# Patient Record
Sex: FEMALE | Race: WHITE | NOT HISPANIC OR LATINO | Employment: UNEMPLOYED | URBAN - METROPOLITAN AREA
[De-identification: names, ages, dates, MRNs, and addresses within clinical notes are randomized per-mention and may not be internally consistent; named-entity substitution may affect disease eponyms.]

---

## 2017-08-21 ENCOUNTER — ALLSCRIPTS OFFICE VISIT (OUTPATIENT)
Dept: OTHER | Facility: OTHER | Age: 47
End: 2017-08-21

## 2017-08-21 ENCOUNTER — TRANSCRIBE ORDERS (OUTPATIENT)
Dept: RADIOLOGY | Facility: CLINIC | Age: 47
End: 2017-08-21

## 2017-08-21 ENCOUNTER — APPOINTMENT (OUTPATIENT)
Dept: RADIOLOGY | Facility: CLINIC | Age: 47
End: 2017-08-21
Payer: COMMERCIAL

## 2017-08-21 DIAGNOSIS — M70.62 TROCHANTERIC BURSITIS OF LEFT HIP: ICD-10-CM

## 2017-08-21 DIAGNOSIS — Z12.39 ENCOUNTER FOR OTHER SCREENING FOR MALIGNANT NEOPLASM OF BREAST: ICD-10-CM

## 2017-08-21 DIAGNOSIS — M25.552 PAIN IN LEFT HIP: ICD-10-CM

## 2017-08-21 DIAGNOSIS — N90.7 VULVAR CYST: ICD-10-CM

## 2017-08-21 DIAGNOSIS — M16.12 PRIMARY OSTEOARTHRITIS OF LEFT HIP: ICD-10-CM

## 2017-08-21 PROCEDURE — 72110 X-RAY EXAM L-2 SPINE 4/>VWS: CPT

## 2017-08-21 PROCEDURE — 73502 X-RAY EXAM HIP UNI 2-3 VIEWS: CPT

## 2017-08-22 ENCOUNTER — GENERIC CONVERSION - ENCOUNTER (OUTPATIENT)
Dept: OTHER | Facility: OTHER | Age: 47
End: 2017-08-22

## 2017-08-29 ENCOUNTER — ALLSCRIPTS OFFICE VISIT (OUTPATIENT)
Dept: OTHER | Facility: OTHER | Age: 47
End: 2017-08-29

## 2017-09-05 ENCOUNTER — HOSPITAL ENCOUNTER (OUTPATIENT)
Dept: RADIOLOGY | Facility: HOSPITAL | Age: 47
Discharge: HOME | End: 2017-09-05
Attending: ORTHOPAEDIC SURGERY
Payer: COMMERCIAL

## 2017-09-05 DIAGNOSIS — M70.62: ICD-10-CM

## 2017-09-05 PROCEDURE — 77002 NEEDLE LOCALIZATION BY XRAY: CPT

## 2017-09-05 PROCEDURE — 20610 DRAIN/INJ JOINT/BURSA W/O US: CPT

## 2017-09-05 RX ORDER — LIDOCAINE HYDROCHLORIDE 10 MG/ML
10 INJECTION, SOLUTION INFILTRATION; PERINEURAL
Status: COMPLETED | OUTPATIENT
Start: 2017-09-05 | End: 2017-09-05

## 2017-09-05 RX ORDER — BUPIVACAINE HYDROCHLORIDE 2.5 MG/ML
2 INJECTION, SOLUTION EPIDURAL; INFILTRATION; INTRACAUDAL
Status: COMPLETED | OUTPATIENT
Start: 2017-09-05 | End: 2017-09-05

## 2017-09-05 RX ORDER — METHYLPREDNISOLONE ACETATE 80 MG/ML
80 INJECTION, SUSPENSION INTRA-ARTICULAR; INTRALESIONAL; INTRAMUSCULAR; SOFT TISSUE
Status: COMPLETED | OUTPATIENT
Start: 2017-09-05 | End: 2017-09-05

## 2017-09-05 RX ADMIN — LIDOCAINE HYDROCHLORIDE 10 ML: 10 INJECTION, SOLUTION INFILTRATION; PERINEURAL at 14:01

## 2017-09-05 RX ADMIN — BUPIVACAINE HYDROCHLORIDE 2 ML: 2.5 INJECTION, SOLUTION EPIDURAL; INFILTRATION; INTRACAUDAL; PERINEURAL at 14:02

## 2017-09-05 RX ADMIN — METHYLPREDNISOLONE ACETATE 80 MG: 80 INJECTION, SUSPENSION INTRA-ARTICULAR; INTRALESIONAL; INTRAMUSCULAR; SOFT TISSUE at 14:11

## 2017-09-05 RX ADMIN — IOHEXOL 3 ML: 300 INJECTION, SOLUTION INTRAVENOUS at 14:02

## 2017-09-12 ENCOUNTER — HOSPITAL ENCOUNTER (OUTPATIENT)
Dept: RADIOLOGY | Facility: CLINIC | Age: 47
Discharge: HOME | End: 2017-09-12
Payer: COMMERCIAL

## 2017-09-12 ENCOUNTER — ALLSCRIPTS OFFICE VISIT (OUTPATIENT)
Dept: OTHER | Facility: OTHER | Age: 47
End: 2017-09-12

## 2017-09-12 ENCOUNTER — GENERIC CONVERSION - ENCOUNTER (OUTPATIENT)
Dept: OTHER | Facility: OTHER | Age: 47
End: 2017-09-12

## 2017-09-12 ENCOUNTER — TRANSCRIBE ORDERS (OUTPATIENT)
Dept: RADIOLOGY | Facility: CLINIC | Age: 47
End: 2017-09-12

## 2017-09-12 DIAGNOSIS — Z12.39 ENCOUNTER FOR OTHER SCREENING FOR MALIGNANT NEOPLASM OF BREAST: ICD-10-CM

## 2017-09-12 PROCEDURE — G0202 SCR MAMMO BI INCL CAD: HCPCS

## 2017-09-15 LAB
ADEQUACY: (HISTORICAL): NORMAL
CLINICIAN PROVIDIED ICD 9 OR 10 (HISTORICAL): NORMAL
COMMENT (HISTORICAL): NORMAL
DIAGNOSIS (HISTORICAL): NORMAL
NOTE: (HISTORICAL): NORMAL
PERFORMED BY (HISTORICAL): NORMAL
REFLEX (HISTORICAL): NORMAL

## 2017-09-19 ENCOUNTER — GENERIC CONVERSION - ENCOUNTER (OUTPATIENT)
Dept: OTHER | Facility: OTHER | Age: 47
End: 2017-09-19

## 2017-09-26 DIAGNOSIS — N90.7 VULVAR CYST: ICD-10-CM

## 2017-09-28 ENCOUNTER — GENERIC CONVERSION - ENCOUNTER (OUTPATIENT)
Dept: OTHER | Facility: OTHER | Age: 47
End: 2017-09-28

## 2017-09-28 ENCOUNTER — HOSPITAL ENCOUNTER (OUTPATIENT)
Dept: RADIOLOGY | Facility: HOSPITAL | Age: 47
Discharge: HOME | End: 2017-09-28
Attending: FAMILY MEDICINE
Payer: COMMERCIAL

## 2017-09-28 DIAGNOSIS — N90.7 VULVAR CYST: ICD-10-CM

## 2017-09-28 PROCEDURE — 76705 ECHO EXAM OF ABDOMEN: CPT

## 2017-10-27 ENCOUNTER — ALLSCRIPTS OFFICE VISIT (OUTPATIENT)
Dept: OTHER | Facility: OTHER | Age: 47
End: 2017-10-27

## 2017-10-27 ENCOUNTER — TRANSCRIBE ORDERS (OUTPATIENT)
Dept: ADMINISTRATIVE | Facility: HOSPITAL | Age: 47
End: 2017-10-27

## 2017-10-27 DIAGNOSIS — S61.218A LACERATION WITHOUT FOREIGN BODY OF OTHER FINGER WITHOUT DAMAGE TO NAIL, INITIAL ENCOUNTER: ICD-10-CM

## 2017-10-27 DIAGNOSIS — M70.62 TROCHANTERIC BURSITIS OF LEFT HIP: ICD-10-CM

## 2017-10-27 DIAGNOSIS — M54.16 LUMBAR RADICULOPATHY: Primary | ICD-10-CM

## 2017-10-27 DIAGNOSIS — L03.019 CELLULITIS OF FINGER: ICD-10-CM

## 2017-10-27 DIAGNOSIS — M54.16 RADICULOPATHY OF LUMBAR REGION: ICD-10-CM

## 2017-10-27 DIAGNOSIS — M25.552 PAIN IN LEFT HIP: ICD-10-CM

## 2017-10-28 NOTE — PROGRESS NOTES
Assessment  1  Lumbar radiculopathy (724 4) (M54 16)    Plan  Greater trochanteric bursitis of left hip    · Diclofenac Sodium 1 % Transdermal Gel (Voltaren); APPLY SPARINGLY TO  AFFECTED AREA(S) THREE TO FOUR TIMES A DAY  Greater trochanteric bursitis of left hip, Left hip pain    · *1 - [facility] [facility]  *  Status: Active -  Retrospective Authorization  Requested for: [id number]  Care Summary provided  : Yes  Lumbar radiculopathy    · PredniSONE 20 MG Oral Tablet; TAKE 1 TABLET 3 TIMES DAILY WITH MEALS   · * MRI LUMBAR SPINE WO CONTRAST; Status:Hold For - Scheduling,Retrospective By  Protocol Authorization; Requested PBO:[id number];     Ivonne Steinberg is here for follow-up regarding her left lower extremity pain complaints  On her previous visit with me and felt that most of her pain complaints were due to her greater trochanteric bursitis and possibly a small component from her osteoarthritis  On review of her hip x-rays again her amount of arthritis in her left hip is underwhelming  Her pain today is localized over the lateral hip however her historical pain complaints refer more to of lumbar type of radiculopathy down her left lower extremity and her neuro exam was positive for this today  She does have a history of back problems in the past and her x-rays from August were again reviewed which did not reveal any signs of gross sign of stenosis or advanced degenerative change however I am concerned for stenosis in the foramina based off of her history and her clinical exam today  She does not have any pathologic reflexes or focal changes in her neuro exam  I will get an MRI of her lumbar spine without contrast to evaluate for foraminal or central stenosis  I have also given her a prescription for short course of prednisone to help relieve her radicular type complaints down her left lower extremity   For her left hip I have also given her a topical anti-inflammatories to be applied over the greater trochanteric bursa region  I have discouraged her to return to physical therapy for her hip because after which pending the results of the MRI we can add add more intensive physical therapy for her lumbar spine  I would like to see her back in approximately 2 weeks or after her MRI is completed for three view here with me so that we can further delineate her plan of care  The patient may call the office at anytime if any questions or concerns should arise  Chief Complaint  1  Hip Pain  Left hip pain follow-up      History of Present Illness  HPI: Silvina Hurley is here for continued follow-up regarding her complex left lower extremity pain complaints  The last time I saw her I felt that her symptoms were mostly due to her bursitis in her osteoarthritis  She was given my locking am it ordered undergo a steroid injection of her left hip and start physical therapy  She states the steroid injection only provided a few hours of relief and physical therapy was not sterilely pursued due to a death in the family  Her pain complaints change slightly today in the sense that she reports a left lower extremity diffuse burning type of sharp pain that goes from the back of the hip down to the foot  She states that her toes can even get numb on occasion  She states the pain is a burning type nature  She also states there is pain in the groin lateral hip posterior hip as well  The pain is still related with activity but she states she also has pain down her left lower extremity when she lays down in bed at night trying to sleep  Review of Systems    Constitutional: No fever, no chills, feels well, no tiredness, no recent weight gain or loss  Eyes: No complaints of eyesight problems, no red eyes  ENT: no loss of hearing, no nosebleeds, no sore throat  Cardiovascular: No complaints of chest pain, no palpitations, no leg claudication or lower extremity edema     Respiratory: no compliants of shortness of breath, no wheezing, no cough  Gastrointestinal: no complaints of abdominal pain, no constipation, no nausea or diarrhea, no vomiting, no bloody stools  Genitourinary: no complaints of dysuria, no incontinence  Musculoskeletal: arthralgias,-- limb pain-- and-- joint stiffness, but-- no joint swelling,-- no myalgias-- and-- no limb swelling  Integumentary: no complaints of skin rash or lesion, no itching or dry skin, no skin wounds  Neurological: no complaints of headache, no confusion, no numbness or tingling, no dizziness  Endocrine: excessive thirst, but-- no muscle weakness-- and-- no frequent urination  no feelings of weakness   Psychiatric: No suicidal thoughts, no anxiety, no feelings of depression  ROS reviewed  Active Problems  1  Adult BMI 28 0-28 9 kg/sq m (V85 24) (Z68 28)   2  Encounter for other screening for malignant neoplasm of breast (V76 19) (Z12 39)   3  Encounter for screening examination for impaired glucose regulation and diabetes   mellitus (V77 1) (Z13 1)   4  Greater trochanteric bursitis of left hip (726 5) (M70 62)   5  Left hip pain (719 45) (M25 552)   6  Low back pain (724 2) (M54 5)   7  Need for influenza vaccination (V04 81) (Z23)   8  Primary osteoarthritis of left hip (715 15) (M16 12)   9  Screening cholesterol level (V77 91) (Z13 220)   10  Vulvar cyst (624 8) (N90 7)   11  Well woman exam with routine gynecological exam (V72 31) (Z01 419)    Past Medical History   · History of Hip pain (719 45) (M25 559)    The active problems and past medical history were reviewed and updated today  Surgical History   · History of Ankle Arthroplasty   · History of Breast Surgery Enlargement Procedure Bilateral   · History of Breast Surgery Lumpectomy   · History of Laparoscopic Excision Of Ectopic Pregnancy And Salpingectomy    The surgical history was reviewed and updated today         Family History  Mother    · Family history of arthritis (V17 7) (Z82 61)   · Family history of emphysema (V17 6) (Z82 5)  Maternal Aunt    · Family history of malignant neoplasm of breast (V16 3) (Z80 3)    The family history was reviewed and updated today  Social History   · Daily caffeinated coffee consumption   · Former smoker (Z33 93) (D87 155)   · Social drinker (V49 89) (Z78 9)  The social history was reviewed and updated today  Current Meds   1  No Reported Medications Recorded    The medication list was reviewed and updated today  Allergies  1  Vicodin TABS    Vitals   Recorded: [id number] 08:17AM   Height 5 ft 2 in   Weight 163 lb    BMI Calculated 29 81   BSA Calculated 1 75     Physical Exam      Gen  : Awake alert and oriented Ã3, no acute distress, BMI of 29 81  Left hip: Skin diffusely intact, range of motion is to 90Â° of flexion, 45Â° of abduction, 20Â° of adduction, 40Â° of external rotation, 10Â° of internal rotation, positive Stinchfield, tenderness to palpation greater trochanter, positive neuro straight leg raise left side     Constitutional - General appearance: Normal    Musculoskeletal - Gait and station: Abnormal  Gait evaluation demonstrated antalgia on the left  -- Muscle strength/tone: Normal -- Lower extremity compartments: Normal    Cardiovascular - Pulses: Normal -- Examination of extremities for edema and/or varicosities: Normal    Skin - Skin and subcutaneous tissue: Normal    Neurologic - Sensation: Normal -- Lower extremity peripheral neuro exam: Normal    Psychiatric - Orientation to person, place, and time: Normal -- Mood and affect: Normal    Eyes   Conjunctiva and lids: Normal     Pupils and irises: Normal        Future Appointments    Date/Time Provider Specialty Site   11/07/2017 08:30 AM Lee Downs DO Orthopedic Surgery [facility] ORTHO [address] [facility] [address]   12/29/2017 08:15 AM Lee Downs DO Orthopedic Surgery [facility]     Signatures   Electronically signed by :  Tona Fuller DO; Oct 27 2017  9:16AM EST (Author)

## 2017-11-08 ENCOUNTER — GENERIC CONVERSION - ENCOUNTER (OUTPATIENT)
Dept: OTHER | Facility: OTHER | Age: 47
End: 2017-11-08

## 2017-11-08 ENCOUNTER — TRANSCRIBE ORDERS (OUTPATIENT)
Dept: RADIOLOGY | Facility: CLINIC | Age: 47
End: 2017-11-08

## 2017-11-08 ENCOUNTER — APPOINTMENT (OUTPATIENT)
Dept: RADIOLOGY | Facility: CLINIC | Age: 47
End: 2017-11-08
Payer: COMMERCIAL

## 2017-11-08 DIAGNOSIS — L03.019 CELLULITIS OF FINGER: ICD-10-CM

## 2017-11-08 DIAGNOSIS — S61.218A LACERATION WITHOUT FOREIGN BODY OF OTHER FINGER WITHOUT DAMAGE TO NAIL, INITIAL ENCOUNTER: ICD-10-CM

## 2017-11-08 PROCEDURE — 73130 X-RAY EXAM OF HAND: CPT

## 2017-11-10 ENCOUNTER — GENERIC CONVERSION - ENCOUNTER (OUTPATIENT)
Dept: OTHER | Facility: OTHER | Age: 47
End: 2017-11-10

## 2017-11-14 ENCOUNTER — APPOINTMENT (OUTPATIENT)
Dept: PHYSICAL THERAPY | Facility: CLINIC | Age: 47
End: 2017-11-14
Payer: COMMERCIAL

## 2017-11-14 DIAGNOSIS — M54.16 RADICULOPATHY OF LUMBAR REGION: ICD-10-CM

## 2017-11-14 PROCEDURE — G8979 MOBILITY GOAL STATUS: HCPCS

## 2017-11-14 PROCEDURE — G8978 MOBILITY CURRENT STATUS: HCPCS

## 2017-11-14 PROCEDURE — 97161 PT EVAL LOW COMPLEX 20 MIN: CPT

## 2017-11-16 ENCOUNTER — GENERIC CONVERSION - ENCOUNTER (OUTPATIENT)
Dept: OTHER | Facility: OTHER | Age: 47
End: 2017-11-16

## 2017-11-16 ENCOUNTER — APPOINTMENT (OUTPATIENT)
Dept: PHYSICAL THERAPY | Facility: CLINIC | Age: 47
End: 2017-11-16
Payer: COMMERCIAL

## 2017-11-16 PROCEDURE — 97112 NEUROMUSCULAR REEDUCATION: CPT

## 2017-11-16 PROCEDURE — 97110 THERAPEUTIC EXERCISES: CPT

## 2017-11-21 ENCOUNTER — APPOINTMENT (OUTPATIENT)
Dept: PHYSICAL THERAPY | Facility: CLINIC | Age: 47
End: 2017-11-21
Payer: COMMERCIAL

## 2017-11-24 ENCOUNTER — APPOINTMENT (OUTPATIENT)
Dept: PHYSICAL THERAPY | Facility: CLINIC | Age: 47
End: 2017-11-24
Payer: COMMERCIAL

## 2017-11-28 ENCOUNTER — APPOINTMENT (OUTPATIENT)
Dept: PHYSICAL THERAPY | Facility: CLINIC | Age: 47
End: 2017-11-28
Payer: COMMERCIAL

## 2017-12-29 ENCOUNTER — TRANSCRIBE ORDERS (OUTPATIENT)
Dept: ADMINISTRATIVE | Facility: HOSPITAL | Age: 47
End: 2017-12-29

## 2017-12-29 ENCOUNTER — ALLSCRIPTS OFFICE VISIT (OUTPATIENT)
Dept: OTHER | Facility: OTHER | Age: 47
End: 2017-12-29

## 2017-12-29 DIAGNOSIS — M25.552 PAIN IN LEFT HIP: ICD-10-CM

## 2017-12-29 DIAGNOSIS — M25.552 LEFT HIP PAIN: Primary | ICD-10-CM

## 2017-12-29 DIAGNOSIS — M54.16 RADICULOPATHY OF LUMBAR REGION: ICD-10-CM

## 2017-12-30 NOTE — PROGRESS NOTES
Assessment   1  Left hip pain (429 45) (M25 552)   2  Lumbar radiculopathy (724 4) (M54 16)    Plan   Left hip pain    · * MRI HIP LEFT WO CONTRAST; Status:Active - Retrospective By Protocol Authorization; Requested for:[id number];     Leif Mccracken is here for continued follow-up regarding her left lower extremity issues  She is here quite frustrated uncomfortable regarding her left hip complaints  She has attempted anti-inflammatories, Medrol Dosepak in steroid treatment, physical therapy, all of which have failed  She has attempted multiple sessions of physical therapy and this actually made her left hip and leg complaints much worse and she was unable to attend the remaining sessions  She is here today frustrated that her pain is unrelenting and in etiology for her pain is not established yet  Her examination today localizes her discomfort to the hip as hip examination is provocative and causes her pain however she does have confounding findings of radicular type symptoms as she does have a history of lumbar spine issues  At this point the 2 possible etiologies for her discomfort meaning the left hip and lumbar spine need further advanced imaging to evaluate for underlying pathology that may be causing her pain  Her x-rays of the left hip are underwhelming and joint spaces are well maintained therefore I do not feel that this is an arthritic process and an MRI of the left hip without contrast is indicated  She has activity-related pain localized around the hip, failed conservative measures of treatment, attempted physical therapy and has failed secondary to pain and further advanced imaging is warranted  She does have underlying lumbar radicular complaints however they do not seem to be the more painful of the 2 presently  We will get the MRI of her left hip without contrast and I will see her back in the office for its review here with me to further delineate her plan of care   The patient may call the office at anytime if any questions or concerns should arise  The patient has the current Goals: Decreased left hip and leg pain  Patient is able to Self-Care  The treatment plan was reviewed with the patient/guardian  The patient/guardian understands and agrees with the treatment plan      Chief Complaint   1  Hip Pain  Left hip follow-up      History of Present Illness   HPI: Renetta Couch is here for continued follow-up regarding her left hip complaints  She is here visibly upset and very uncomfortable regarding her left hip  She states that since her last visit with me she has gone to 2 sessions of physical therapy and after which have significantly increased her left hip pain  She has also tried anti-inflammatories as well as a steroid prescription that I have given her both of which failed to give her long-lasting relief of her pain  Today she complains that her left hip has a band like, vice  type of pain that goes from the front to the lateral to the posterior portion of her hip  She states there is pain that does radiate down the lateral and anterior portions of her thigh  It starts in the groin, the pain radiates down the anterior thigh and lateral thigh and is made worse with movement, walking, activity, and even attempting to lay down and get sleep at night  She is extremely uncomfortable and very frustrated that the source of her pain has not been discovered  Review of Systems        Constitutional: feeling poorly,-- recent weight gain-- and-- feeling tired, but-- no fever,-- no chills-- and-- no recent weight loss  Eyes: No complaints of eyesight problems, no red eyes  ENT: no loss of hearing, no nosebleeds, no sore throat  Cardiovascular: No complaints of chest pain, no palpitations, no leg claudication or lower extremity edema  Respiratory: no compliants of shortness of breath, no wheezing, no cough        Gastrointestinal: no complaints of abdominal pain, no constipation, no nausea or diarrhea, no vomiting, no bloody stools  Genitourinary: no complaints of dysuria, no incontinence  Musculoskeletal: arthralgias,-- joint swelling,-- limb pain,-- myalgias,-- joint stiffness-- and-- limb swelling  Integumentary: no complaints of skin rash or lesion, no itching or dry skin, no skin wounds  Neurological: numbness-- and-- tingling, but-- no headache,-- no confusion-- and-- no dizziness  Endocrine: muscle weakness, but-- no frequent urination-- and-- no excessive thirst  no feelings of weakness      Psychiatric: No suicidal thoughts, no anxiety, no feelings of depression  ROS reviewed  Active Problems   1  Adult BMI 28 0-28 9 kg/sq m (V85 24) (Z68 28)   2  Cellulitis of little finger (681 00) (L03 019)   3  Encounter for other screening for malignant neoplasm of breast (V76 19) (Z12 39)   4  Encounter for screening examination for impaired glucose regulation and diabetes     mellitus (V77 1) (Z13 1)   5  Greater trochanteric bursitis of left hip (726 5) (M70 62)   6  Laceration of finger, little (883 0) (S61 218A)   7  Left hip pain (719 45) (M25 552)   8  Low back pain (724 2) (M54 5)   9  Lumbar radiculopathy (724 4) (M54 16)   10  Need for influenza vaccination (V04 81) (Z23)   11  Primary osteoarthritis of left hip (715 15) (M16 12)   12  Screening cholesterol level (V77 91) (Z13 220)   13  Vulvar cyst (624 8) (N90 7)   14  Well woman exam with routine gynecological exam (V72 31) (Z01 419)    Past Medical History    · History of Hip pain (719 45) (M25 559)     The active problems and past medical history were reviewed and updated today  Surgical History    · History of Ankle Arthroplasty   · History of Breast Surgery Enlargement Procedure Bilateral   · History of Breast Surgery Lumpectomy   · History of Laparoscopic Excision Of Ectopic Pregnancy And Salpingectomy     The surgical history was reviewed and updated today         Family History   Mother    · Family history of arthritis (V17 7) (Z82 61)   · Family history of emphysema (V17 6) (Z82 5)  Maternal Aunt    · Family history of malignant neoplasm of breast (V16 3) (Z80 3)     The family history was reviewed and updated today  Social History    · Daily caffeinated coffee consumption   · Former smoker (Q93 89) (A57 433)   · Social drinker (V49 89) (Z78 9)  The social history was reviewed and updated today  Current Meds    1  Tylenol 325 MG Oral Tablet Recorded     The medication list was reviewed and updated today  Allergies   1  Vicodin TABS    Vitals    Recorded: [id number] 08:33AM   Heart Rate 738   Systolic 640   Diastolic 88   Height 5 ft 2 in   Weight 164 lb 4 00 oz   BMI Calculated 30 04   BSA Calculated 1 76     Physical Exam     Gen  : Awake alert and oriented Ã3, no acute distress, BMI of 30 04  Left hip: Skin diffusely intact, no dysesthesia with light touch, no skin mottling or change in color, no rash, range of motion is to 90Â° of flexion, 45Â° of abduction, 20Â° of adduction, 40Â° of external rotation, 10Â° of internal rotation, positive Stinchfield, minimal tenderness to palpation greater trochanter, positive neuro straight leg raise left side, all motion of the left hip demonstrates discomfort circumferentially around the hip without radiating pain down past the knee  Positive SI joint pain with compression on the left side, + STEVEN/FADIR         Constitutional - General appearance: Normal       Musculoskeletal - Gait and station: Abnormal  Gait evaluation demonstrated antalgia on the left-- and-- Slow antalgic gait  -- Muscle strength/tone: Normal -- Lower extremity compartments: Normal       Cardiovascular - Pulses: Normal -- Examination of extremities for edema and/or varicosities: Normal       Skin - Skin and subcutaneous tissue: Normal       Neurologic - Sensation: Normal       Psychiatric - Orientation to person, place, and time: Normal -- Mood and affect: Normal       Eyes      Conjunctiva and lids: Normal        Pupils and irises: Normal        Signatures    Electronically signed by :  Yvette Levin DO; Dec 29 2017  9:27AM EST                       (Author)

## 2018-01-02 ENCOUNTER — GENERIC CONVERSION - ENCOUNTER (OUTPATIENT)
Dept: OTHER | Facility: OTHER | Age: 48
End: 2018-01-02

## 2018-01-02 ENCOUNTER — HOSPITAL ENCOUNTER (OUTPATIENT)
Dept: RADIOLOGY | Facility: HOSPITAL | Age: 48
Discharge: HOME | End: 2018-01-02
Attending: ORTHOPAEDIC SURGERY
Payer: COMMERCIAL

## 2018-01-02 DIAGNOSIS — M25.552 LEFT HIP PAIN: ICD-10-CM

## 2018-01-02 PROCEDURE — 73721 MRI JNT OF LWR EXTRE W/O DYE: CPT

## 2018-01-03 ENCOUNTER — TRANSCRIBE ORDERS (OUTPATIENT)
Dept: ADMINISTRATIVE | Facility: HOSPITAL | Age: 48
End: 2018-01-03

## 2018-01-03 ENCOUNTER — ALLSCRIPTS OFFICE VISIT (OUTPATIENT)
Dept: OTHER | Facility: OTHER | Age: 48
End: 2018-01-03

## 2018-01-03 DIAGNOSIS — M16.12 PRIMARY OSTEOARTHRITIS OF LEFT HIP: ICD-10-CM

## 2018-01-03 DIAGNOSIS — M54.16 LUMBAR RADICULOPATHY: Primary | ICD-10-CM

## 2018-01-08 ENCOUNTER — HOSPITAL ENCOUNTER (OUTPATIENT)
Dept: RADIOLOGY | Facility: HOSPITAL | Age: 48
Discharge: HOME | End: 2018-01-08
Attending: ORTHOPAEDIC SURGERY
Payer: COMMERCIAL

## 2018-01-08 DIAGNOSIS — M54.16 RADICULOPATHY OF LUMBAR REGION: ICD-10-CM

## 2018-01-08 PROCEDURE — 72148 MRI LUMBAR SPINE W/O DYE: CPT

## 2018-01-09 ENCOUNTER — GENERIC CONVERSION - ENCOUNTER (OUTPATIENT)
Dept: OTHER | Facility: OTHER | Age: 48
End: 2018-01-09

## 2018-01-10 NOTE — RESULT NOTES
Verified Results  * XR HAND 3+ VIEW LEFT [id number] 09:29AM Enrigue Tracy Order Number: [id number]     Test Name Result Flag Reference   XR HAND 3+ VW LEFT (Report)     LEFT HAND     INDICATION: Cellulitis of the 5th digit  COMPARISON: None     VIEWS: 3     IMAGES: 3     For the purposes of institution wide universal language the following terms will apply: (thumb=1st digit/finger, index finger=2nd digit/finger, long finger=3rd digit/finger, ring=4th digit/finger and small finger=5th digit/finger)     FINDINGS:     There is no acute fracture or dislocation  No degenerative changes  No lytic or blastic lesions are seen  Soft tissue swelling of the proximal 5th digit noted with no retained radiopaque foreign body or subcutaneous emphysema  IMPRESSION:     No acute osseous abnormality  Soft tissue swelling proximal 5th digit without evidence for osteomyelitis or retained radiopaque foreign body         Workstation performed: [id number]     Signed by:   Yessica Quintero MD   11/9/17

## 2018-01-12 VITALS
RESPIRATION RATE: 16 BRPM | SYSTOLIC BLOOD PRESSURE: 120 MMHG | DIASTOLIC BLOOD PRESSURE: 80 MMHG | HEART RATE: 72 BPM | BODY MASS INDEX: 29.37 KG/M2 | HEIGHT: 62 IN | WEIGHT: 159.6 LBS | TEMPERATURE: 97.2 F

## 2018-01-13 VITALS — HEIGHT: 62 IN | WEIGHT: 163 LBS | BODY MASS INDEX: 30 KG/M2

## 2018-01-14 VITALS
HEIGHT: 62 IN | SYSTOLIC BLOOD PRESSURE: 124 MMHG | HEART RATE: 70 BPM | BODY MASS INDEX: 29.81 KG/M2 | DIASTOLIC BLOOD PRESSURE: 82 MMHG | WEIGHT: 162 LBS

## 2018-01-14 NOTE — PROGRESS NOTES
Assessment    1  Well woman exam with routine gynecological exam (V72 31) (Z01 419)   2  Adult BMI 28 0-28 9 kg/sq m (V85 24) (Z68 28)    Plan  Encounter for screening examination for impaired glucose regulation and diabetes  mellitus, Screening cholesterol level    · (1) COMPREHENSIVE METABOLIC PANEL; Status:Active; Requested for:12Sep2017;    · (1) LIPID PANEL FASTING W DIRECT LDL REFLEX; Status:Active; Requested  for:12Sep2017;   Vulvar cyst    · US SUPERFICIAL LUMP (NON EXTREMITY); Status:Hold For - Scheduling; Requested  for:12Sep2017; Well woman exam with routine gynecological exam    · (1) THIN PREP PAP WITH IMAGING; Status:Active; Requested for:12Sep2017; Maturation index required? : No  : 15 years ago  HPV? : if ASCUS   · Follow-up visit in 1 year Evaluation and Treatment  Follow-up  Status: Hold For -  Scheduling  Requested for: 12Sep2017   · Begin a limited exercise program ; Status:Complete;   Done: 25FLP5790   · Decreasing the stress in your life may help your condition improve ; Status:Complete;    Done: 90OQF1960   · Regular aerobic exercise can help reduce stress ; Status:Complete;   Done: 84RPB8252   · Use a sun block product with an SPF of 15 or more ; Status:Complete;   Done:  65PJQ7699   · Vitamins can help you get daily requirements that your diet may not be giving you ;  Status:Complete;   Done: 47LXO9926   · Call [phone] if: You find a new or different kind of lump in your breast ;  Status:Complete;   Done: 38ASD6020   · Call [phone] if: You have any warning signs of skin cancer ; Status:Complete;    Done: 57OQD9583    Discussion/Summary  health maintenance visit Currently, she eats an adequate diet and has an inadequate exercise regimen  Pap test with reflex HPV testing was done today Breast cancer screening: mammogram has been ordered  Colorectal cancer screening: colorectal cancer screening is not indicated  Screening lab work includes glucose and lipid profile   The risks and benefits of immunizations were discussed and immunizations will be given as outlined in the orders  Advice and education were given regarding nutrition and aerobic exercise  Doing well  pap completed  mammogram is scheduled  will give u/s to eval sq mass on pubic symphysis area    bw script given  flu shot given  The patient was counseled regarding instructions for management, risk factor reductions, impressions, importance of compliance with treatment  Possible side effects of new medications were reviewed with the patient/guardian today  The treatment plan was reviewed with the patient/guardian  The patient/guardian understands and agrees with the treatment plan      Chief Complaint  patient presenting for her annual physical and PAP   Patient has order for mammo and believes she has an laurie today sl/cma      History of Present Illness  HM, Adult Female: The patient is being seen for a health maintenance and gynecology evaluation  Social History: Household members include boyfriend  General Health: The patient's health since the last visit is described as good  Immunizations status:  tdap utd -- ok for flu  Lifestyle:  She consumes a diverse and healthy diet  She has weight concerns  She does not use tobacco  She consumes alcohol  She reports occasional alcohol use  She denies drug use  hasn't been exercising recently due to hip but going to PT  Reproductive health:  pregnancy history: G 2P 1(ectopic pregnancies: 1 )   lmp= 39-41 years old-- postmenopausal    Screening: Additional History:  had steroid injection in hip    has lump on vagina she wants looked at   no pain  no discharge  pt has had it for " while"    has mammogram scheduled  Review of Systems    Constitutional: not feeling poorly and not feeling tired  Eyes: no eyesight problems  ENT: no hearing loss     Cardiovascular: No complaints of slow heart rate, no fast heart rate, no chest pain, no palpitations, no leg claudication, no lower extremity edema  Respiratory: No complaints of shortness of breath, no wheezing, no cough, no SOB on exertion, no orthopnea, no PND  Gastrointestinal: no change in bowel or bladder habits  Genitourinary: as noted in HPI  Musculoskeletal: arthralgias  Integumentary: as noted in HPI  Hematologic/Lymphatic: no swollen glands  Active Problems    1  Encounter for other screening for malignant neoplasm of breast (V76 19) (Z12 39)   2  Greater trochanteric bursitis of left hip (726 5) (M70 62)   3  Left hip pain (719 45) (M25 552)   4  Low back pain (724 2) (M54 5)   5  Primary osteoarthritis of left hip (715 15) (M16 12)    Past Medical History    · History of Hip pain (719 45) (M25 559)    Surgical History    · History of Ankle Arthroplasty   · History of Breast Surgery Enlargement Procedure Bilateral   · History of Breast Surgery Lumpectomy   · History of Laparoscopic Excision Of Ectopic Pregnancy And Salpingectomy    Family History  Mother    · Family history of arthritis (V17 7) (Z82 61)   · Family history of emphysema (V17 6) (Z82 5)  Maternal Aunt    · Family history of malignant neoplasm of breast (V16 3) (Z80 3)    Social History    · Daily caffeinated coffee consumption   · Former smoker (D50 76) (H24 442)   · Social drinker (V49 89) (Z78 9)    Current Meds   1  Meloxicam 15 MG Oral Tablet; take 1 tablet daily prn; Therapy: 33Scr7576 to (Evaluate:20Oct2017)  Requested for: 84Qhz3373; Last   Rx:75Iws6066 Ordered    Allergies    1  Vicodin TABS    Vitals   Recorded: 12Sep2017 08:55AM   Temperature 97 4 F   Heart Rate 72   Respiration 16   Systolic 536   Diastolic 80   Height 5 ft 2 in   Weight 158 lb 6 4 oz   BMI Calculated 28 97   BSA Calculated 1 73     Physical Exam    Constitutional   General appearance: No acute distress, well appearing and well nourished  Head and Face   Head and face: Normal     Eyes   Conjunctiva and lids: No swelling, erythema or discharge  Ears, Nose, Mouth, and Throat   External inspection of ears and nose: Normal     Neck   Neck: Supple, symmetric, trachea midline, no masses  Thyroid: Normal, no thyromegaly  Pulmonary   Respiratory effort: No increased work of breathing or signs of respiratory distress  Auscultation of lungs: Clear to auscultation  Cardiovascular   Auscultation of heart: Normal rate and rhythm, normal S1 and S2, no murmurs  Examination of extremities for edema and/or varicosities: Normal     Chest   Breasts: Normal, no dimpling or skin changes appreciated  + implants  Chest: Normal     Abdomen   Abdomen: Non-tender, no masses  Genitourinary + mobile sq mass on pubic symphysis area  Urethra: Normal, no discharge  Cervix: Normal, no lesions, Pap obtained  Uterus: Normal size, no tenderness, no masses  Adnexa/Parametria: Normal, no masses or tenderness  Lymphatic   Palpation of lymph nodes in neck: No lymphadenopathy  Palpation of lymph nodes in groin: No lymphadenopathy  Musculoskeletal   Gait and station: Normal     Neurologic   Cranial nerves: Cranial nerves II-XII intact      Psychiatric   Judgment and insight: Normal     Orientation to person, place, and time: Normal     Mood and affect: Normal        Future Appointments    Date/Time Provider Specialty Site   12/29/2017 08:15 AM Heather Mancera DO Orthopedic Surgery [facility]     Signatures   Electronically signed by : Ciera Serrano DO; Sep 12 2017  9:29AM EST                       (Author)

## 2018-01-17 NOTE — RESULT NOTES
Verified Results  * XR HIP/PELV 2-3 VWS LEFT W PELVIS IF PERFORMED 21Aug2017 02:44PM Morna Pucker Order Number: [id number]     Test Name Result Flag Reference   * XR HIP/PELV 2-3 VWS LEFT (Report)     LEFT HIP     INDICATION: Low back pain into left hip  COMPARISON: None     VIEWS: AP pelvis and 2 coned down views     IMAGES: 3     FINDINGS:     There is no fracture or dislocation  Moderate narrowing of the left hip joint  There is a scoliosis of the lumbar spine convexity to the left  No lytic or blastic lesions are seen  Soft tissues are unremarkable  IMPRESSION:     Moderate narrowing of the left hip joint  Scoliosis  Workstation performed: [id number]     Signed by:   Nazia Samuel MD   8/22/17     * XR SPINE LUMBAR MINIMUM 4 VIEWS NON INJURY 21Aug2017 02:44PM Morna Pucker Order Number: [id number]     Test Name Result Flag Reference   XR SPINE LUMBAR MINIMUM 4 VIEWS (Report)     LUMBAR SPINE     INDICATION: Low back pain radiates to left hip  COMPARISON: None     VIEWS: AP, lateral, bilateral oblique and coned down projections     IMAGES: 5     FINDINGS:     A mild scoliosis is present convexity to the left  There is no radiographic evidence of acute fracture or destructive osseous lesion  Minimal spurring at several levels  Visualized soft tissues appear unremarkable  IMPRESSION:     Mild scoliosis         Workstation performed: [id number]     Signed by:   Nazia Samuel MD   8/22/17

## 2018-01-17 NOTE — RESULT NOTES
Discussion/Summary   Your mammogram is normal   Please repeat mammogram in 1 year  Dr Marna Meckel CAD [id number] 09:43AM Eric Sadler Order Number: [id number]    - Patient Instructions: To schedule this appointment, please contact Central Scheduling at [phone]  Do not wear any perfume, powder, lotion or deodorant on breast or underarm area  Please bring your doctors order, referral (if needed) and insurance information with you on the day of the test  Failure to bring this information may result in this test being rescheduled  Arrive 15 minutes prior to your appointment time to register  On the day of your test, please bring any prior mammogram or breast studies with you that were not performed at a [facility]  Failure to bring prior exams may result in your test needing to be rescheduled  Test Name Result Flag Reference   MAMMO SCREENING BILATERAL W CAD (Report)     Patient History:   Patient is postmenopausal    Family history of breast cancer in mother  Retro-pectoral saline implants in both breasts, April 17, 2009  No Hormone Replacement Therapy   Patient is a former smoker, and smoked for 8 years  Patient's    BMI is 28 9  Reason for exam: screening, asymptomatic  Mammo Screening Bilateral W CAD: September 12, 2017 - Check In #:   [de-identified]   Bilateral MLO, CC, XCCL, CCID, and MLOID view(s) were taken  Technologist: CHUCK Dickerson   No prior studies available for comparison  There are scattered fibroglandular densities  No new dominant    soft tissue mass, architectural distortion or suspicious    calcifications are noted  The skin and nipple structures are    within normal limits  Benign appearing calcifications are noted  Bilateral retropectoral implants  No mammographic evidence of malignancy  No    significant changes when compared with prior studies       ACR BI-RADSï¾® Assessments: BiRad:2 - Benign     Recommendation:   Routine screening mammogram of both breasts in 1 year  Analyzed by CAD     The patient is scheduled in a reminder system for screening    mammography  8-10% of cancers will be missed on mammography  Management of a    palpable abnormality must be based on clinical grounds  Patients   will be notified of their results via letter from our facility  Accredited by [employer] of [employer] and FDA       Transcription Location: [place] [address]: [id number]     Risk Value(s):   Tyrer-Cuzick 10 Year: 3 400%, Tyrer-Cuzick Lifetime: 16 900%,    Myriad Table: 1 5%, MARK 5 Year: 1 4%, NCI Lifetime: 15 7%   Signed by:   Haleigh Rogel MD   9/12/17

## 2018-01-18 NOTE — RESULT NOTES
Discussion/Summary   please call patient  mass consistent with lipoma--- benign  rl      Verified Results  US SUPERFICIAL LUMP (NON EXTREMITY) [id number] 09:36AM Rita Arnold Order Number: [id number]    - Patient Instructions: To schedule this appointment, please contact Central Scheduling at [phone]  Test Name Result Flag Reference   US TRUNK SOFT TISSUE (Report)     ULTRASOUND left labial mass  TECHNIQUE:  Using a high frequency transducer, the labial region was scanned   INDICATION: Mass in the left labial region  COMPARISON: None     FINDINGS:     Soft tissue mass is seen in the left labial region measuring 3 1 x 1 2 x 2 0 cm  This is slightly heterogeneous  The sonographer reports that this is compressible and mobile  This likely represents a lipoma  This is asymmetric compared to the right    side  IMPRESSION:     Soft tissue mass in the left labial region  This likely represents a lipoma         Workstation performed: [id number]     Signed by:   Tawny Hancock MD   9/28/17

## 2018-01-22 VITALS
HEIGHT: 62 IN | SYSTOLIC BLOOD PRESSURE: 130 MMHG | BODY MASS INDEX: 30.18 KG/M2 | DIASTOLIC BLOOD PRESSURE: 78 MMHG | RESPIRATION RATE: 16 BRPM | TEMPERATURE: 97.2 F | HEART RATE: 72 BPM | WEIGHT: 164 LBS

## 2018-01-22 VITALS
DIASTOLIC BLOOD PRESSURE: 80 MMHG | TEMPERATURE: 97.4 F | HEART RATE: 72 BPM | HEIGHT: 62 IN | SYSTOLIC BLOOD PRESSURE: 130 MMHG | RESPIRATION RATE: 16 BRPM | WEIGHT: 158.4 LBS | BODY MASS INDEX: 29.15 KG/M2

## 2018-01-23 VITALS
WEIGHT: 164.25 LBS | HEIGHT: 62 IN | HEART RATE: 89 BPM | BODY MASS INDEX: 30.22 KG/M2 | DIASTOLIC BLOOD PRESSURE: 80 MMHG | SYSTOLIC BLOOD PRESSURE: 133 MMHG

## 2018-01-23 VITALS
WEIGHT: 164.25 LBS | BODY MASS INDEX: 30.22 KG/M2 | HEIGHT: 62 IN | SYSTOLIC BLOOD PRESSURE: 137 MMHG | DIASTOLIC BLOOD PRESSURE: 88 MMHG | HEART RATE: 101 BPM

## 2018-01-24 VITALS
WEIGHT: 164.25 LBS | SYSTOLIC BLOOD PRESSURE: 119 MMHG | DIASTOLIC BLOOD PRESSURE: 86 MMHG | HEIGHT: 62 IN | HEART RATE: 97 BPM | BODY MASS INDEX: 30.22 KG/M2

## 2018-02-02 ENCOUNTER — TELEPHONE (OUTPATIENT)
Dept: OBGYN CLINIC | Facility: HOSPITAL | Age: 48
End: 2018-02-02

## 2018-02-02 ENCOUNTER — TELEPHONE (OUTPATIENT)
Dept: PAIN MEDICINE | Facility: CLINIC | Age: 48
End: 2018-02-02

## 2018-02-02 ENCOUNTER — TELEPHONE (OUTPATIENT)
Dept: OBGYN CLINIC | Facility: CLINIC | Age: 48
End: 2018-02-02

## 2018-02-02 DIAGNOSIS — M25.559 ARTHRALGIA OF HIP, UNSPECIFIED LATERALITY: Primary | ICD-10-CM

## 2018-02-02 NOTE — TELEPHONE ENCOUNTER
Patient is calling  Patient sees Dr Maryjane Carter    Patient is calling asking to speak directly with Dr Maryjane Carter  She is stating it's about the same problem but getting worse  Patient is stating that she has done some research & also spoke to her brother who is a doctor & they thing that it is "trochanteric bursitis"  Patient is stating that she is absolutely miserable & pain is horrific

## 2018-02-02 NOTE — TELEPHONE ENCOUNTER
Patient would like to see pain management I will put order in and send this off the Bre[facility] to schedule her an appointment

## 2018-02-02 NOTE — TELEPHONE ENCOUNTER
Spoke with patient let her know that her option at this point are a bursa injection or pain management she wasn't too happy with either of those choices as she is scared of "her skin cells dying from the injection" She is asking if an anti inflammatory medication can be sent over to her pharmacy  She sated she was on these before and they upset her stomach and I told her all of these medications do he same thing  The  got on the phone and was questioning why we would not give her an anti inflammatory and I stated because it upset her stomach the first time and he stated well she has no other options, she doesn't want the injection so she will deal with the upset stomach and if the medication doesn't work then she will have to go with the bursa injection

## 2018-02-02 NOTE — TELEPHONE ENCOUNTER
It is evident she cannot tolerate NSAID based off of history and I will not prescribe her other anti-inflammatories due to the risk of gastric ulceration and bleeding  I do not have any other options for treatment at this point other than a hip bursa injection  If she does not want to pursue this course of treatment, then there is no need for an appointment with me    She should see pain management in that case

## 2018-02-02 NOTE — TELEPHONE ENCOUNTER
Patient was referred by Dr Jonas Welch, I called patient to start Intake  I spoke with  at first very rude and used inappropriate language  I ask to speak to wife he gave her the phone and was able to complete intake at [facility] office but once I gave Trey Dominguez her appointment and time we had available patient got very upset and stated she did not want to schedule a Consult because what she needs is medication  Patient started using inappropriate language and stated we were wasting her time and hanged up

## 2018-02-28 ENCOUNTER — OFFICE VISIT (OUTPATIENT)
Dept: FAMILY MEDICINE CLINIC | Facility: CLINIC | Age: 48
End: 2018-02-28
Payer: COMMERCIAL

## 2018-02-28 VITALS
SYSTOLIC BLOOD PRESSURE: 130 MMHG | HEIGHT: 63 IN | HEART RATE: 88 BPM | TEMPERATURE: 99.3 F | BODY MASS INDEX: 29.77 KG/M2 | WEIGHT: 168 LBS | DIASTOLIC BLOOD PRESSURE: 80 MMHG | RESPIRATION RATE: 16 BRPM

## 2018-02-28 DIAGNOSIS — S05.02XA CORNEA ABRASION, LEFT, INITIAL ENCOUNTER: Primary | ICD-10-CM

## 2018-02-28 PROCEDURE — 99214 OFFICE O/P EST MOD 30 MIN: CPT | Performed by: FAMILY MEDICINE

## 2018-02-28 RX ORDER — CIPROFLOXACIN HYDROCHLORIDE 3.5 MG/ML
2 SOLUTION/ DROPS TOPICAL CONTINUOUS
Qty: 20 ML | Refills: 0 | Status: SHIPPED | OUTPATIENT
Start: 2018-02-28 | End: 2018-08-19

## 2018-02-28 NOTE — PROGRESS NOTES
Chief Complaint   Patient presents with    Eye Problem        Patient ID: Siri Delgado is a 56 y o  female  HPI    Pt is seeing for L eye FB sensation and redness x 1 day -  Was cleaning attic and felt "something went to L eye " -  Has been rubbing L eye since this am  - no vision changes     The following portions of the patient's history were reviewed and updated as appropriate: allergies, current medications, past family history, past medical history, past social history, past surgical history and problem list     Review of Systems   HENT: Negative  Eyes: Positive for redness  Negative for pain, discharge and visual disturbance  Respiratory: Negative  Skin: Negative  Allergic/Immunologic: Negative  Current Outpatient Prescriptions   Medication Sig Dispense Refill    ciprofloxacin (CILOXAN) 0 3 % ophthalmic solution Administer 2 drops into the left eye continuous Every 30 min for 6 h, then every 1 h for 1 day then every 4 y for 7 days 20 mL 0     No current facility-administered medications for this visit  Objective:    /80 (BP Location: Left arm, Patient Position: Sitting, Cuff Size: Standard)   Pulse 88   Temp 99 3 °F (37 4 °C) (Tympanic)   Resp 16   Ht 5' 3" (1 6 m)   Wt 76 2 kg (168 lb)   BMI 29 76 kg/m²        Physical Exam   Constitutional: She appears well-developed  No distress  HENT:   Head: Normocephalic  Eyes: Pupils are equal, round, and reactive to light  Lids are everted and swept, no foreign bodies found  Left eye exhibits no discharge and no exudate  No foreign body present in the left eye  Left conjunctiva is injected  Left conjunctiva has no hemorrhage  Left eye exhibits normal extraocular motion      possible cornea abrasion       Assessment/Plan:       Diagnoses and all orders for this visit:    Cornea abrasion, left, initial encounter  -     ciprofloxacin (CILOXAN) 0 3 % ophthalmic solution; Administer 2 drops into the left eye continuous Every 30 min for 6 h, then every 1 h for 1 day then every 4 y for 7 days          rto prn                   Katie Bonilla MD

## 2018-03-03 ENCOUNTER — HOSPITAL ENCOUNTER (EMERGENCY)
Facility: HOSPITAL | Age: 48
Discharge: HOME | End: 2018-03-03
Attending: EMERGENCY MEDICINE | Admitting: EMERGENCY MEDICINE
Payer: COMMERCIAL

## 2018-03-03 VITALS
DIASTOLIC BLOOD PRESSURE: 86 MMHG | RESPIRATION RATE: 18 BRPM | HEIGHT: 63 IN | TEMPERATURE: 99 F | SYSTOLIC BLOOD PRESSURE: 124 MMHG | OXYGEN SATURATION: 96 % | BODY MASS INDEX: 28.7 KG/M2 | WEIGHT: 162 LBS | HEART RATE: 88 BPM

## 2018-03-03 DIAGNOSIS — S05.00XA CORNEAL ABRASION: Primary | ICD-10-CM

## 2018-03-03 PROCEDURE — 99283 EMERGENCY DEPT VISIT LOW MDM: CPT

## 2018-03-03 PROCEDURE — 96372 THER/PROPH/DIAG INJ SC/IM: CPT

## 2018-03-03 RX ORDER — TETRACAINE HYDROCHLORIDE 5 MG/ML
2 SOLUTION OPHTHALMIC
COMMUNITY
End: 2018-08-19

## 2018-03-03 RX ORDER — TETRACAINE HYDROCHLORIDE 5 MG/ML
1 SOLUTION OPHTHALMIC ONCE
Status: COMPLETED | OUTPATIENT
Start: 2018-03-03 | End: 2018-03-03

## 2018-03-03 RX ORDER — KETOROLAC TROMETHAMINE 5 MG/ML
1 SOLUTION OPHTHALMIC 4 TIMES DAILY PRN
Qty: 5 ML | Refills: 0 | Status: SHIPPED | OUTPATIENT
Start: 2018-03-03 | End: 2018-08-19

## 2018-03-03 RX ORDER — KETOROLAC TROMETHAMINE 30 MG/ML
60 INJECTION, SOLUTION INTRAMUSCULAR; INTRAVENOUS ONCE
Status: COMPLETED | OUTPATIENT
Start: 2018-03-03 | End: 2018-03-03

## 2018-03-03 RX ADMIN — KETOROLAC TROMETHAMINE 60 MG: 30 INJECTION, SOLUTION INTRAMUSCULAR at 10:59

## 2018-03-03 RX ADMIN — TETRACAINE HYDROCHLORIDE 1 DROP: 5 SOLUTION OPHTHALMIC at 10:59

## 2018-03-03 RX ADMIN — FLUORESCEIN SODIUM 1 STRIP: 0.6 STRIP OPHTHALMIC at 10:59

## 2018-03-03 NOTE — DISCHARGE INSTRUCTIONS
Corneal Abrasion   WHAT YOU NEED TO KNOW:   A corneal abrasion is a scratch on the cornea of your eye  The cornea is the clear layer that covers the front of your eye  A small scratch may heal in 1 to 2 days  Deeper or larger scratches may take longer to heal         DISCHARGE INSTRUCTIONS:   Contact your healthcare provider if:   · Your eye pain or vision gets worse  · You have yellow or green drainage from your eye  · You have questions or concerns about your condition or care  Medicines:   · Medicines  may be given in the form of eyedrops or ointment to help prevent an eye infection  You may also be given eye drops to decrease pain  Ask how to take this medicine safely  · Take your medicine as directed  Contact your healthcare provider if you think your medicine is not helping or if you have side effects  Tell him or her if you are allergic to any medicine  Keep a list of the medicines, vitamins, and herbs you take  Include the amounts, and when and why you take them  Bring the list or the pill bottles to follow-up visits  Carry your medicine list with you in case of an emergency  Follow up with your healthcare provider as directed:  Write down your questions so you remember to ask them during your visits  Self-care:   · Do not touch or rub your eye  · Ask your healthcare provider when you can start your normal activities  · Ask your healthcare provider when you can wear your contact lenses  · Wear sunglasses in bright light until your eyes feel better  Help prevent corneal abrasions:   · Remove your contact lenses if your eyes feel dry or irritated  · Wash your hands if you need to touch your eyes or your face  · Trim your child's fingernails so he cannot scratch his eye  · Wear protective eyewear when you work with chemicals, wood, dust, or metal      · Wear protective eyewear when you play sports  · Do not wear your contacts for longer than you should       · Do not wear colored lenses or lenses with shapes on them  These lenses may cause eye damage and vision loss  · Do not wear glitter makeup  Glitter can easily get into your eyes and under contact lenses  · Do not sleep with your contacts  © 2017 [employer] Information is for End User's use only and may not be sold, redistributed or otherwise used for commercial purposes  All illustrations and images included in CareNotes® are the copyrighted property of [employer] , [employer]  or [employer]  The above information is an  only  It is not intended as medical advice for individual conditions or treatments  Talk to your doctor, nurse or pharmacist before following any medical regimen to see if it is safe and effective for you

## 2018-03-03 NOTE — ED PROVIDER NOTES
History  Chief Complaint   Patient presents with    Eye Injury     patient states she feels like she has something in her left eye, since Thursday  Saw PMD and started on Cipro and Tetracaine and states it is not improving  also c/o chronic left hip pain  Patient presents for evaluation of left eye pain  States she was getting something from storage on Thursday and something fell in her eye  Saw PMD and given Cipro drops and tetracaine drops, not improving  History provided by:  Patient   used: No        Prior to Admission Medications   Prescriptions Last Dose Informant Patient Reported? Taking?   ciprofloxacin (CILOXAN) 0 3 % ophthalmic solution 3/3/2018 at Unknown time  No Yes   Sig: Administer 2 drops into the left eye continuous Every 30 min for 6 h, then every 1 h for 1 day then every 4 y for 7 days   tetracaine 0 5 % ophthalmic solution 3/3/2018 at Unknown time  Yes Yes   Sig: Administer 2 drops into the left eye      Facility-Administered Medications: None       Past Medical History:   Diagnosis Date    Chronic hip pain, left        History reviewed  No pertinent surgical history  History reviewed  No pertinent family history  I have reviewed and agree with the history as documented  Social History   Substance Use Topics    Smoking status: Never Smoker    Smokeless tobacco: Never Used    Alcohol use No        Review of Systems   Eyes: Positive for pain and redness  All other systems reviewed and are negative        Physical Exam  ED Triage Vitals [03/03/18 0947]   Temperature Pulse Respirations Blood Pressure SpO2   99 °F (37 2 °C) 88 18 124/86 96 %      Temp Source Heart Rate Source Patient Position - Orthostatic VS BP Location FiO2 (%)   Tympanic Monitor Sitting -- --      Pain Score       Worst Possible Pain           Orthostatic Vital Signs  Vitals:    03/03/18 0947   BP: 124/86   Pulse: 88   Patient Position - Orthostatic VS: Sitting       Physical Exam Constitutional: She is oriented to person, place, and time  No distress  Eyes: EOM and lids are normal  Pupils are equal, round, and reactive to light  Lids are everted and swept, no foreign bodies found  Right conjunctiva is not injected  Left conjunctiva is injected  Slit lamp exam:       The left eye shows corneal abrasion and fluorescein uptake  The left eye shows no foreign body  Cardiovascular: Normal rate, regular rhythm and intact distal pulses  Pulmonary/Chest: Effort normal and breath sounds normal  No respiratory distress  Neurological: She is alert and oriented to person, place, and time  Skin: Capillary refill takes less than 2 seconds  She is not diaphoretic  Nursing note and vitals reviewed  ED Medications  Medications   tetracaine 0 5 % ophthalmic solution 1 drop (1 drop Left Eye Given 3/3/18 1059)   fluorescein sodium sterile ophthalmic strip 1 strip (1 strip Left Eye Given 3/3/18 1059)   ketorolac (TORADOL) injection 60 mg (60 mg Intramuscular Given 3/3/18 1059)       Diagnostic Studies  Results Reviewed     None                 No orders to display              Procedures  Procedures       Phone Contacts  ED Phone Contact    ED Course  ED Course                                MDM  Number of Diagnoses or Management Options  Corneal abrasion:   Diagnosis management comments: Pulse ox 96% on RA indicating adequate oxygenation    Patient instructed to stop using the tetracaine  Continue with abx drops and will at [facility] for pain  Needs to follow up with Ophthalmology          Amount and/or Complexity of Data Reviewed  Decide to obtain previous medical records or to obtain history from someone other than the patient: yes  Review and summarize past medical records: yes    Patient Progress  Patient progress: stable    CritCare Time    Disposition  Final diagnoses:   Corneal abrasion     Time reflects when diagnosis was documented in both MDM as applicable and the Disposition within this note     Time User Action Codes Description Comment    3/3/2018 11:35 AM Shorty Iraheta Denging Add [S05 00XA] Corneal abrasion       ED Disposition     ED Disposition Condition Comment    Discharge  Desiree Rothman discharge to home/self care  Condition at discharge: stable        Follow-up Information     Follow up With Specialties Details Why Azael Carvajal MD Ophthalmology In 1 day  [address]  [phone]          Discharge Medication List as of 3/3/2018 11:37 AM      CONTINUE these medications which have NOT CHANGED    Details   ciprofloxacin (CILOXAN) 0 3 % ophthalmic solution Administer 2 drops into the left eye continuous Every 30 min for 6 h, then every 1 h for 1 day then every 4 y for 7 days, Starting Wed 2/28/2018, Normal      tetracaine 0 5 % ophthalmic solution Administer 2 drops into the left eye, Historical Med           No discharge procedures on file      ED Provider  Electronically Signed by           Sandra Haile DO  03/03/18 8328

## 2018-03-06 ENCOUNTER — VBI (OUTPATIENT)
Dept: ADMINISTRATIVE | Facility: OTHER | Age: 48
End: 2018-03-06

## 2018-08-17 LAB — HBA1C MFR BLD HPLC: 5.4 %

## 2018-08-19 ENCOUNTER — HOSPITAL ENCOUNTER (EMERGENCY)
Facility: HOSPITAL | Age: 48
Discharge: HOME | End: 2018-08-19
Attending: EMERGENCY MEDICINE | Admitting: EMERGENCY MEDICINE
Payer: COMMERCIAL

## 2018-08-19 VITALS
WEIGHT: 171 LBS | SYSTOLIC BLOOD PRESSURE: 150 MMHG | BODY MASS INDEX: 30.29 KG/M2 | DIASTOLIC BLOOD PRESSURE: 82 MMHG | RESPIRATION RATE: 18 BRPM | HEART RATE: 89 BPM | TEMPERATURE: 99 F | OXYGEN SATURATION: 100 %

## 2018-08-19 DIAGNOSIS — S05.02XA CORNEAL ABRASION, LEFT: Primary | ICD-10-CM

## 2018-08-19 PROCEDURE — 99283 EMERGENCY DEPT VISIT LOW MDM: CPT

## 2018-08-19 RX ORDER — KETOROLAC TROMETHAMINE 5 MG/ML
1 SOLUTION OPHTHALMIC 4 TIMES DAILY
Status: DISCONTINUED | OUTPATIENT
Start: 2018-08-19 | End: 2018-08-19 | Stop reason: HOSPADM

## 2018-08-19 RX ORDER — KETOROLAC TROMETHAMINE 5 MG/ML
1 SOLUTION OPHTHALMIC EVERY 6 HOURS
Qty: 5 ML | Refills: 0 | Status: SHIPPED | OUTPATIENT
Start: 2018-08-19 | End: 2019-11-08

## 2018-08-19 RX ORDER — CIPROFLOXACIN HYDROCHLORIDE 3.5 MG/ML
1 SOLUTION/ DROPS TOPICAL
Qty: 5 ML | Refills: 0 | Status: SHIPPED | OUTPATIENT
Start: 2018-08-19 | End: 2019-11-08

## 2018-08-19 RX ORDER — CIPROFLOXACIN HYDROCHLORIDE 3.5 MG/ML
1 SOLUTION/ DROPS TOPICAL ONCE
Status: COMPLETED | OUTPATIENT
Start: 2018-08-19 | End: 2018-08-19

## 2018-08-19 RX ADMIN — CIPROFLOXACIN HYDROCHLORIDE 1 DROP: 3 SOLUTION/ DROPS OPHTHALMIC at 16:58

## 2018-08-19 RX ADMIN — KETOROLAC TROMETHAMINE 1 DROP: 5 SOLUTION OPHTHALMIC at 17:00

## 2018-08-19 NOTE — ED NOTES
Discharge instructions reviewed with patient  Patient states understanding  Patient discharged to home       Jose Carlos Bass RN  08/19/18 4582

## 2018-08-19 NOTE — ED PROVIDER NOTES
History  Chief Complaint   Patient presents with    Eye Pain     was in a garage yesterday and left eye has been swollen, painful and red since yesterday     Patient states she was cleaning the garage yesterday and was sweeping as well as blowing dirt off the floor  Patient felt a foreign body sensation in the left eye, associated with redness and tearing of the eye  Patient tried flushing the eye out several times at home and rubbed until she felt improved for awhile  Patient developed continued pain and tearing later associated with photophobia  She presents today for evaluation  Patient has had similar corneal abrasions in the past x2  None       Past Medical History:   Diagnosis Date    Chronic hip pain, left        Past Surgical History:   Procedure Laterality Date    ANKLE ARTHROPLASTY      BREAST LUMPECTOMY      BREAST SURGERY Bilateral     Enlargement    LAPAROSCOPY FOR ECTOPIC PREGNANCY      And Salpingectomy       Family History   Problem Relation Age of Onset    Arthritis Mother     Emphysema Mother     Breast cancer Maternal Aunt      I have reviewed and agree with the history as documented  Social History   Substance Use Topics    Smoking status: Never Smoker    Smokeless tobacco: Never Used      Comment: Former smoker, per allscripts    Alcohol use No      Comment: social drinker, per allscripts        Review of Systems   Constitutional: Negative for fever  Eyes: Positive for photophobia, pain, redness and visual disturbance  Negative for itching  Respiratory: Negative for cough  Cardiovascular: Negative for chest pain  Gastrointestinal: Negative for abdominal pain  All other systems reviewed and are negative  Physical Exam  Physical Exam   Constitutional: She appears well-developed and well-nourished  HENT:   Head: Normocephalic  Mouth/Throat: Oropharynx is clear and moist    Eyes:   The left eye is red injected    Even without floor seen corneal abrasion is evident with the naked eye  No foreign bodies present   Neck: Normal range of motion  Neck supple  Cardiovascular: Normal rate, regular rhythm and normal heart sounds  Pulmonary/Chest: Effort normal    Abdominal: Soft  There is no tenderness  Musculoskeletal: Normal range of motion  Skin: Skin is warm and dry  Psychiatric: She has a normal mood and affect  Her behavior is normal    Nursing note and vitals reviewed  Vital Signs  ED Triage Vitals [08/19/18 1623]   Temperature Pulse Respirations Blood Pressure SpO2   99 °F (37 2 °C) 89 18 150/82 100 %      Temp src Heart Rate Source Patient Position - Orthostatic VS BP Location FiO2 (%)   -- -- -- -- --      Pain Score       Worst Possible Pain           Vitals:    08/19/18 1623   BP: 150/82   Pulse: 89       Visual Acuity      ED Medications  Medications   ketorolac (ACULAR) 0 5 % ophthalmic solution 1 drop (1 drop Left Eye Given 8/19/18 1700)   ciprofloxacin (CILOXAN) 0 3 % ophthalmic solution 1 drop (1 drop Left Eye Given 8/19/18 1658)       Diagnostic Studies  Results Reviewed     None                 No orders to display              Procedures  Procedures       Phone Contacts  ED Phone Contact    ED Course                               MDM  Number of Diagnoses or Management Options  Corneal abrasion, left:   Diagnosis management comments: The eye was examined under fluorescein staining which is remarkable for a corneal abrasion directly in the central part of cornea overlying the pupil  The eye was flushed out with normal saline solution under pressure  The lid was everted and irrigated  Ciloxan and Acular were placed in the eye    CritCare Time    Disposition  Final diagnoses:   Corneal abrasion, left     Time reflects when diagnosis was documented in both MDM as applicable and the Disposition within this note     Time User Action Codes Description Comment    8/19/2018  4:51 PM Maxine Escamilla Add [S05  02XA] Corneal abrasion, left       ED Disposition     ED Disposition Condition Comment    Discharge  Darcella Matters discharge to home/self care  Condition at discharge: Stable        Follow-up Information     Follow up With Specialties Details Why [address], [address] [address]  [phone]      opthomologist  Schedule an appointment as soon as possible for a visit in 1 day  in your plan          Patient's Medications   Discharge Prescriptions    CIPROFLOXACIN (CILOXAN) 0 3 % OPHTHALMIC SOLUTION    Administer 1 drop into the left eye every 2 (two) hours       Start Date: 8/19/2018 End Date: --       Order Dose: 1 drop       Quantity: 5 mL    Refills: 0    KETOROLAC (ACULAR) 0 5 % OPHTHALMIC SOLUTION    Administer 1 drop into the left eye every 6 (six) hours       Start Date: 8/19/2018 End Date: --       Order Dose: 1 drop       Quantity: 5 mL    Refills: 0     No discharge procedures on file      ED Provider  Electronically Signed by           Merna Saeed MD  08/19/18 3130

## 2018-08-19 NOTE — DISCHARGE INSTRUCTIONS
Corneal Abrasion   WHAT YOU NEED TO KNOW:   A corneal abrasion is a scratch on the cornea of your eye  The cornea is the clear layer that covers the front of your eye  A small scratch may heal in 1 to 2 days  Deeper or larger scratches may take longer to heal         DISCHARGE INSTRUCTIONS:   Contact your healthcare provider if:   · Your eye pain or vision gets worse  · You have yellow or green drainage from your eye  · You have questions or concerns about your condition or care  Medicines:   · Medicines  may be given in the form of eyedrops or ointment to help prevent an eye infection  You may also be given eye drops to decrease pain  Ask how to take this medicine safely  · Take your medicine as directed  Contact your healthcare provider if you think your medicine is not helping or if you have side effects  Tell him or her if you are allergic to any medicine  Keep a list of the medicines, vitamins, and herbs you take  Include the amounts, and when and why you take them  Bring the list or the pill bottles to follow-up visits  Carry your medicine list with you in case of an emergency  Follow up with your healthcare provider as directed:  Write down your questions so you remember to ask them during your visits  Self-care:   · Do not touch or rub your eye  · Ask your healthcare provider when you can start your normal activities  · Ask your healthcare provider when you can wear your contact lenses  · Wear sunglasses in bright light until your eyes feel better  Help prevent corneal abrasions:   · Remove your contact lenses if your eyes feel dry or irritated  · Wash your hands if you need to touch your eyes or your face  · Trim your child's fingernails so he cannot scratch his eye  · Wear protective eyewear when you work with chemicals, wood, dust, or metal      · Wear protective eyewear when you play sports  · Do not wear your contacts for longer than you should       · Do not wear colored lenses or lenses with shapes on them  These lenses may cause eye damage and vision loss  · Do not wear glitter makeup  Glitter can easily get into your eyes and under contact lenses  · Do not sleep with your contacts in your eyes  © 2017 [address] [employer] Information is for End User's use only and may not be sold, redistributed or otherwise used for commercial purposes  All illustrations and images included in CareNotes® are the copyrighted property of A D A "[employer] , [employer]  or [employer] Estevez  The above information is an  only  It is not intended as medical advice for individual conditions or treatments  Talk to your doctor, nurse or pharmacist before following any medical regimen to see if it is safe and effective for you

## 2018-08-20 ENCOUNTER — TELEPHONE (OUTPATIENT)
Dept: ADMINISTRATIVE | Facility: OTHER | Age: 48
End: 2018-08-20

## 2018-08-23 ENCOUNTER — OFFICE VISIT (OUTPATIENT)
Dept: FAMILY MEDICINE CLINIC | Facility: CLINIC | Age: 48
End: 2018-08-23
Payer: COMMERCIAL

## 2018-08-23 VITALS
HEIGHT: 63 IN | BODY MASS INDEX: 30.3 KG/M2 | DIASTOLIC BLOOD PRESSURE: 80 MMHG | WEIGHT: 171 LBS | SYSTOLIC BLOOD PRESSURE: 122 MMHG | RESPIRATION RATE: 12 BRPM | TEMPERATURE: 97.8 F | HEART RATE: 72 BPM

## 2018-08-23 DIAGNOSIS — M25.552 PAIN OF LEFT HIP JOINT: ICD-10-CM

## 2018-08-23 DIAGNOSIS — Z01.818 PRE-OP EXAM: Primary | ICD-10-CM

## 2018-08-23 PROCEDURE — 99243 OFF/OP CNSLTJ NEW/EST LOW 30: CPT | Performed by: FAMILY MEDICINE

## 2018-08-23 RX ORDER — DULOXETIN HYDROCHLORIDE 30 MG/1
30 CAPSULE, DELAYED RELEASE ORAL DAILY
COMMUNITY
End: 2019-12-16 | Stop reason: SDUPTHER

## 2018-08-23 NOTE — PROGRESS NOTES
Chief Complaint   Patient presents with    Pre-op Exam     Total left hip per Dr Yara Rodriguez 9-5-18 fax [phone]        Patient ID: Deerk Michele is a 56 y o  female  48yo pt in for pre-op exam for left total hip replacement, Date of surgery 09/05/2018  Surgeon:Dr Osman Nguyen  Indication: OA/DJD  Steve Glass No acute c/o at time of visit and no known recent illness exposures  Pt reports no adverse reaction to any prior anesthesia received including one or more of the following-general, epidural and spinal     Pertinent medical and surgical history reviewed in problem lists  Pt able to walk 4 blocks or 2 flights of stairs without any concerning cardiovascular symptoms  Pt denies symptoms of easy bruising/bleeding/chest pain/palitations/new or changing edema/cough/wheezing/dyspnea  Pt denies excessive alcohol intake, tobacco or illicit drug use  Family hx is negative for adverse rxn to anesthesia, clotting or bleeding disorder  Ischemic heart disease, stroke, aneurysm or early sudden death  The patient's home  Living situation is secure and supportive and there are no post-op concerns in this regard  Dr Nidia Goltz      Past Medical History:   Diagnosis Date    Chronic hip pain, left        Past Surgical History:   Procedure Laterality Date    ANKLE ARTHROPLASTY      BREAST LUMPECTOMY      BREAST SURGERY Bilateral     Enlargement    LAPAROSCOPY FOR ECTOPIC PREGNANCY      And Salpingectomy       There is no problem list on file for this patient        Family History   Problem Relation Age of Onset    Arthritis Mother     Emphysema Mother     Breast cancer Maternal Aunt        Immunization History   Administered Date(s) Administered    Influenza Quadrivalent Preservative Free 3 years and older IM 09/12/2017    Tdap 05/18/2017       Allergies   Allergen Reactions    Peach [Prunus Persica] Other (See Comments)     Closure of throat with peaches, apricots,plums , nectarine ,and almonds  Vicodin [Hydrocodone-Acetaminophen] Itching       Current Outpatient Prescriptions   Medication Sig Dispense Refill    diclofenac sodium (VOLTAREN) 50 mg EC tablet Take 50 mg by mouth 2 (two) times a day  0    DULoxetine (CYMBALTA) 30 mg delayed release capsule Take 30 mg by mouth daily      ketorolac (ACULAR) 0 5 % ophthalmic solution Administer 1 drop into the left eye every 6 (six) hours 5 mL 0    ciprofloxacin (CILOXAN) 0 3 % ophthalmic solution Administer 1 drop into the left eye every 2 (two) hours (Patient not taking: Reported on 8/23/2018 ) 5 mL 0    diclofenac sodium (VOLTAREN) 50 mg EC tablet Take 1 tablet (50 mg total) by mouth 2 (two) times a day 10 tablet 0     No current facility-administered medications for this visit  Social History     Social History    Marital status: Single     Spouse name: N/A    Number of children: N/A    Years of education: N/A     Social History Main Topics    Smoking status: Never Smoker    Smokeless tobacco: Never Used      Comment: Former smoker, per allscripts    Alcohol use No      Comment: social drinker, per allscripts    Drug use: No    Sexual activity: Not Asked     Other Topics Concern    None     Social History Narrative    Daily caffeinated coffee consumption       Review of Systems      Objective:    /80 (BP Location: Left arm, Patient Position: Sitting, Cuff Size: Standard)   Pulse 72   Temp 97 8 °F (36 6 °C) (Tympanic)   Resp 12   Ht 5' 3" (1 6 m)   Wt 77 6 kg (171 lb)   BMI 30 29 kg/m²        Physical Exam   Constitutional: She is oriented to person, place, and time  She appears well-developed and well-nourished  overweight   HENT:   Head: Normocephalic and atraumatic  Right Ear: External ear normal    Left Ear: External ear normal    Nose: Nose normal    Mouth/Throat: Oropharynx is clear and moist    Eyes: Conjunctivae and EOM are normal  Pupils are equal, round, and reactive to light  Neck: Normal range of motion  Neck supple  No thyromegaly present  Cardiovascular: Normal rate, regular rhythm, normal heart sounds and intact distal pulses  Exam reveals no gallop and no friction rub  No murmur heard  Pulmonary/Chest: Effort normal and breath sounds normal    Abdominal: Soft  Bowel sounds are normal  There is no tenderness  Musculoskeletal: Normal range of motion  She exhibits no edema, tenderness or deformity  Neurological: She is alert and oriented to person, place, and time  She displays normal reflexes  No cranial nerve deficit or sensory deficit  She exhibits normal muscle tone  Coordination normal    Skin: Skin is warm and dry  Capillary refill takes less than 2 seconds  No rash noted  Psychiatric: She has a normal mood and affect  Nursing note and vitals reviewed  PATS reviewed inc labs/cxr/EKG-all wnl  Assessment/Plan:   Diagnoses and all orders for this visit:    Pre-op exam  Comments:  pt medically cleared for left THR-Dr Diamante Blanco  [facility]--[phone]; [phone] and [phone]  Pain of left hip joint  -     diclofenac sodium (VOLTAREN) 50 mg EC tablet; Take 1 tablet (50 mg total) by mouth 2 (two) times a day  -     Ambulatory referral to Orthopedic Surgery; Future    Other orders  -     diclofenac sodium (VOLTAREN) 50 mg EC tablet; Take 50 mg by mouth 2 (two) times a day  -     DULoxetine (CYMBALTA) 30 mg delayed release capsule;  Take 30 mg by mouth daily       Megan Lemus MD

## 2018-09-19 ENCOUNTER — OFFICE VISIT (OUTPATIENT)
Dept: FAMILY MEDICINE CLINIC | Facility: CLINIC | Age: 48
End: 2018-09-19
Payer: COMMERCIAL

## 2018-09-19 VITALS
OXYGEN SATURATION: 98 % | RESPIRATION RATE: 14 BRPM | HEIGHT: 63 IN | HEART RATE: 78 BPM | DIASTOLIC BLOOD PRESSURE: 68 MMHG | SYSTOLIC BLOOD PRESSURE: 98 MMHG | WEIGHT: 169 LBS | TEMPERATURE: 98 F | BODY MASS INDEX: 29.95 KG/M2

## 2018-09-19 DIAGNOSIS — L50.9 URTICARIA: Primary | ICD-10-CM

## 2018-09-19 PROCEDURE — 3008F BODY MASS INDEX DOCD: CPT | Performed by: FAMILY MEDICINE

## 2018-09-19 PROCEDURE — 99213 OFFICE O/P EST LOW 20 MIN: CPT | Performed by: FAMILY MEDICINE

## 2018-09-19 RX ORDER — METHYLPREDNISOLONE 4 MG/1
TABLET ORAL
Qty: 21 TABLET | Refills: 0 | Status: SHIPPED | OUTPATIENT
Start: 2018-09-19 | End: 2019-11-08

## 2018-09-19 RX ORDER — IBUPROFEN 200 MG
325 TABLET ORAL DAILY
Refills: 0 | COMMUNITY
Start: 2018-09-05 | End: 2020-05-15 | Stop reason: ALTCHOICE

## 2018-09-19 RX ORDER — RANITIDINE 150 MG/1
150 CAPSULE ORAL 2 TIMES DAILY
Qty: 30 CAPSULE | Refills: 0 | Status: SHIPPED | OUTPATIENT
Start: 2018-09-19 | End: 2019-11-08

## 2018-09-19 NOTE — PATIENT INSTRUCTIONS
Urticaria   AMBULATORY CARE:   Urticaria  is also called hives  Hives can change size and shape, and appear anywhere on your skin  They can be mild or severe and last from a few minutes to a few days  Hives may be a sign of a severe allergic reaction called anaphylaxis that needs immediate treatment  Urticaria that lasts longer than 6 weeks may be a chronic condition that needs long-term treatment  Call 911 for signs or symptoms of anaphylaxis,  such as trouble breathing, swelling in your mouth or throat, or wheezing  You may also have itching, a rash, or feel like you are going to faint  Seek care immediately if:   · Your heart is beating faster than it normally does  · You have cramping or severe pain in your abdomen  Contact your healthcare provider if:   · You have a fever  · Your skin still itches 24 hours after you take your medicine  · You still have hives after 7 days  · Your joints are painful and swollen  · You have questions or concerns about your condition or care  Steps to take for signs or symptoms of anaphylaxis:   · Immediately  give 1 shot of epinephrine only into the outer thigh muscle  · Leave the shot in place  as directed  Your healthcare provider may recommend you leave it in place for up to 10 seconds before you remove it  This helps make sure all of the epinephrine is delivered  · Call 911 and go to the emergency department,  even if the shot improved symptoms  Do not drive yourself  Bring the used epinephrine shot with you  Treatment for mild urticaria  may not be needed  Chronic urticaria may need to be treated with more than one medicine, or other medicines than listed below  The following are common medicines used to treat urticaria:  · Antihistamines  decrease mild symptoms such as itching or a rash  · Steroids  decrease redness, pain, and swelling  · Epinephrine  is used to treat severe allergic reactions such as anaphylaxis    Safety precautions to take if you are at risk for anaphylaxis:   · Keep 2 shots of epinephrine with you at all times  You may need a second shot, because epinephrine only works for about 20 minutes and symptoms may return  Your healthcare provider can show you and family members how to give the shot  Check the expiration date every month and replace it before it expires  · Create an action plan  Your healthcare provider can help you create a written plan that explains the allergy and an emergency plan to treat a reaction  The plan explains when to give a second epinephrine shot if symptoms return or do not improve after the first  Give copies of the action plan and emergency instructions to family members, work and school staff, and  providers  Show them how to give a shot of epinephrine  · Be careful when you exercise  If you have had exercise-induced anaphylaxis, do not exercise right after you eat  Stop exercising right away if you start to develop any signs or symptoms of anaphylaxis  You may first feel tired, warm, or have itchy skin  Hives, swelling, and severe breathing problems may develop if you continue to exercise  · Carry medical alert identification  Wear medical alert jewelry or carry a card that explains the allergy  Ask your healthcare provider where to get these items  · Keep a record of triggers and symptoms  Record everything you eat, drink, or apply to your skin for 3 weeks  Include stressful events and what you were doing right before your hives started  Bring the record with you to follow-up visits with your healthcare provider  Manage urticaria:   · Cool your skin  This may help decrease itching  Apply a cool pack to your hives  Dip a hand towel in cool water, wring it out, and place it on your hives  You may also soak your skin in a cool oatmeal bath  · Do not rub your hives  This can irritate your skin and cause more hives  · Wear loose clothing    Tight clothes may irritate your skin and cause more hives  · Manage stress  Stress may trigger hives, or make them worse  Learn new ways to relax, such as deep breathing  Follow up with your healthcare provider as directed:  Write down your questions so you remember to ask them during your visits  © 2017 [employer] [employer] Information is for End User's use only and may not be sold, redistributed or otherwise used for commercial purposes  All illustrations and images included in CareNotes® are the copyrighted property of A D A [employer]  or [employer]  The above information is an  only  It is not intended as medical advice for individual conditions or treatments  Talk to your doctor, nurse or pharmacist before following any medical regimen to see if it is safe and effective for you

## 2018-09-19 NOTE — PROGRESS NOTES
Assessment/Plan:                  Subjective:      Patient ID: Sarah Beth Ohara is a 56 y o  female  Chief Complaint   Patient presents with    Urticaria     all over body allergic reaction to a sauce she ate yesterday     55 yo pt -recent left hip surgery-Dr Angela charles from ortho stand[facility]  C/o today-+allergic rxn to ?peach/dylan salsa as outlined  S/p left hip 9/5      Urticaria   This is a new problem  The current episode started yesterday  The problem has been gradually worsening since onset  The affected locations include the abdomen, chest, torso, back, left arm and right arm  The rash is characterized by redness, swelling and itchiness  Associated with: ?peach Seferino Lauth salsa  Associated symptoms include fatigue  Pertinent negatives include no cough, facial edema, fever, shortness of breath or vomiting  Past treatments include antihistamine and anti-itch cream  The treatment provided no relief  Her past medical history is significant for allergies and eczema  The following portions of the patient's history were reviewed and updated as appropriate: allergies, current medications, past family history, past medical history, past social history, past surgical history and problem list      Review of Systems   Constitutional: Positive for fatigue  Negative for fever  HENT: Positive for postnasal drip  Negative for drooling and trouble swallowing  Eyes: Negative for visual disturbance  Respiratory: Negative for cough, chest tightness, shortness of breath and wheezing  Cardiovascular: Negative for chest pain and leg swelling  Gastrointestinal: Negative for vomiting  Skin: Positive for rash  Neurological: Negative            Objective:    BP 98/68 (BP Location: Left arm, Patient Position: Sitting, Cuff Size: Standard)   Pulse 78   Temp 98 °F (36 7 °C)   Resp 14   Ht 5' 3" (1 6 m)   Wt 76 7 kg (169 lb)   BMI 29 94 kg/m²        Physical Exam   Constitutional: She is oriented to person, place, and time  She appears well-developed and well-nourished  No distress  HENT:   Mouth/Throat: Oropharynx is clear and moist  No oropharyngeal exudate  No tongue/lip/oral swelling   Eyes: Conjunctivae and EOM are normal  Pupils are equal, round, and reactive to light  Neck: Neck supple  Cardiovascular: Normal rate, regular rhythm, normal heart sounds and intact distal pulses  Pulmonary/Chest: Effort normal and breath sounds normal  No respiratory distress  She has no wheezes  Abdominal: Soft  Bowel sounds are normal  There is no tenderness  Neurological: She is alert and oriented to person, place, and time  No cranial nerve deficit  Skin: Capillary refill takes less than 2 seconds  Rash noted  +urticarial rash b/l arms and truncal area  No facial involvement   Psychiatric: She has a normal mood and affect  Nursing note and vitals reviewed          Arely Bowling MD

## 2018-12-10 ENCOUNTER — OFFICE VISIT (OUTPATIENT)
Dept: FAMILY MEDICINE CLINIC | Facility: CLINIC | Age: 48
End: 2018-12-10
Payer: COMMERCIAL

## 2018-12-10 VITALS
RESPIRATION RATE: 12 BRPM | BODY MASS INDEX: 28.34 KG/M2 | DIASTOLIC BLOOD PRESSURE: 80 MMHG | WEIGHT: 160 LBS | TEMPERATURE: 97.9 F | HEART RATE: 72 BPM | SYSTOLIC BLOOD PRESSURE: 122 MMHG

## 2018-12-10 DIAGNOSIS — R68.89 FLU-LIKE SYMPTOMS: Primary | ICD-10-CM

## 2018-12-10 PROCEDURE — 99213 OFFICE O/P EST LOW 20 MIN: CPT | Performed by: NURSE PRACTITIONER

## 2018-12-10 RX ORDER — DEXTROMETHORPHAN HYDROBROMIDE AND PROMETHAZINE HYDROCHLORIDE 15; 6.25 MG/5ML; MG/5ML
5 SYRUP ORAL
Qty: 180 ML | Refills: 0 | Status: SHIPPED | OUTPATIENT
Start: 2018-12-10 | End: 2019-11-08

## 2018-12-10 RX ORDER — BENZONATATE 200 MG/1
200 CAPSULE ORAL 3 TIMES DAILY PRN
Qty: 20 CAPSULE | Refills: 0 | Status: SHIPPED | OUTPATIENT
Start: 2018-12-10 | End: 2020-05-15 | Stop reason: ALTCHOICE

## 2018-12-10 NOTE — PATIENT INSTRUCTIONS
Influenza   AMBULATORY CARE:   Influenza  (the flu) is an infection caused by the influenza virus  The flu is easily spread when an infected person coughs, sneezes, or has close contact with others  You may be able to spread the flu to others for 1 week or longer after signs or symptoms appear  Common signs and symptoms include the following:   · Fever and chills    · Headaches, body aches, and muscle or joint pain    · Cough, runny nose, and sore throat    · Loss of appetite, nausea, vomiting, or diarrhea    · Tiredness    · Trouble breathing  Call 911 for any of the following:   · You have trouble breathing, and your lips look purple or blue  · You have a seizure  Seek care immediately if:   · You are dizzy, or you are urinating less or not at all  · You have a headache with a stiff neck, and you feel tired or confused  · You have new pain or pressure in your chest     · Your symptoms, such as shortness of breath, vomiting, or diarrhea, get worse  · Your symptoms, such as fever and coughing, seem to get better, but then get worse  Contact your healthcare provider if:   · You have new muscle pain or weakness  · You have questions or concerns about your condition or care  Treatment for influenza  may include any of the following:  · Acetaminophen  decreases pain and fever  It is available without a doctor's order  Ask how much to take and how often to take it  Follow directions  Acetaminophen can cause liver damage if not taken correctly  · NSAIDs , such as ibuprofen, help decrease swelling, pain, and fever  This medicine is available with or without a doctor's order  NSAIDs can cause stomach bleeding or kidney problems in certain people  If you take blood thinner medicine, always ask your healthcare provider if NSAIDs are safe for you  Always read the medicine label and follow directions  · Antivirals  help fight a viral infection    Manage your symptoms:   · Rest  as much as you can to help you recover  · Drink liquids as directed  to help prevent dehydration  Ask how much liquid to drink each day and which liquids are best for you  Prevent the spread of the flu:   · Wash your hands often  Use soap and water  Wash your hands after you use the bathroom, change a child's diapers, or sneeze  Wash your hands before you prepare or eat food  Use gel hand cleanser when soap and water are not available  Do not touch your eyes, nose, or mouth unless you have washed your hands first            · Cover your mouth when you sneeze or cough  Cough into a tissue or the bend of your arm  · Clean shared items with a germ-killing   Clean table surfaces, doorknobs, and light switches  Do not share towels, silverware, and dishes with people who are sick  Wash bed sheets, towels, silverware, and dishes with soap and water  · Wear a mask  over your mouth and nose if you are sick or are near anyone who is sick  · Stay away from others  if you are sick  · Influenza vaccine  helps prevent influenza (flu)  Everyone older than 6 months should get a yearly influenza vaccine  Get the vaccine as soon as it is available, usually in September or October each year  Follow up with your healthcare provider as directed:  Write down your questions so you remember to ask them during your visits  © 2017 [employer] Information is for End User's use only and may not be sold, redistributed or otherwise used for commercial purposes  All illustrations and images included in CareNotes® are the copyrighted property of [employer]  or [employer]  The above information is an  only  It is not intended as medical advice for individual conditions or treatments  Talk to your doctor, nurse or pharmacist before following any medical regimen to see if it is safe and effective for you

## 2018-12-10 NOTE — PROGRESS NOTES
Yvonne Godoy is a 53 y o  female who presents for evaluation of sudden onset of influenza like symptoms  Symptoms include chills, dry cough, headache, myalgias, sinus and nasal congestion, sore throat and fever and have been present for 3 days  Denies shortness of breath, chest pain, dizziness, gi upset  Slightly better since onset  She has tried to alleviate the symptoms with acetaminophen and lemon water with minimal relief  High risk factors for influenza complications: none  Significant other has similar viral syndrome at present  Did not have flu vaccine this season    The following portions of the patient's history were reviewed and updated as appropriate: allergies, current medications, past family history, past medical history, past social history, past surgical history and problem list     Review of Systems  Pertinent items are noted in HPI       Objective     /80 (BP Location: Left arm, Patient Position: Sitting, Cuff Size: Standard)   Pulse 72   Temp 97 9 °F (36 6 °C) (Temporal)   Resp 12   Wt 72 6 kg (160 lb)   BMI 28 34 kg/m²   General appearance: alert and oriented, in no acute distress  Head: Normocephalic, without obvious abnormality, sinuses nontender to percussion  Ears: normal TM's and external ear canals both ears  Nose: clear discharge, turbinates red, swollen  Throat: abnormal findings: mild oropharyngeal erythema and no exudates  Lungs: clear to auscultation bilaterally  Heart: regular rate and rhythm, S1, S2 normal, no murmur, click, rub or gallop  Pulses: 2+ and symmetric  Lymph nodes: Cervical adenopathy: none  Neurologic: Grossly normal     Assessment/Plan     Influenza like syndrome  Supportive care with appropriate antipyretics and fluids  Educational material distributed and questions answered  Follow up in 1 week or as needed  Flu swab collected>>send out

## 2018-12-18 ENCOUNTER — TELEPHONE (OUTPATIENT)
Dept: FAMILY MEDICINE CLINIC | Facility: CLINIC | Age: 48
End: 2018-12-18

## 2018-12-18 DIAGNOSIS — J06.9 ACUTE URI: Primary | ICD-10-CM

## 2018-12-18 LAB
FLUAV RNA SPEC QL NAA+PROBE: POSITIVE
FLUBV RNA SPEC QL NAA+PROBE: NEGATIVE
FLUV SPEC CULT: ABNORMAL

## 2018-12-18 RX ORDER — AZITHROMYCIN 250 MG/1
TABLET, FILM COATED ORAL
Qty: 6 TABLET | Refills: 0 | Status: SHIPPED | OUTPATIENT
Start: 2018-12-18 | End: 2018-12-22

## 2018-12-18 NOTE — TELEPHONE ENCOUNTER
Jason Kam,     Patient was just at the pharmacy and she said that the cough syrup was not there and was wondering if you can recall it in for her?  Please call patient to advise when completed, TY

## 2018-12-18 NOTE — TELEPHONE ENCOUNTER
Everyone recovers differently from flu  She should avoid close contact for 7 days from time she became sick  I sent rx for promethazine cough syrup for bedtime  That is the most I can prescribe

## 2018-12-18 NOTE — TELEPHONE ENCOUNTER
I ordered 180ml of prometh at bedtime  Amt is 5 ml per dose  She should still have plenty left   I will send a zpack as well

## 2018-12-18 NOTE — TELEPHONE ENCOUNTER
Patient wants to know how long it will be until she starts feeling better  Patient still has a cough that is keeping her up all night  She wanted to know if there is anything you could send into pharmacy for her, since the holidays are coming and she doesn't want to infect other family members

## 2019-04-01 ENCOUNTER — TELEPHONE (OUTPATIENT)
Dept: FAMILY MEDICINE CLINIC | Facility: CLINIC | Age: 49
End: 2019-04-01

## 2019-04-02 DIAGNOSIS — F41.9 ANXIETY: Primary | ICD-10-CM

## 2019-04-02 RX ORDER — ALPRAZOLAM 0.25 MG/1
0.25 TABLET ORAL 3 TIMES DAILY PRN
Qty: 15 TABLET | Refills: 0 | Status: SHIPPED | OUTPATIENT
Start: 2019-04-02 | End: 2019-11-18 | Stop reason: SDUPTHER

## 2019-10-28 ENCOUNTER — APPOINTMENT (OUTPATIENT)
Dept: RADIOLOGY | Facility: CLINIC | Age: 49
End: 2019-10-28
Payer: COMMERCIAL

## 2019-10-28 ENCOUNTER — OFFICE VISIT (OUTPATIENT)
Dept: FAMILY MEDICINE CLINIC | Facility: CLINIC | Age: 49
End: 2019-10-28
Payer: COMMERCIAL

## 2019-10-28 VITALS
HEART RATE: 84 BPM | RESPIRATION RATE: 14 BRPM | TEMPERATURE: 97.7 F | DIASTOLIC BLOOD PRESSURE: 80 MMHG | SYSTOLIC BLOOD PRESSURE: 130 MMHG

## 2019-10-28 DIAGNOSIS — Z23 NEED FOR VACCINATION: ICD-10-CM

## 2019-10-28 DIAGNOSIS — Z12.31 ENCOUNTER FOR SCREENING MAMMOGRAM FOR BREAST CANCER: ICD-10-CM

## 2019-10-28 DIAGNOSIS — M25.562 ACUTE PAIN OF LEFT KNEE: Primary | ICD-10-CM

## 2019-10-28 DIAGNOSIS — M25.562 ACUTE PAIN OF LEFT KNEE: ICD-10-CM

## 2019-10-28 PROCEDURE — 90471 IMMUNIZATION ADMIN: CPT | Performed by: FAMILY MEDICINE

## 2019-10-28 PROCEDURE — 90686 IIV4 VACC NO PRSV 0.5 ML IM: CPT | Performed by: FAMILY MEDICINE

## 2019-10-28 PROCEDURE — 73562 X-RAY EXAM OF KNEE 3: CPT

## 2019-10-28 PROCEDURE — 99213 OFFICE O/P EST LOW 20 MIN: CPT | Performed by: FAMILY MEDICINE

## 2019-10-28 RX ORDER — ALPRAZOLAM 0.25 MG/1
0.25 TABLET ORAL 3 TIMES DAILY PRN
Qty: 15 TABLET | Refills: 0 | Status: CANCELLED | OUTPATIENT
Start: 2019-10-28

## 2019-10-28 RX ORDER — NAPROXEN 500 MG/1
500 TABLET ORAL 2 TIMES DAILY WITH MEALS
Qty: 30 TABLET | Refills: 0 | Status: SHIPPED | OUTPATIENT
Start: 2019-10-28 | End: 2019-11-16 | Stop reason: SDUPTHER

## 2019-10-28 NOTE — PROGRESS NOTES
Assessment/Plan:     Diagnoses and all orders for this visit:    Acute pain of left knee  -     Ambulatory referral to Orthopedic Surgery; Future  -     XR knee 3 vw left non injury; Future  -     naproxen (NAPROSYN) 500 mg tablet; Take 1 tablet (500 mg total) by mouth 2 (two) times a day with meals  Will give naproxen for pain as patient states 8/10  Advised precautions and to take with food  Patient to get xray of knee  Referral given to orthopedic  Need for vaccination  -     FLUZONE: influenza vaccine, quadrivalent, 0 5 mL    Encounter for screening mammogram for breast cancer  -     Mammo screening bilateral w 3d & cad; Future        Subjective:      Patient ID: Heike Carroll is a 53 y o  female  HPI  Knee Pain  Patient complains of left knee pain  This is evaluated as a personal injury  The pain began 10 days ago  The pain is located lateral   She describes the symptoms as sharp, shooting and lightening  Symptoms improve with nothing  The symptoms are worse with stair climbing, getting up from a chair, and sitting to long causes swelling  The knee has given out or felt unstable  The patient cannot bend and straighten the knee fully  The patient is active in none  Patient thinks it started 10 days ago where she went to get up and her leg went one way and her knee went another way and heard a pop  She is using a brace at this time where she is able to walk but is still having pain  Treatment to date has been ice, heat, Tylenol, NSAID's, without significant relief  The following portions of the patient's history were reviewed and updated as appropriate: allergies, current medications, past family history, past medical history, past social history, past surgical history and problem list     Review of Systems   Constitutional: Negative for appetite change  HENT: Negative for sore throat and voice change  Respiratory: Negative for shortness of breath      Cardiovascular: Negative for chest pain and leg swelling  Gastrointestinal: Negative for abdominal pain, diarrhea, nausea and vomiting  Musculoskeletal: Positive for joint swelling  Negative for arthralgias  Hip pain      Skin: Negative for rash  Neurological: Positive for weakness  Psychiatric/Behavioral: Negative for agitation and behavioral problems  Objective:      /80 (BP Location: Left arm, Patient Position: Sitting, Cuff Size: Standard)   Pulse 84   Temp 97 7 °F (36 5 °C) (Tympanic)   Resp 14          Physical Exam   Constitutional: She is oriented to person, place, and time  She appears well-developed and well-nourished  No distress  HENT:   Head: Normocephalic and atraumatic  Nose: Nose normal    Eyes: EOM are normal  Right eye exhibits no discharge  Left eye exhibits no discharge  Cardiovascular: Normal rate, regular rhythm, normal heart sounds and intact distal pulses  No murmur heard  Pulmonary/Chest: Effort normal and breath sounds normal  No respiratory distress  Neurological: She is alert and oriented to person, place, and time  Skin: Skin is warm  No rash noted  Psychiatric: She has a normal mood and affect  Her behavior is normal      Right Knee Exam   Right knee exam is normal       Left Knee Exam     Tenderness   The patient is experiencing tenderness in the lateral joint line, lateral retinaculum and LCL  Tests   Thor:  Left knee medial Thor test: slight     Varus: positive (slight)   Lachman:  Anterior - negative    Posterior - negative  Patellar apprehension: negative    Other   Erythema: absent  Scars: absent  Sensation: normal  Pulse: present  Swelling: mild

## 2019-10-29 ENCOUNTER — OFFICE VISIT (OUTPATIENT)
Dept: OBGYN CLINIC | Facility: CLINIC | Age: 49
End: 2019-10-29
Payer: COMMERCIAL

## 2019-10-29 VITALS
SYSTOLIC BLOOD PRESSURE: 125 MMHG | HEIGHT: 63 IN | DIASTOLIC BLOOD PRESSURE: 83 MMHG | HEART RATE: 85 BPM | WEIGHT: 168.4 LBS | BODY MASS INDEX: 29.84 KG/M2

## 2019-10-29 DIAGNOSIS — M25.562 ACUTE PAIN OF LEFT KNEE: ICD-10-CM

## 2019-10-29 DIAGNOSIS — M25.462 EFFUSION OF LEFT KNEE: Primary | ICD-10-CM

## 2019-10-29 PROCEDURE — 99213 OFFICE O/P EST LOW 20 MIN: CPT | Performed by: ORTHOPAEDIC SURGERY

## 2019-10-29 NOTE — PROGRESS NOTES
Assessment/Plan:  1  Effusion of left knee     2  Acute pain of left knee  Ambulatory referral to Orthopedic Surgery    Lyme Antibody Profile with reflex to WB    Lyme disease, PCR    MRI knee left  wo contrast    Lyme Antibody Profile with reflex to WB    Lyme disease, PCR     As for the left knee, the patient does have a significant effusion on examination today  She has failed conservative treatment thus far with physical therapy  I am ordering a Lyme test since the patient has had multiple exposures to tick bites  I am also ordering an MRI of the knee to rule out meniscal tear  As for the left hip, I am referring her to our joint replacement specialist Dr Vazquez Montalvo for a 2nd opinion  She had initially seen him for her left hip prior to her total hip replacement, and he felt a total hip replacement was not warranted at that time  She is now over a year out from left hip replacement and has significant pain and disability with the hip  She likely will require more advanced imaging to rule out loosening of the prosthesis or infection  I did advise her to obtain her operative report prior to seeing Dr Vazquez Montalvo  We will call the patient with her line results when these are available and will see her back after her MRI to discuss the results and how to proceed  In the meantime, she may ice the knee take over-the-counter medications as needed for discomfort  Subjective:   Pilo Copeland is a 53 y o  female who presents for evaluation of her left knee  She states that several months ago she started with swelling and tightness about the left knee  She has been doing physical therapy for the left hip as she did have hip replacement back in September of last year and still having pain in the hip  They did incorporate some knee exercises swell  She states that this did not significantly help the knee    About a week and half ago she then tweaked her knee and felt a pop, and since that time has had more pain about the knee  Prior to this was more tightness  She notes limitations in flexion of the knee due to swelling  Her pain seems to be worse with activity and better with wearing her knee brace  She does note several recent tick bites as her significant other is a taxidermist   Her most recent event was 2 weeks ago, though she notes getting bit very frequently  She has not been tested for Lyme recently  She did have x-rays of the knee prior to seeing us which showed some mild patellofemoral arthritis, but were otherwise negative  We are able to view these images today  Review of Systems   Constitutional: Negative  Negative for chills and fever  HENT: Negative  Negative for ear pain and sore throat  Eyes: Negative  Negative for pain and redness  Respiratory: Negative  Negative for shortness of breath and wheezing  Cardiovascular: Negative for chest pain and palpitations  Gastrointestinal: Negative  Negative for abdominal pain and blood in stool  Endocrine: Negative  Negative for polydipsia and polyuria  Genitourinary: Negative  Negative for difficulty urinating and dysuria  Musculoskeletal:        As noted in HPI   Skin: Negative  Negative for pallor and rash  Neurological: Negative  Negative for dizziness and numbness  Hematological: Negative  Negative for adenopathy  Does not bruise/bleed easily  Psychiatric/Behavioral: Negative  Negative for confusion and suicidal ideas           Past Medical History:   Diagnosis Date    Chronic hip pain, left        Past Surgical History:   Procedure Laterality Date    ANKLE ARTHROPLASTY      BREAST LUMPECTOMY      BREAST SURGERY Bilateral     Enlargement    LAPAROSCOPY FOR ECTOPIC PREGNANCY      And Salpingectomy       Family History   Problem Relation Age of Onset    Arthritis Mother     Emphysema Mother     Breast cancer Maternal Aunt     No Known Problems Father     No Known Problems Sister     No Known Problems Brother  No Known Problems Maternal Uncle     No Known Problems Paternal Aunt     No Known Problems Paternal Uncle     No Known Problems Maternal Grandmother     No Known Problems Maternal Grandfather     No Known Problems Paternal Grandmother     No Known Problems Paternal Grandfather        Social History     Occupational History    Not on file   Tobacco Use    Smoking status: Never Smoker    Smokeless tobacco: Never Used    Tobacco comment: Former smoker, per allscripts   Substance and Sexual Activity    Alcohol use: No     Comment: social drinker, per allscripts    Drug use: No    Sexual activity: Not on file         Current Outpatient Medications:     ALPRAZolam (XANAX) 0 25 mg tablet, Take 1 tablet (0 25 mg total) by mouth 3 (three) times a day as needed for anxiety or sleep, Disp: 15 tablet, Rfl: 0    naproxen (NAPROSYN) 500 mg tablet, Take 1 tablet (500 mg total) by mouth 2 (two) times a day with meals, Disp: 30 tablet, Rfl: 0    benzonatate (TESSALON) 200 MG capsule, Take 1 capsule (200 mg total) by mouth 3 (three) times a day as needed for cough (Patient not taking: Reported on 10/28/2019), Disp: 20 capsule, Rfl: 0    ciprofloxacin (CILOXAN) 0 3 % ophthalmic solution, Administer 1 drop into the left eye every 2 (two) hours (Patient not taking: Reported on 8/23/2018 ), Disp: 5 mL, Rfl: 0    diclofenac sodium (VOLTAREN) 50 mg EC tablet, Take 50 mg by mouth 2 (two) times a day, Disp: , Rfl: 0    diclofenac sodium (VOLTAREN) 50 mg EC tablet, Take 1 tablet (50 mg total) by mouth 2 (two) times a day (Patient not taking: Reported on 9/19/2018 ), Disp: 10 tablet, Rfl: 0    DULoxetine (CYMBALTA) 30 mg delayed release capsule, Take 30 mg by mouth daily, Disp: , Rfl:     ketorolac (ACULAR) 0 5 % ophthalmic solution, Administer 1 drop into the left eye every 6 (six) hours (Patient not taking: Reported on 9/19/2018 ), Disp: 5 mL, Rfl: 0    Methylprednisolone 4 MG TBPK, Use as directed on package (Patient not taking: Reported on 12/10/2018 ), Disp: 21 tablet, Rfl: 0    promethazine-dextromethorphan (PHENERGAN-DM) 6 25-15 mg/5 mL oral syrup, Take 5 mL by mouth daily at bedtime as needed for cough (Patient not taking: Reported on 10/28/2019), Disp: 180 mL, Rfl: 0    RA ASPIRIN  MG EC tablet, Take 325 mg by mouth daily, Disp: , Rfl: 0    ranitidine (ZANTAC) 150 MG capsule, Take 1 capsule (150 mg total) by mouth 2 (two) times a day (Patient not taking: Reported on 12/10/2018 ), Disp: 30 capsule, Rfl: 0    Allergies   Allergen Reactions    Peach [Prunus Persica] Other (See Comments)     Closure of throat with peaches, apricots,plums , nectarine ,and almonds    Vicodin [Hydrocodone-Acetaminophen] Itching       Objective:  Vitals:    10/29/19 0934   BP: 125/83   Pulse: 85       Left Knee Exam     Tenderness   The patient is experiencing tenderness in the medial joint line and lateral retinaculum (tenderness superolateral knee)  Range of Motion   Extension: 0   Flexion: 90     Tests   Varus: negative Valgus: negative  Drawer:  Anterior - negative     Posterior - negative  Patellar apprehension: negative    Other   Erythema: absent  Sensation: normal  Pulse: present  Effusion: 2+    Comments:  Patient can only actively flexion her left hip to 50 degrees with severe pain in the groin  Observations   Left Knee   Effusion: 2+       Physical Exam   Constitutional: She is oriented to person, place, and time  She appears well-developed and well-nourished  No distress  HENT:   Head: Normocephalic and atraumatic  Eyes: Conjunctivae and EOM are normal  No scleral icterus  Neck: No JVD present  Cardiovascular: Normal rate and intact distal pulses  Pulmonary/Chest: Effort normal  No respiratory distress  Musculoskeletal:        Left knee: Effusion: 2+   Neurological: She is alert and oriented to person, place, and time  Coordination normal    Skin: Skin is warm     Psychiatric: She has a normal mood and affect  I have personally reviewed pertinent films in PACS and my interpretation is as follows:  X-rays left knee:  Mild patellofemoral joint arthritis, otherwise negative

## 2019-11-06 ENCOUNTER — TELEPHONE (OUTPATIENT)
Dept: OTHER | Facility: OTHER | Age: 49
End: 2019-11-06

## 2019-11-06 NOTE — TELEPHONE ENCOUNTER
0902: Voice Mail Message Retrieved: Pt was called and advised she needs a referral for her appointment on November 8th and is requesting a referral be sent via fax to Dr Harris Betancourt @ [phone]

## 2019-11-08 ENCOUNTER — OFFICE VISIT (OUTPATIENT)
Dept: OBGYN CLINIC | Facility: CLINIC | Age: 49
End: 2019-11-08
Payer: COMMERCIAL

## 2019-11-08 ENCOUNTER — TELEPHONE (OUTPATIENT)
Dept: OBGYN CLINIC | Facility: CLINIC | Age: 49
End: 2019-11-08

## 2019-11-08 ENCOUNTER — APPOINTMENT (OUTPATIENT)
Dept: RADIOLOGY | Facility: CLINIC | Age: 49
End: 2019-11-08
Payer: COMMERCIAL

## 2019-11-08 VITALS
BODY MASS INDEX: 30.41 KG/M2 | WEIGHT: 171.6 LBS | SYSTOLIC BLOOD PRESSURE: 129 MMHG | HEIGHT: 63 IN | DIASTOLIC BLOOD PRESSURE: 85 MMHG | HEART RATE: 73 BPM

## 2019-11-08 DIAGNOSIS — M70.72 ILIOPSOAS BURSITIS OF LEFT HIP: ICD-10-CM

## 2019-11-08 DIAGNOSIS — Z96.642 STATUS POST LEFT HIP REPLACEMENT: ICD-10-CM

## 2019-11-08 DIAGNOSIS — M25.552 PAIN IN LEFT HIP: Primary | ICD-10-CM

## 2019-11-08 DIAGNOSIS — M25.552 PAIN IN LEFT HIP: ICD-10-CM

## 2019-11-08 DIAGNOSIS — M70.62 GREATER TROCHANTERIC BURSITIS OF LEFT HIP: ICD-10-CM

## 2019-11-08 PROCEDURE — 99214 OFFICE O/P EST MOD 30 MIN: CPT | Performed by: ORTHOPAEDIC SURGERY

## 2019-11-08 PROCEDURE — 73502 X-RAY EXAM HIP UNI 2-3 VIEWS: CPT

## 2019-11-08 NOTE — PROGRESS NOTES
Assessment/Plan:  1  Pain in left hip  XR hip/pelv 2-3 vws left if performed    Sedimentation rate, automated    C-reactive protein    MRI hip left wo contrast    Sedimentation rate, automated    C-reactive protein   2  Status post left hip replacement  Sedimentation rate, automated    C-reactive protein    MRI hip left wo contrast    Sedimentation rate, automated    C-reactive protein   3  Iliopsoas bursitis of left hip  MRI hip left wo contrast   4  Greater trochanteric bursitis of left hip       Patient presents today as a 2nd opinion for her painful left total hip arthroplasty  She has last known to me for treatment of her left hip greater troch bursitis in 2017  Since that time she received a total hip replacement at [facility] in September of 2018 and has had pain in her hip since December of 2018  On examination today I do feel that she does present with a clinical picture consistent with iliopsoas tendinitis/pathology likely due to her slightly retroverted cup  We discussed this here in the office today  She is aware that for correction of this she would need revision surgery  I have ordered an MRI with metal suppression to determine the integrity of the iliopsoas tendon to further delineate this issue  She does have significantly painful range of motion here on exam   I did order an ESR and CRP to initiate a workup for possible underlying periprosthetic joint infection however this is less likely seeing that her pain has been present since December of 2018 and her symptoms have remained constant rather than progressive  I will see her back in the office to discuss the results of her MRI and serology testing and to further delineate my recommendations as her 2nd opinion  Subjective:  Painful left total hip arthroplasty    Patient ID: Brea Quiñones is a 51 y o  female      HPI  Patient is a 81-year-old female here with complaints of persistent pain since December of 2018 after having undergone left total hip arthroplasty in September of 2018 by Dr Bandar Pinto of [facility]  Patient is last known to me for treatment of her left hip greater troch bursitis in 2017  She has not followed up with me since 2017  At some point in 2018 she was told that she had bone-on-bone arthritis in needed a hip replacement  After a 3 day stay in the hospital she denies a complicated postoperative course  She states that from September 2018 to December of 2018 shows without issue and doing well  She reports a fall in December of 2018 and this is what she credits the onset of her pain to  She was evaluated by her primary surgeon after that fall and was reassured that all is okay and that there were no issues with her hip replacement  Since December of 2018 she has had significant pain in the groin as well as the same pain in the lateral aspect of her hip that I had treated her for in 2017  She now reports a constant sharp pain that is localized over the anterior groin in addition to her lateral hip pain  The pain is present all day  She denies any paresthesias down the leg or thigh  She denies any sensation of instability  She is at the point now where lifting her leg to get into a chair or into bed is ex significantly painful for her  She has difficulty flexing her hip  She has decreased mobility and difficulty getting her left groin pain  She is here for 2nd opinion today on her painful prosthesis      Review of Systems   Constitutional: Positive for activity change  HENT: Negative  Eyes: Negative  Respiratory: Negative  Cardiovascular: Negative  Gastrointestinal: Negative  Endocrine: Negative  Musculoskeletal: Positive for arthralgias  Skin: Negative  Neurological: Negative  Psychiatric/Behavioral: Negative            Past Medical History:   Diagnosis Date    Chronic hip pain, left        Past Surgical History:   Procedure Laterality Date    ANKLE ARTHROPLASTY  BREAST LUMPECTOMY      BREAST SURGERY Bilateral     Enlargement    LAPAROSCOPY FOR ECTOPIC PREGNANCY      And Salpingectomy       Family History   Problem Relation Age of Onset    Arthritis Mother     Emphysema Mother     Breast cancer Maternal Aunt     No Known Problems Father     No Known Problems Sister     No Known Problems Brother     No Known Problems Maternal Uncle     No Known Problems Paternal Aunt     No Known Problems Paternal Uncle     No Known Problems Maternal Grandmother     No Known Problems Maternal Grandfather     No Known Problems Paternal Grandmother     No Known Problems Paternal Grandfather        Social History     Occupational History    Not on file   Tobacco Use    Smoking status: Never Smoker    Smokeless tobacco: Never Used    Tobacco comment: Former smoker, per allscripts   Substance and Sexual Activity    Alcohol use: No     Comment: social drinker, per allscripts    Drug use: No    Sexual activity: Not on file         Current Outpatient Medications:     ALPRAZolam (XANAX) 0 25 mg tablet, Take 1 tablet (0 25 mg total) by mouth 3 (three) times a day as needed for anxiety or sleep, Disp: 15 tablet, Rfl: 0    diclofenac sodium (VOLTAREN) 50 mg EC tablet, Take 50 mg by mouth 2 (two) times a day, Disp: , Rfl: 0    DULoxetine (CYMBALTA) 30 mg delayed release capsule, Take 30 mg by mouth daily, Disp: , Rfl:     naproxen (NAPROSYN) 500 mg tablet, Take 1 tablet (500 mg total) by mouth 2 (two) times a day with meals, Disp: 30 tablet, Rfl: 0    RA ASPIRIN  MG EC tablet, Take 325 mg by mouth daily, Disp: , Rfl: 0    benzonatate (TESSALON) 200 MG capsule, Take 1 capsule (200 mg total) by mouth 3 (three) times a day as needed for cough (Patient not taking: Reported on 11/8/2019), Disp: 20 capsule, Rfl: 0    Allergies   Allergen Reactions    Peach [Prunus Persica] Other (See Comments)     Closure of throat with peaches, apricots,plums , nectarine ,and almonds    Vicodin [Hydrocodone-Acetaminophen] Itching       Objective:  Vitals:    11/08/19 0940   BP: 129/85   Pulse: 73       Body mass index is 30 89 kg/m²  Left Hip Exam     Tenderness   The patient is experiencing tenderness in the lateral and anterior  Range of Motion   Abduction: 30   Adduction: 20   Extension: 0   Flexion: 100   External rotation: 30   Internal rotation: 10     Muscle Strength   Abduction: 4/5   Adduction: 4/5   Flexion: 3/5     Other   Erythema: absent  Scars: present  Sensation: normal  Pulse: present    Comments: On gross inspection there is a large longitudinal lateral hip scar from her primary hip replacement procedure  She does have significant tenderness over the greater trochanter  There is no sign of infection or delayed wound healing at her surgical site  The skin is not red  There is no sign of trauma  There is no swelling around the thigh  She has tenderness palpation along the anterior medial thigh down to the level of the lesser troch  She has decreased active range of motion in hip flexion  She has pain with passive range of motion in all planes especially external rotation and internal rotation localized to the groin  No leg length discrepancy  Neurovascular intact  She has significant difficulty getting up on to the exam table with decreased hip flexion capabilities  Physical Exam   Constitutional: She is oriented to person, place, and time  She appears well-developed  HENT:   Head: Atraumatic  Eyes: EOM are normal    Neck: Neck supple  Cardiovascular: Normal rate  Pulmonary/Chest: Effort normal    Musculoskeletal:   See orthopedic exam   Neurological: She is alert and oriented to person, place, and time  Skin: Skin is warm and dry  Psychiatric: She has a normal mood and affect  Nursing note and vitals reviewed  I have personally reviewed pertinent films in PACS    X-rays of the left hip and pelvis obtained here in the office today demonstrate an uncemented left total hip arthroplasty  There is no overt signs of loosening  There is no sign of failure  On the cross-table lateral the acetabular component is slightly retroverted and is prominent over the anterior aspect of the acetabulum  Alexandra RICHARDS    Division of Adult Reconstruction  Department of Orthopaedic Surgery  Former[facility]

## 2019-11-08 NOTE — TELEPHONE ENCOUNTER
Patient had joint replacement surgery on Left Hip 09/2018 by Dr Lb Zamarripa [place] at [address]  I did speak with someone from his office who took down my direct ext and our direct office number

## 2019-11-13 ENCOUNTER — HOSPITAL ENCOUNTER (OUTPATIENT)
Dept: RADIOLOGY | Facility: HOSPITAL | Age: 49
Discharge: HOME | End: 2019-11-13
Payer: COMMERCIAL

## 2019-11-13 DIAGNOSIS — M25.562 ACUTE PAIN OF LEFT KNEE: ICD-10-CM

## 2019-11-13 PROCEDURE — 73721 MRI JNT OF LWR EXTRE W/O DYE: CPT

## 2019-11-16 DIAGNOSIS — F41.9 ANXIETY: ICD-10-CM

## 2019-11-16 DIAGNOSIS — M25.562 ACUTE PAIN OF LEFT KNEE: ICD-10-CM

## 2019-11-16 RX ORDER — ALPRAZOLAM 0.25 MG/1
0.25 TABLET ORAL 3 TIMES DAILY PRN
Qty: 15 TABLET | Refills: 0 | Status: CANCELLED | OUTPATIENT
Start: 2019-11-16

## 2019-11-18 DIAGNOSIS — F41.9 ANXIETY: ICD-10-CM

## 2019-11-18 DIAGNOSIS — M25.562 ACUTE PAIN OF LEFT KNEE: ICD-10-CM

## 2019-11-18 RX ORDER — ALPRAZOLAM 0.25 MG/1
0.25 TABLET ORAL 3 TIMES DAILY PRN
Qty: 15 TABLET | Refills: 0 | Status: CANCELLED | OUTPATIENT
Start: 2019-11-18

## 2019-11-18 RX ORDER — NAPROXEN 500 MG/1
500 TABLET ORAL 2 TIMES DAILY WITH MEALS
Qty: 30 TABLET | Refills: 0 | Status: SHIPPED | OUTPATIENT
Start: 2019-11-18 | End: 2019-12-16 | Stop reason: SDUPTHER

## 2019-11-18 RX ORDER — NAPROXEN 500 MG/1
500 TABLET ORAL 2 TIMES DAILY WITH MEALS
Qty: 30 TABLET | Refills: 0 | Status: SHIPPED | OUTPATIENT
Start: 2019-11-18 | End: 2020-05-15 | Stop reason: ALTCHOICE

## 2019-11-18 RX ORDER — ALPRAZOLAM 0.25 MG/1
0.25 TABLET ORAL 3 TIMES DAILY PRN
Qty: 15 TABLET | Refills: 0 | Status: SHIPPED | OUTPATIENT
Start: 2019-11-18 | End: 2019-12-16 | Stop reason: SDUPTHER

## 2019-11-22 ENCOUNTER — TELEPHONE (OUTPATIENT)
Dept: OBGYN CLINIC | Facility: CLINIC | Age: 49
End: 2019-11-22

## 2019-11-26 NOTE — TELEPHONE ENCOUNTER
P2P done;  Authorization # [id number] valid through 2/1/20 for left hip MRI with MAVERICK suppression

## 2019-12-02 ENCOUNTER — TELEPHONE (OUTPATIENT)
Dept: OBGYN CLINIC | Facility: CLINIC | Age: 49
End: 2019-12-02

## 2019-12-02 NOTE — TELEPHONE ENCOUNTER
Spoke w patient who requested fu apt with Dr Cassandra Gibbs to go over MRI of L knee  Patient will call back to schedule

## 2019-12-04 DIAGNOSIS — F41.9 ANXIETY: ICD-10-CM

## 2019-12-04 RX ORDER — ALPRAZOLAM 0.25 MG/1
0.25 TABLET ORAL 3 TIMES DAILY PRN
Qty: 15 TABLET | Refills: 0 | Status: CANCELLED | OUTPATIENT
Start: 2019-12-04

## 2019-12-05 DIAGNOSIS — F41.9 ANXIETY: ICD-10-CM

## 2019-12-05 RX ORDER — ALPRAZOLAM 0.25 MG/1
0.25 TABLET ORAL 3 TIMES DAILY PRN
Qty: 15 TABLET | Refills: 0 | OUTPATIENT
Start: 2019-12-05

## 2019-12-06 DIAGNOSIS — F41.9 ANXIETY: ICD-10-CM

## 2019-12-06 RX ORDER — ALPRAZOLAM 0.25 MG/1
0.25 TABLET ORAL 3 TIMES DAILY PRN
Qty: 15 TABLET | Refills: 0 | OUTPATIENT
Start: 2019-12-06

## 2019-12-11 ENCOUNTER — TELEPHONE (OUTPATIENT)
Dept: FAMILY MEDICINE CLINIC | Facility: CLINIC | Age: 49
End: 2019-12-11

## 2019-12-11 ENCOUNTER — OFFICE VISIT (OUTPATIENT)
Dept: OBGYN CLINIC | Facility: CLINIC | Age: 49
End: 2019-12-11
Payer: COMMERCIAL

## 2019-12-11 VITALS
WEIGHT: 171.2 LBS | SYSTOLIC BLOOD PRESSURE: 121 MMHG | BODY MASS INDEX: 30.33 KG/M2 | HEART RATE: 76 BPM | HEIGHT: 63 IN | DIASTOLIC BLOOD PRESSURE: 78 MMHG

## 2019-12-11 DIAGNOSIS — M25.462 EFFUSION OF LEFT KNEE: ICD-10-CM

## 2019-12-11 DIAGNOSIS — M17.12 PRIMARY OSTEOARTHRITIS OF LEFT KNEE: Primary | ICD-10-CM

## 2019-12-11 DIAGNOSIS — F41.9 ANXIETY: ICD-10-CM

## 2019-12-11 PROCEDURE — 99214 OFFICE O/P EST MOD 30 MIN: CPT | Performed by: ORTHOPAEDIC SURGERY

## 2019-12-11 PROCEDURE — 20610 DRAIN/INJ JOINT/BURSA W/O US: CPT | Performed by: ORTHOPAEDIC SURGERY

## 2019-12-11 RX ORDER — DEXAMETHASONE SODIUM PHOSPHATE 100 MG/10ML
40 INJECTION INTRAMUSCULAR; INTRAVENOUS
Status: COMPLETED | OUTPATIENT
Start: 2019-12-11 | End: 2019-12-11

## 2019-12-11 RX ORDER — LIDOCAINE HYDROCHLORIDE 10 MG/ML
4 INJECTION, SOLUTION EPIDURAL; INFILTRATION; INTRACAUDAL; PERINEURAL
Status: COMPLETED | OUTPATIENT
Start: 2019-12-11 | End: 2019-12-11

## 2019-12-11 RX ORDER — ALPRAZOLAM 0.25 MG/1
0.25 TABLET ORAL 3 TIMES DAILY PRN
Qty: 15 TABLET | Refills: 0 | OUTPATIENT
Start: 2019-12-11

## 2019-12-11 RX ADMIN — DEXAMETHASONE SODIUM PHOSPHATE 40 MG: 100 INJECTION INTRAMUSCULAR; INTRAVENOUS at 12:02

## 2019-12-11 RX ADMIN — LIDOCAINE HYDROCHLORIDE 4 ML: 10 INJECTION, SOLUTION EPIDURAL; INFILTRATION; INTRACAUDAL; PERINEURAL at 12:02

## 2019-12-11 NOTE — TELEPHONE ENCOUNTER
----- Message from Elsie Arriola MD sent at 12/11/2019  1:12 AM EST -----  Please ask pt to have orthopedist fax consultt note to us when she goes in to follow-up with ihm to review abnormalities on MRI

## 2019-12-11 NOTE — PROGRESS NOTES
Assessment/Plan:  1  Primary osteoarthritis of left knee     2  Effusion of left knee         Scribe Attestation    I,:   James Zamarripa Call am acting as a scribe while in the presence of the attending physician :        I,:   Grace Crowley MD personally performed the services described in this documentation    as scribed in my presence :          Large joint arthrocentesis: L knee  Date/Time: 12/11/2019 12:02 PM  Consent given by: patient  Site marked: site marked  Timeout: Immediately prior to procedure a time out was called to verify the correct patient, procedure, equipment, support staff and site/side marked as required   Supporting Documentation  Indications: pain   Procedure Details  Location: knee - L knee  Needle size: 22 G  Ultrasound guidance: no  Approach: anterolateral  Medications administered: 4 mL lidocaine (PF) 1 %; 40 mg dexamethasone 100 mg/10 mL    Patient tolerance: patient tolerated the procedure well with no immediate complications  Dressing:  Sterile dressing applied          Tanvi Parker has severe patellofemoral as well as lateral compartment osteoarthritis which is evident on MRI  We discussed treatment options moving forward  She will likely require a knee replacement in the future  For now she was provided a cortisone steroid injection  She deferred aspiration  She tolerated the procedure well  I asked that she give the medication 2 weeks to take effect  If she does not find relief she will contact our office and we will order a series a Euflexxa injections for her  I do recommend that she takes it easy today  She may consider but applying ice as her knee may be sore this evening  I do recommend ice for pain control in regards to the arthritis as well  Subjective:   Temitope Moise is a 51 y o  female who presents to the office today for re-evaluation of her left knee  On last visit an MRI was ordered due to significant amount of pain and swelling that she was experiencing    Tanvi Parker states her knee pain has not improved whatsoever  Her chief complaint is of the persistent moderate ache located in the anterior aspect of the knee that will radiate laterally  Her pain can become sharp and severe with prolonged walking or standing  She notes the persistent clicking sensation in the front of her knee I can be painful  Her swelling has persisted  She states her knee was so swollen at 1 point she could wear georgina jeans  She has attempted to take ibuprofen for pain relief but unfortunately this bothers her stomach  She is only somewhat better with rest   Ariana's occupation requires her to stand for long periods  This will exacerbate her symptoms  Review of Systems   Constitutional: Positive for activity change  Negative for chills, fever and unexpected weight change  HENT: Negative for hearing loss, nosebleeds and sore throat  Eyes: Negative for pain, redness and visual disturbance  Respiratory: Negative for cough, shortness of breath and wheezing  Cardiovascular: Negative for chest pain, palpitations and leg swelling  Gastrointestinal: Negative for abdominal pain, nausea and vomiting  Endocrine: Negative for polydipsia and polyuria  Genitourinary: Negative for dysuria and hematuria  Musculoskeletal:        See HPI   Skin: Negative for rash and wound  Neurological: Negative for dizziness, numbness and headaches  Psychiatric/Behavioral: Negative for decreased concentration and suicidal ideas  The patient is not nervous/anxious            Past Medical History:   Diagnosis Date    Chronic hip pain, left        Past Surgical History:   Procedure Laterality Date    ANKLE ARTHROPLASTY      BREAST LUMPECTOMY      BREAST SURGERY Bilateral     Enlargement    LAPAROSCOPY FOR ECTOPIC PREGNANCY      And Salpingectomy       Family History   Problem Relation Age of Onset    Arthritis Mother     Emphysema Mother     Breast cancer Maternal Aunt     No Known Problems Father  No Known Problems Sister     No Known Problems Brother     No Known Problems Maternal Uncle     No Known Problems Paternal Aunt     No Known Problems Paternal Uncle     No Known Problems Maternal Grandmother     No Known Problems Maternal Grandfather     No Known Problems Paternal Grandmother     No Known Problems Paternal Grandfather        Social History     Occupational History    Not on file   Tobacco Use    Smoking status: Never Smoker    Smokeless tobacco: Never Used    Tobacco comment: Former smoker, per allscripts   Substance and Sexual Activity    Alcohol use: No     Comment: social drinker, per allscripts    Drug use: No    Sexual activity: Not on file         Current Outpatient Medications:     ALPRAZolam (XANAX) 0 25 mg tablet, Take 1 tablet (0 25 mg total) by mouth 3 (three) times a day as needed for anxiety or sleep, Disp: 15 tablet, Rfl: 0    diclofenac sodium (VOLTAREN) 50 mg EC tablet, Take 50 mg by mouth 2 (two) times a day, Disp: , Rfl: 0    DULoxetine (CYMBALTA) 30 mg delayed release capsule, Take 30 mg by mouth daily, Disp: , Rfl:     naproxen (NAPROSYN) 500 mg tablet, Take 1 tablet (500 mg total) by mouth 2 (two) times a day with meals, Disp: 30 tablet, Rfl: 0    naproxen (NAPROSYN) 500 mg tablet, Take 1 tablet (500 mg total) by mouth 2 (two) times a day with meals, Disp: 30 tablet, Rfl: 0    RA ASPIRIN  MG EC tablet, Take 325 mg by mouth daily, Disp: , Rfl: 0    benzonatate (TESSALON) 200 MG capsule, Take 1 capsule (200 mg total) by mouth 3 (three) times a day as needed for cough (Patient not taking: Reported on 11/8/2019), Disp: 20 capsule, Rfl: 0    Allergies   Allergen Reactions    Peach [Prunus Persica] Other (See Comments)     Closure of throat with peaches, apricots,plums , nectarine ,and almonds    Vicodin [Hydrocodone-Acetaminophen] Itching       Objective:  Vitals:    12/11/19 1110   BP: 121/78   Pulse: 76       Left Knee Exam     Tenderness   The patient is experiencing tenderness in the medial joint line, patellar tendon, lateral joint line, medial retinaculum and lateral retinaculum  Range of Motion   Extension: -5 (Painful crepitus)   Flexion: 90 (painful)     Tests   Varus: negative Valgus: negative  Drawer:  Anterior - negative     Posterior - negative    Other   Erythema: absent  Scars: absent  Sensation: normal  Swelling: moderate  Effusion: effusion present          Observations   Left Knee   Positive for effusion  Physical Exam   Constitutional: She is oriented to person, place, and time  She appears well-developed and well-nourished  HENT:   Head: Normocephalic and atraumatic  Eyes: Conjunctivae are normal  Right eye exhibits no discharge  Left eye exhibits no discharge  Neck: Normal range of motion  Neck supple  Cardiovascular: Regular rhythm and intact distal pulses  Pulmonary/Chest: Effort normal  No stridor  No respiratory distress  Musculoskeletal:        Left knee: She exhibits effusion  As noted in HPI   Neurological: She is alert and oriented to person, place, and time  Skin: Skin is warm and dry  Psychiatric: She has a normal mood and affect  Her behavior is normal    Vitals reviewed  I have personally reviewed pertinent films in PACS and my interpretation is as follows:  MRI of the left knee demonstrates full-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

## 2019-12-16 ENCOUNTER — OFFICE VISIT (OUTPATIENT)
Dept: FAMILY MEDICINE CLINIC | Facility: CLINIC | Age: 49
End: 2019-12-16
Payer: COMMERCIAL

## 2019-12-16 VITALS
WEIGHT: 171 LBS | TEMPERATURE: 97.8 F | BODY MASS INDEX: 30.3 KG/M2 | DIASTOLIC BLOOD PRESSURE: 78 MMHG | HEART RATE: 80 BPM | HEIGHT: 63 IN | SYSTOLIC BLOOD PRESSURE: 122 MMHG | RESPIRATION RATE: 14 BRPM

## 2019-12-16 DIAGNOSIS — F32.A ANXIETY AND DEPRESSION: Primary | ICD-10-CM

## 2019-12-16 DIAGNOSIS — F41.9 ANXIETY AND DEPRESSION: Primary | ICD-10-CM

## 2019-12-16 DIAGNOSIS — M54.42 LEFT-SIDED LOW BACK PAIN WITH LEFT-SIDED SCIATICA, UNSPECIFIED CHRONICITY: ICD-10-CM

## 2019-12-16 PROBLEM — R45.89 ANXIETY ABOUT HEALTH: Status: ACTIVE | Noted: 2018-09-01

## 2019-12-16 PROBLEM — F41.8 ANXIETY ABOUT HEALTH: Status: ACTIVE | Noted: 2018-09-01

## 2019-12-16 PROCEDURE — 99213 OFFICE O/P EST LOW 20 MIN: CPT | Performed by: FAMILY MEDICINE

## 2019-12-16 RX ORDER — ALPRAZOLAM 0.25 MG/1
0.25 TABLET ORAL
Qty: 30 TABLET | Refills: 0 | Status: SHIPPED | OUTPATIENT
Start: 2019-12-16 | End: 2020-03-04 | Stop reason: SDUPTHER

## 2019-12-16 RX ORDER — DULOXETIN HYDROCHLORIDE 30 MG/1
30 CAPSULE, DELAYED RELEASE ORAL DAILY
Qty: 30 CAPSULE | Refills: 1 | Status: SHIPPED | OUTPATIENT
Start: 2019-12-16 | End: 2020-04-23 | Stop reason: SDUPTHER

## 2020-01-03 DIAGNOSIS — M17.12 PRIMARY OSTEOARTHRITIS OF LEFT KNEE: Primary | ICD-10-CM

## 2020-01-14 NOTE — PROGRESS NOTES
Chief Complaint   Patient presents with    Medication Refill     aprazolam        Patient ID: Dorita Mrcae is a 51 y o  female  Anxiety   Presents for follow-up visit  Symptoms include decreased concentration, excessive worry, insomnia, irritability, malaise, muscle tension, nervous/anxious behavior and restlessness  Patient reports no suicidal ideas  The severity of symptoms is moderate  The quality of sleep is fair  Compliance with medications is %       Also in to req ref for ortho for follow-up on back issues--is clint w Dr Merlin Bernheim    Past Medical History:   Diagnosis Date    Chronic hip pain, left        Past Surgical History:   Procedure Laterality Date    ANKLE ARTHROPLASTY      BREAST LUMPECTOMY      BREAST SURGERY Bilateral     Enlargement    LAPAROSCOPY FOR ECTOPIC PREGNANCY      And Salpingectomy    TOTAL HIP ARTHROPLASTY Left 09/2018       Patient Active Problem List   Diagnosis    Anxiety about health       Family History   Problem Relation Age of Onset    Arthritis Mother     Emphysema Mother     Breast cancer Maternal Aunt     No Known Problems Father     No Known Problems Sister     No Known Problems Brother     No Known Problems Maternal Uncle     No Known Problems Paternal Aunt     No Known Problems Paternal Uncle     No Known Problems Maternal Grandmother     No Known Problems Maternal Grandfather     No Known Problems Paternal Grandmother     No Known Problems Paternal Grandfather        Immunization History   Administered Date(s) Administered    Influenza Quadrivalent Preservative Free 3 years and older IM 09/12/2017    Influenza, injectable, quadrivalent, preservative free 0 5 mL 10/28/2019    Tdap 05/18/2017       Allergies   Allergen Reactions    Peach [Prunus Persica] Other (See Comments)     Closure of throat with peaches, apricots,plums , nectarine ,and almonds    Vicodin [Hydrocodone-Acetaminophen] Itching       Current Outpatient Medications Medication Sig Dispense Refill    ALPRAZolam (XANAX) 0 25 mg tablet Take 1 tablet (0 25 mg total) by mouth daily at bedtime as needed for anxiety or sleep 30 tablet 0    naproxen (NAPROSYN) 500 mg tablet Take 1 tablet (500 mg total) by mouth 2 (two) times a day with meals 30 tablet 0    benzonatate (TESSALON) 200 MG capsule Take 1 capsule (200 mg total) by mouth 3 (three) times a day as needed for cough (Patient not taking: Reported on 11/8/2019) 20 capsule 0    diclofenac sodium (VOLTAREN) 50 mg EC tablet Take 50 mg by mouth 2 (two) times a day  0    DULoxetine (CYMBALTA) 30 mg delayed release capsule Take 1 capsule (30 mg total) by mouth daily 30 capsule 1    RA ASPIRIN  MG EC tablet Take 325 mg by mouth daily  0     No current facility-administered medications for this visit          Social History     Socioeconomic History    Marital status: Single     Spouse name: None    Number of children: None    Years of education: None    Highest education level: None   Occupational History    None   Social Needs    Financial resource strain: None    Food insecurity:     Worry: None     Inability: None    Transportation needs:     Medical: None     Non-medical: None   Tobacco Use    Smoking status: Never Smoker    Smokeless tobacco: Never Used    Tobacco comment: Former smoker, per allscripts   Substance and Sexual Activity    Alcohol use: No     Comment: social drinker, per allscripts    Drug use: No    Sexual activity: None   Lifestyle    Physical activity:     Days per week: None     Minutes per session: None    Stress: None   Relationships    Social connections:     Talks on phone: None     Gets together: None     Attends Religion service: None     Active member of club or organization: None     Attends meetings of clubs or organizations: None     Relationship status: None    Intimate partner violence:     Fear of current or ex partner: None     Emotionally abused: None     Physically abused: None     Forced sexual activity: None   Other Topics Concern    None   Social History Narrative    Daily caffeinated coffee consumption       Review of Systems   Constitutional: Positive for irritability  Psychiatric/Behavioral: Positive for decreased concentration  Negative for suicidal ideas  The patient is nervous/anxious and has insomnia  Objective:    /78   Pulse 80   Temp 97 8 °F (36 6 °C)   Resp 14   Ht 5' 2 5" (1 588 m)   Wt 77 6 kg (171 lb)   BMI 30 78 kg/m²        Physical Exam   Constitutional: She is oriented to person, place, and time  She appears well-developed and well-nourished  Neck: Neck supple  Cardiovascular: Normal rate and regular rhythm  Pulmonary/Chest: Effort normal and breath sounds normal    Musculoskeletal: She exhibits tenderness (b/l LS paravertebral mm w restricted ROM sec discomfort)  Neurological: She is alert and oriented to person, place, and time  No cranial nerve deficit  Skin: Skin is warm and dry  No rash noted  Psychiatric: She has a normal mood and affect  Nursing note and vitals reviewed  Assessment/Plan:     Diagnoses and all orders for this visit:    Anxiety and depression  Comments:  trial daily Duloxetine w cont prn Alprazolam use-refill given  Orders:  -     DULoxetine (CYMBALTA) 30 mg delayed release capsule; Take 1 capsule (30 mg total) by mouth daily  -     ALPRAZolam (XANAX) 0 25 mg tablet; Take 1 tablet (0 25 mg total) by mouth daily at bedtime as needed for anxiety or sleep    Left-sided low back pain with left-sided sciatica, unspecified chronicity  Comments:  est w ortho-Dr  [facility]--ref given for follow-up  Rx Duloxetine for above plus chronic pain  Orders:  -     Cancel: Ambulatory referral to Orthopedic Surgery; Future  -     DULoxetine (CYMBALTA) 30 mg delayed release capsule; Take 1 capsule (30 mg total) by mouth daily  -     Ambulatory referral to Orthopedic Surgery;  Future    Other orders  - Cancel: Ambulatory referral to Orthopedic Surgery;  Future        Elsie Arriola MD

## 2020-03-04 DIAGNOSIS — F41.9 ANXIETY AND DEPRESSION: ICD-10-CM

## 2020-03-04 DIAGNOSIS — F32.A ANXIETY AND DEPRESSION: ICD-10-CM

## 2020-03-04 RX ORDER — ALPRAZOLAM 0.25 MG/1
0.25 TABLET ORAL
Qty: 30 TABLET | Refills: 0 | Status: SHIPPED | OUTPATIENT
Start: 2020-03-04 | End: 2020-04-23 | Stop reason: SDUPTHER

## 2020-03-04 NOTE — TELEPHONE ENCOUNTER
----- Message from Gena Tejeda sent at 3/4/2020 11:31 AM EST -----  Regarding: RE: Non-Urgent Medical Question  Contact: [phone]  i was can you check my records  ----- Message -----  From: Demarcus Garcia  Sent: 3/4/2020 11:05 AM EST  To: Gena Tejeda  Subject: RE: Non-Urgent Medical Question  YOU HAVE TO BE TYPED & SCREENED FOR THAT, ITS UNKNOWN    ----- Message -----     From: Gena Tejeda     Sent: 3/4/2020 10:38 AM EST       To: Vincent Negron MD  Subject: Non-Urgent Medical Question    WHAT IS MY BLOOD TYPE??

## 2020-03-05 ENCOUNTER — TELEPHONE (OUTPATIENT)
Dept: OBGYN CLINIC | Facility: CLINIC | Age: 50
End: 2020-03-05

## 2020-03-05 ENCOUNTER — TELEPHONE (OUTPATIENT)
Dept: OBGYN CLINIC | Facility: HOSPITAL | Age: 50
End: 2020-03-05

## 2020-03-05 DIAGNOSIS — Z00.00 HEALTHCARE MAINTENANCE: Primary | ICD-10-CM

## 2020-03-05 NOTE — TELEPHONE ENCOUNTER
Needs a verbal order for the Euflexxa L knee, but has conflicting info from patient stating it's the Gel 1 injection      Callback #[phone]  Request to speak to a pharmacist

## 2020-03-05 NOTE — TELEPHONE ENCOUNTER
Can you please call this patient and asked her to specify if it is truly the gel 1 injection or the Euflexxa  Some of these patients have insurance that requires them to receive 1 joint lubricant injection over the other  I am happy to provide whichever 1 the insurance company is requiring  We typically would use Euflexxa as this is better tolerated but will certainly administer the gel 1 injection if this is what the insurance company requires or if the patient prefers that

## 2020-03-05 NOTE — TELEPHONE ENCOUNTER
Spoke to pt  She is going to [employer] company to find out which injection they will cover and call us back to put in the script

## 2020-03-05 NOTE — TELEPHONE ENCOUNTER
Tiki Waldrop To  [facility] Sent  3/5/2020 10:30 AM   I called this morning and they told me they need a new prescription faxed over or called in   fax# is [phone]   or call [phone]         Please see referral notes for documentation of my chart messages concerning  Per referral notes Sydnie Dom from insurance was received for [id number]

## 2020-03-05 NOTE — TELEPHONE ENCOUNTER
Patient reached out via [facility] in regards top injections  Advised that Insurance [facility] was obtained and provided number to [facility]  Please advise  Documentation of Taulia message in referral notes

## 2020-03-06 NOTE — TELEPHONE ENCOUNTER
Thank you I did call [facility] today as well to check status and was informed that it was still being verified through [employer] - I did inform the representative that we have an approval on file until 01/2021

## 2020-03-10 NOTE — TELEPHONE ENCOUNTER
Patient stated that she has spoken with her insurance company and the specialty pharmacy and they are waiting on a script from our office  I advised that it looks like [facility] pharmacy is trying to verify her insurance benefits  Patient stated that her insurance company had approved these injections back in January so she doesn't understand why it is no March and she still hasn't received her medication  She feels like she has just been getting a lot of back and forth when she calls  I advised that I would reach out to our auth team to  Have someone reach out to her with an update

## 2020-03-11 NOTE — TELEPHONE ENCOUNTER
Josu[facility] insurance is still being verified by [employer] - I expressed my concern with them regarding patients pain level and length of time they have had this script to fill since we received the approval with health plan  They said they would send a urgent e-mail to expedite this situation

## 2020-04-23 DIAGNOSIS — F41.9 ANXIETY AND DEPRESSION: ICD-10-CM

## 2020-04-23 DIAGNOSIS — F32.A ANXIETY AND DEPRESSION: ICD-10-CM

## 2020-04-23 DIAGNOSIS — M54.42 LEFT-SIDED LOW BACK PAIN WITH LEFT-SIDED SCIATICA, UNSPECIFIED CHRONICITY: ICD-10-CM

## 2020-04-24 RX ORDER — ALPRAZOLAM 0.25 MG/1
0.25 TABLET ORAL
Qty: 30 TABLET | Refills: 0 | Status: SHIPPED | OUTPATIENT
Start: 2020-04-24 | End: 2020-05-20 | Stop reason: SDUPTHER

## 2020-04-24 RX ORDER — DULOXETIN HYDROCHLORIDE 30 MG/1
30 CAPSULE, DELAYED RELEASE ORAL DAILY
Qty: 30 CAPSULE | Refills: 1 | Status: SHIPPED | OUTPATIENT
Start: 2020-04-24 | End: 2020-12-14 | Stop reason: ALTCHOICE

## 2020-05-08 ENCOUNTER — TELEPHONE (OUTPATIENT)
Dept: OBGYN CLINIC | Facility: CLINIC | Age: 50
End: 2020-05-08

## 2020-05-14 ENCOUNTER — TELEPHONE (OUTPATIENT)
Dept: OBGYN CLINIC | Facility: HOSPITAL | Age: 50
End: 2020-05-14

## 2020-05-15 ENCOUNTER — TELEMEDICINE (OUTPATIENT)
Dept: FAMILY MEDICINE CLINIC | Facility: CLINIC | Age: 50
End: 2020-05-15
Payer: COMMERCIAL

## 2020-05-15 VITALS — WEIGHT: 167 LBS | BODY MASS INDEX: 29.59 KG/M2 | HEIGHT: 63 IN

## 2020-05-15 DIAGNOSIS — F41.9 ANXIETY: Primary | ICD-10-CM

## 2020-05-15 DIAGNOSIS — L23.7 POISON IVY: Primary | ICD-10-CM

## 2020-05-15 PROCEDURE — 99213 OFFICE O/P EST LOW 20 MIN: CPT | Performed by: FAMILY MEDICINE

## 2020-05-15 RX ORDER — PREDNISONE 10 MG/1
10 TABLET ORAL DAILY
Qty: 5 TABLET | Refills: 0 | Status: SHIPPED | OUTPATIENT
Start: 2020-05-15 | End: 2020-05-20 | Stop reason: SDUPTHER

## 2020-05-20 DIAGNOSIS — F41.9 ANXIETY AND DEPRESSION: ICD-10-CM

## 2020-05-20 DIAGNOSIS — L23.7 POISON IVY: ICD-10-CM

## 2020-05-20 DIAGNOSIS — F32.A ANXIETY AND DEPRESSION: ICD-10-CM

## 2020-05-21 ENCOUNTER — TELEPHONE (OUTPATIENT)
Dept: FAMILY MEDICINE CLINIC | Facility: CLINIC | Age: 50
End: 2020-05-21

## 2020-05-21 DIAGNOSIS — L23.7 POISON IVY: ICD-10-CM

## 2020-05-21 RX ORDER — PREDNISONE 10 MG/1
10 TABLET ORAL DAILY
Qty: 5 TABLET | Refills: 0 | Status: SHIPPED | OUTPATIENT
Start: 2020-05-21 | End: 2020-05-21 | Stop reason: SDUPTHER

## 2020-05-21 RX ORDER — ALPRAZOLAM 0.25 MG/1
0.25 TABLET ORAL
Qty: 30 TABLET | Refills: 0 | Status: SHIPPED | OUTPATIENT
Start: 2020-05-21 | End: 2020-07-02 | Stop reason: SDUPTHER

## 2020-05-21 RX ORDER — PREDNISONE 10 MG/1
TABLET ORAL
Qty: 7 TABLET | Refills: 0 | Status: SHIPPED | OUTPATIENT
Start: 2020-05-21 | End: 2020-07-02 | Stop reason: SDUPTHER

## 2020-05-22 ENCOUNTER — OFFICE VISIT (OUTPATIENT)
Dept: OBGYN CLINIC | Facility: CLINIC | Age: 50
End: 2020-05-22
Payer: COMMERCIAL

## 2020-05-22 DIAGNOSIS — M17.12 PRIMARY OSTEOARTHRITIS OF LEFT KNEE: Primary | ICD-10-CM

## 2020-05-22 PROCEDURE — 20610 DRAIN/INJ JOINT/BURSA W/O US: CPT | Performed by: ORTHOPAEDIC SURGERY

## 2020-05-22 RX ORDER — HYALURONATE SODIUM 10 MG/ML
20 SYRINGE (ML) INTRAARTICULAR
Status: COMPLETED | OUTPATIENT
Start: 2020-05-22 | End: 2020-05-22

## 2020-05-22 RX ADMIN — Medication 20 MG: at 08:05

## 2020-05-29 ENCOUNTER — OFFICE VISIT (OUTPATIENT)
Dept: OBGYN CLINIC | Facility: CLINIC | Age: 50
End: 2020-05-29
Payer: COMMERCIAL

## 2020-05-29 VITALS — TEMPERATURE: 99.2 F

## 2020-05-29 DIAGNOSIS — M17.12 PRIMARY OSTEOARTHRITIS OF LEFT KNEE: Primary | ICD-10-CM

## 2020-05-29 PROCEDURE — 20610 DRAIN/INJ JOINT/BURSA W/O US: CPT | Performed by: ORTHOPAEDIC SURGERY

## 2020-05-29 RX ORDER — HYALURONATE SODIUM 10 MG/ML
20 SYRINGE (ML) INTRAARTICULAR
Status: COMPLETED | OUTPATIENT
Start: 2020-05-29 | End: 2020-05-29

## 2020-05-29 RX ADMIN — Medication 20 MG: at 08:09

## 2020-06-05 ENCOUNTER — OFFICE VISIT (OUTPATIENT)
Dept: OBGYN CLINIC | Facility: CLINIC | Age: 50
End: 2020-06-05
Payer: COMMERCIAL

## 2020-06-05 VITALS — TEMPERATURE: 96.5 F

## 2020-06-05 DIAGNOSIS — M17.12 PRIMARY OSTEOARTHRITIS OF LEFT KNEE: Primary | ICD-10-CM

## 2020-06-05 PROCEDURE — 20610 DRAIN/INJ JOINT/BURSA W/O US: CPT | Performed by: ORTHOPAEDIC SURGERY

## 2020-06-05 RX ORDER — HYALURONATE SODIUM 10 MG/ML
20 SYRINGE (ML) INTRAARTICULAR
Status: COMPLETED | OUTPATIENT
Start: 2020-06-05 | End: 2020-06-05

## 2020-06-05 RX ADMIN — Medication 20 MG: at 08:12

## 2020-06-14 PROBLEM — F41.9 ANXIETY: Status: ACTIVE | Noted: 2018-09-01

## 2020-06-22 ENCOUNTER — TELEPHONE (OUTPATIENT)
Dept: OBGYN CLINIC | Facility: CLINIC | Age: 50
End: 2020-06-22

## 2020-06-22 NOTE — TELEPHONE ENCOUNTER
----- Message from Sabas Shelley sent at 6/22/2020  6:26 AM EDT -----  Regarding: Referral Request  Contact: [phone]  So I have completed the 3 gel shots to my knee  Unfortunately I do not feel any different  I am still in pain and experiencing swelling and tightness  Can we schedule a cortisone shot for now and then discuss our next steps?   Sabas Shelley

## 2020-06-24 ENCOUNTER — OFFICE VISIT (OUTPATIENT)
Dept: OBGYN CLINIC | Facility: CLINIC | Age: 50
End: 2020-06-24
Payer: COMMERCIAL

## 2020-06-24 VITALS
TEMPERATURE: 97.1 F | DIASTOLIC BLOOD PRESSURE: 73 MMHG | HEIGHT: 63 IN | BODY MASS INDEX: 29.34 KG/M2 | SYSTOLIC BLOOD PRESSURE: 111 MMHG | HEART RATE: 62 BPM | WEIGHT: 165.6 LBS

## 2020-06-24 DIAGNOSIS — M17.12 PRIMARY OSTEOARTHRITIS OF LEFT KNEE: Primary | ICD-10-CM

## 2020-06-24 DIAGNOSIS — Z96.642 STATUS POST LEFT HIP REPLACEMENT: ICD-10-CM

## 2020-06-24 DIAGNOSIS — M25.552 PAIN IN LEFT HIP: ICD-10-CM

## 2020-06-24 PROCEDURE — 99214 OFFICE O/P EST MOD 30 MIN: CPT | Performed by: ORTHOPAEDIC SURGERY

## 2020-06-24 PROCEDURE — 3008F BODY MASS INDEX DOCD: CPT | Performed by: ORTHOPAEDIC SURGERY

## 2020-06-24 PROCEDURE — 1036F TOBACCO NON-USER: CPT | Performed by: ORTHOPAEDIC SURGERY

## 2020-06-25 ENCOUNTER — OFFICE VISIT (OUTPATIENT)
Dept: OBGYN CLINIC | Facility: CLINIC | Age: 50
End: 2020-06-25
Payer: COMMERCIAL

## 2020-06-25 ENCOUNTER — APPOINTMENT (OUTPATIENT)
Dept: RADIOLOGY | Facility: CLINIC | Age: 50
End: 2020-06-25
Payer: COMMERCIAL

## 2020-06-25 VITALS
BODY MASS INDEX: 29.23 KG/M2 | TEMPERATURE: 97 F | HEART RATE: 66 BPM | DIASTOLIC BLOOD PRESSURE: 75 MMHG | HEIGHT: 63 IN | WEIGHT: 165 LBS | SYSTOLIC BLOOD PRESSURE: 117 MMHG

## 2020-06-25 DIAGNOSIS — M17.12 PRIMARY OSTEOARTHRITIS OF LEFT KNEE: ICD-10-CM

## 2020-06-25 DIAGNOSIS — M17.12 PRIMARY LOCALIZED OSTEOARTHRITIS OF LEFT KNEE: Primary | ICD-10-CM

## 2020-06-25 DIAGNOSIS — G89.29 CHRONIC PAIN OF LEFT KNEE: ICD-10-CM

## 2020-06-25 DIAGNOSIS — M70.72 ILIOPSOAS BURSITIS OF LEFT HIP: Primary | ICD-10-CM

## 2020-06-25 DIAGNOSIS — M25.562 CHRONIC PAIN OF LEFT KNEE: ICD-10-CM

## 2020-06-25 DIAGNOSIS — Z96.642 STATUS POST LEFT HIP REPLACEMENT: ICD-10-CM

## 2020-06-25 DIAGNOSIS — M25.552 PAIN IN LEFT HIP: ICD-10-CM

## 2020-06-25 PROCEDURE — 3008F BODY MASS INDEX DOCD: CPT | Performed by: ORTHOPAEDIC SURGERY

## 2020-06-25 PROCEDURE — 73562 X-RAY EXAM OF KNEE 3: CPT

## 2020-06-25 PROCEDURE — 1036F TOBACCO NON-USER: CPT | Performed by: ORTHOPAEDIC SURGERY

## 2020-06-25 PROCEDURE — 99214 OFFICE O/P EST MOD 30 MIN: CPT | Performed by: ORTHOPAEDIC SURGERY

## 2020-06-26 ENCOUNTER — TELEPHONE (OUTPATIENT)
Dept: OBGYN CLINIC | Facility: CLINIC | Age: 50
End: 2020-06-26

## 2020-07-01 DIAGNOSIS — M25.552 LEFT HIP PAIN: ICD-10-CM

## 2020-07-01 DIAGNOSIS — Z96.642 S/P HIP REPLACEMENT, LEFT: Primary | ICD-10-CM

## 2020-07-02 ENCOUNTER — TELEPHONE (OUTPATIENT)
Dept: OBGYN CLINIC | Facility: CLINIC | Age: 50
End: 2020-07-02

## 2020-07-02 DIAGNOSIS — F32.A ANXIETY AND DEPRESSION: ICD-10-CM

## 2020-07-02 DIAGNOSIS — F41.9 ANXIETY AND DEPRESSION: ICD-10-CM

## 2020-07-02 DIAGNOSIS — L23.7 POISON IVY: ICD-10-CM

## 2020-07-02 NOTE — TELEPHONE ENCOUNTER
MRI had been denied due to: Your records do not show results of a plain x-ray taken after your symptoms started or changed that failed to provide all of the details needed to decide how to treat you       I see you ordered a new Xray- but would you like to try a peer to peer first?

## 2020-07-03 RX ORDER — ALPRAZOLAM 0.25 MG/1
0.25 TABLET ORAL
Qty: 30 TABLET | Refills: 0 | Status: SHIPPED | OUTPATIENT
Start: 2020-07-03 | End: 2020-07-30 | Stop reason: SDUPTHER

## 2020-07-03 RX ORDER — PREDNISONE 10 MG/1
TABLET ORAL
Qty: 7 TABLET | Refills: 0 | Status: SHIPPED | OUTPATIENT
Start: 2020-07-03 | End: 2020-07-30 | Stop reason: SDUPTHER

## 2020-07-06 NOTE — TELEPHONE ENCOUNTER
I contaced [employer] and unfortunately, peer to peer is no longer an option  New X-ray is required at this time, results can be faxed to [phone] Attn: 1st Level Appeal with Case#     I have left a detailed message for Jeremy Johns letting her know a new Xray is required  Actually, Jeremy Johns called while I was in the process of typing note, she is going to go to the [address] in [place] for the Xr today

## 2020-07-07 ENCOUNTER — APPOINTMENT (OUTPATIENT)
Dept: RADIOLOGY | Facility: CLINIC | Age: 50
End: 2020-07-07
Payer: COMMERCIAL

## 2020-07-07 DIAGNOSIS — M25.552 LEFT HIP PAIN: ICD-10-CM

## 2020-07-07 DIAGNOSIS — Z96.642 S/P HIP REPLACEMENT, LEFT: ICD-10-CM

## 2020-07-07 PROCEDURE — 73502 X-RAY EXAM HIP UNI 2-3 VIEWS: CPT

## 2020-07-20 ENCOUNTER — HOSPITAL ENCOUNTER (OUTPATIENT)
Dept: RADIOLOGY | Facility: HOSPITAL | Age: 50
Discharge: HOME | End: 2020-07-20
Attending: ORTHOPAEDIC SURGERY
Payer: COMMERCIAL

## 2020-07-20 DIAGNOSIS — Z96.642 STATUS POST LEFT HIP REPLACEMENT: ICD-10-CM

## 2020-07-20 DIAGNOSIS — M70.72 ILIOPSOAS BURSITIS OF LEFT HIP: ICD-10-CM

## 2020-07-20 PROCEDURE — 73721 MRI JNT OF LWR EXTRE W/O DYE: CPT

## 2020-07-23 ENCOUNTER — OFFICE VISIT (OUTPATIENT)
Dept: OBGYN CLINIC | Facility: CLINIC | Age: 50
End: 2020-07-23
Payer: COMMERCIAL

## 2020-07-23 VITALS
HEIGHT: 63 IN | TEMPERATURE: 96.9 F | BODY MASS INDEX: 29.13 KG/M2 | SYSTOLIC BLOOD PRESSURE: 121 MMHG | DIASTOLIC BLOOD PRESSURE: 80 MMHG | HEART RATE: 90 BPM | WEIGHT: 164.4 LBS

## 2020-07-23 DIAGNOSIS — M25.562 CHRONIC PAIN OF LEFT KNEE: Primary | ICD-10-CM

## 2020-07-23 DIAGNOSIS — Z96.642 S/P HIP REPLACEMENT, LEFT: ICD-10-CM

## 2020-07-23 DIAGNOSIS — G89.29 CHRONIC PAIN OF LEFT KNEE: Primary | ICD-10-CM

## 2020-07-23 DIAGNOSIS — M17.12 PRIMARY LOCALIZED OSTEOARTHROSIS OF THE KNEE, LEFT: ICD-10-CM

## 2020-07-23 DIAGNOSIS — M25.552 LEFT HIP PAIN: ICD-10-CM

## 2020-07-23 PROCEDURE — 3008F BODY MASS INDEX DOCD: CPT | Performed by: ORTHOPAEDIC SURGERY

## 2020-07-23 PROCEDURE — 1036F TOBACCO NON-USER: CPT | Performed by: ORTHOPAEDIC SURGERY

## 2020-07-23 PROCEDURE — 99213 OFFICE O/P EST LOW 20 MIN: CPT | Performed by: ORTHOPAEDIC SURGERY

## 2020-07-23 NOTE — PROGRESS NOTES
Assessment/Plan:  1  Chronic pain of left knee     2  Primary localized osteoarthrosis of the knee, left     3  S/P hip replacement, left     4  Left hip pain       Patient is here for follow-up regarding her chronic left knee issues as well as her left hip complaints  Her MRI of her left hip was reviewed with her and I do not appreciate any muscle damage, intramuscular edema or tendon rupture or bursitis  We discussed her treatment options and unfortunately due to her retroverted cup the only option would be a revision surgery at this point however I recommended pursuing this was significant caution due to her young age and preservation of her acetabular bone stock  I did advise her that if she wishes to pursue revision surgery she should return to her index surgeon for evaluation and continued treatment  Regarding her left knee she is failing intra-articular steroid injections  Again I reviewed her MRI of her left knee and demonstrates focal primary full-thickness loss is in her patella and posterior lateral compartment  She did on the capsule Euflexxa injection previously which did not provide significant relief for her  At this point she has failed Euflexxa and an intra-articular steroid injection from my partner therefore I recommended we transition to a different viscosupplementation series  Due to her effusion and need for aspiration upon initiation of viscosupplementation I recommended that she pursue Synvisc 1 injection series for her left knee  We will start the approval process for this treatment  All of her questions were addressed today  I will see her back to initiate her left knee injection series  Subjective:  Left hip MRI follow-up, left knee follow-up    Patient ID: Mere Mathew is a 51 y o  female  HPI  Patient is here for follow-up of her left hip MRI to evaluate for iliopsoas tendinitis, or bursitis    She states that her activity-related left knee pain has returned and her knee is diffusely tender  She has no report of relief from the steroid injection and my partner provided for her on 6/24/2020  Her pain is still 9/10 on the pain scale  Her MRI was again reviewed with her today  Review of Systems   Constitutional: Positive for activity change  HENT: Negative  Eyes: Negative  Respiratory: Negative  Cardiovascular: Negative  Gastrointestinal: Negative  Endocrine: Negative  Musculoskeletal: Positive for arthralgias  Skin: Negative  Neurological: Negative  Psychiatric/Behavioral: Negative            Past Medical History:   Diagnosis Date    Chronic hip pain, left        Past Surgical History:   Procedure Laterality Date    ANKLE ARTHROPLASTY      BREAST LUMPECTOMY      BREAST SURGERY Bilateral     Enlargement    LAPAROSCOPY FOR ECTOPIC PREGNANCY      And Salpingectomy    TOTAL HIP ARTHROPLASTY Left 09/2018       Family History   Problem Relation Age of Onset    Arthritis Mother     Emphysema Mother     Breast cancer Maternal Aunt     No Known Problems Father     No Known Problems Sister     No Known Problems Brother     No Known Problems Maternal Uncle     No Known Problems Paternal Aunt     No Known Problems Paternal Uncle     No Known Problems Maternal Grandmother     No Known Problems Maternal Grandfather     No Known Problems Paternal Grandmother     No Known Problems Paternal Grandfather        Social History     Occupational History    Not on file   Tobacco Use    Smoking status: Never Smoker    Smokeless tobacco: Never Used    Tobacco comment: Former smoker, per allscripts   Substance and Sexual Activity    Alcohol use: No     Comment: social drinker, per allscripts    Drug use: No    Sexual activity: Not on file         Current Outpatient Medications:     ALPRAZolam (XANAX) 0 25 mg tablet, Take 1 tablet (0 25 mg total) by mouth daily at bedtime as needed for anxiety or sleep, Disp: 30 tablet, Rfl: 0    DULoxetine (CYMBALTA) 30 mg delayed release capsule, Take 1 capsule (30 mg total) by mouth daily, Disp: 30 capsule, Rfl: 1    predniSONE 10 mg tablet, 3 tabs x 1 day, 2 tabs x 1 day, 1 tab x 1 day, 1/2 tab po x 2 days--take w food (Patient not taking: Reported on 7/23/2020), Disp: 7 tablet, Rfl: 0    Allergies   Allergen Reactions    Peach [Prunus Persica] Other (See Comments)     Closure of throat with peaches, apricots,plums , nectarine ,and almonds       Objective:  Vitals:    07/23/20 0844   BP: 121/80   Pulse: 90   Temp: (!) 96 9 °F (36 1 °C)       Body mass index is 29 59 kg/m²  Left Knee Exam     Tenderness   The patient is experiencing tenderness in the patella, medial joint line and lateral joint line  Range of Motion   Extension: 0   Flexion: 120     Tests   Thor:  Medial - negative Lateral - negative  Varus: negative Valgus: negative  Lachman:  Anterior - negative      Drawer:  Anterior - negative     Posterior - negative  Patellar apprehension: negative    Other   Erythema: absent  Scars: absent  Sensation: normal  Pulse: present  Swelling: none  Effusion: no effusion present    Comments:  Crepitance on AROM and PROM-parapatellar  +PFG  No increased warmth of knee  Knee is stable at 0, 30, 90 degrees          Observations   Left Knee   Negative for effusion  Physical Exam   Constitutional: She is oriented to person, place, and time  She appears well-developed  HENT:   Head: Atraumatic  Eyes: EOM are normal    Neck: Neck supple  Cardiovascular: Normal rate  Pulmonary/Chest: Effort normal    Musculoskeletal:        Left knee: She exhibits no effusion  See orthopedic exam   Neurological: She is alert and oriented to person, place, and time  Skin: Skin is warm and dry  Psychiatric: She has a normal mood and affect  Nursing note and vitals reviewed  I have personally reviewed pertinent films in PACS    MRI of the left hip with MAVERICK sequencing demonstrates a left total hip arthroplasty that is without associated joint effusion  No osseous abnormality in the associated implant beds  No sign of loosening  Minimal fluid around the greater troch bursa  There is evidence of 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

## 2020-07-30 DIAGNOSIS — L23.7 POISON IVY: ICD-10-CM

## 2020-07-30 DIAGNOSIS — F41.9 ANXIETY AND DEPRESSION: ICD-10-CM

## 2020-07-30 DIAGNOSIS — F32.A ANXIETY AND DEPRESSION: ICD-10-CM

## 2020-07-31 RX ORDER — ALPRAZOLAM 0.25 MG/1
0.25 TABLET ORAL
Qty: 30 TABLET | Refills: 0 | Status: SHIPPED | OUTPATIENT
Start: 2020-07-31 | End: 2020-08-25 | Stop reason: SDUPTHER

## 2020-07-31 RX ORDER — PREDNISONE 10 MG/1
TABLET ORAL
Qty: 7 TABLET | Refills: 0 | Status: SHIPPED | OUTPATIENT
Start: 2020-07-31 | End: 2020-08-25 | Stop reason: SDUPTHER

## 2020-08-25 DIAGNOSIS — F32.A ANXIETY AND DEPRESSION: ICD-10-CM

## 2020-08-25 DIAGNOSIS — L23.7 POISON IVY: ICD-10-CM

## 2020-08-25 DIAGNOSIS — F41.9 ANXIETY AND DEPRESSION: ICD-10-CM

## 2020-08-26 ENCOUNTER — TELEPHONE (OUTPATIENT)
Dept: OBGYN CLINIC | Facility: CLINIC | Age: 50
End: 2020-08-26

## 2020-08-26 NOTE — TELEPHONE ENCOUNTER
Can we please update patient on injection status I don't see any notes? Thank you!    ----- Message from Mike Ulloa sent at 8/25/2020  9:25 PM EDT -----  Regarding: Non-Urgent Medical Question  Contact: [phone]  Just wondering what is taking so long about my appointment to get my knee drained and gel shots put in  Its getting worse, and I am having a very hard time walking  And is their anything you can do for my hip I know we spoke but that to is getting worse    I am in extreme pain and need help  Mike Ulloa

## 2020-08-27 DIAGNOSIS — M25.552 PAIN IN LEFT HIP: Primary | ICD-10-CM

## 2020-08-27 RX ORDER — ALPRAZOLAM 0.25 MG/1
0.25 TABLET ORAL
Qty: 30 TABLET | Refills: 0 | Status: SHIPPED | OUTPATIENT
Start: 2020-08-27 | End: 2020-10-01 | Stop reason: SDUPTHER

## 2020-08-27 RX ORDER — PREDNISONE 10 MG/1
TABLET ORAL
Qty: 7 TABLET | Refills: 0 | Status: SHIPPED | OUTPATIENT
Start: 2020-08-27 | End: 2020-10-01 | Stop reason: SDUPTHER

## 2020-09-14 NOTE — TELEPHONE ENCOUNTER
Thanks for the info  She will have to wait until December for repeat gel injections then  She is not eligible for a cortisone injection until after 9/24

## 2020-09-14 NOTE — TELEPHONE ENCOUNTER
Patient given above information via voicemail  Asked her to call back to schedule cortisone after 9/24

## 2020-10-01 DIAGNOSIS — L23.7 POISON IVY: ICD-10-CM

## 2020-10-01 DIAGNOSIS — F32.A ANXIETY AND DEPRESSION: ICD-10-CM

## 2020-10-01 DIAGNOSIS — F41.9 ANXIETY AND DEPRESSION: ICD-10-CM

## 2020-10-03 RX ORDER — ALPRAZOLAM 0.25 MG/1
0.25 TABLET ORAL
Qty: 30 TABLET | Refills: 0 | Status: SHIPPED | OUTPATIENT
Start: 2020-10-03 | End: 2020-10-29 | Stop reason: SDUPTHER

## 2020-10-03 RX ORDER — PREDNISONE 10 MG/1
TABLET ORAL
Qty: 7 TABLET | Refills: 0 | Status: SHIPPED | OUTPATIENT
Start: 2020-10-03 | End: 2020-12-14 | Stop reason: ALTCHOICE

## 2020-10-29 DIAGNOSIS — F41.9 ANXIETY AND DEPRESSION: ICD-10-CM

## 2020-10-29 DIAGNOSIS — F32.A ANXIETY AND DEPRESSION: ICD-10-CM

## 2020-10-30 DIAGNOSIS — F41.9 ANXIETY AND DEPRESSION: ICD-10-CM

## 2020-10-30 DIAGNOSIS — F32.A ANXIETY AND DEPRESSION: ICD-10-CM

## 2020-10-30 RX ORDER — ALPRAZOLAM 0.25 MG/1
0.25 TABLET ORAL
Qty: 30 TABLET | Refills: 0 | Status: SHIPPED | OUTPATIENT
Start: 2020-10-30 | End: 2020-12-14 | Stop reason: ALTCHOICE

## 2020-10-30 RX ORDER — ALPRAZOLAM 0.25 MG/1
0.25 TABLET ORAL
Qty: 30 TABLET | Refills: 0 | OUTPATIENT
Start: 2020-10-30

## 2020-11-02 ENCOUNTER — CONSULT (OUTPATIENT)
Dept: PAIN MEDICINE | Facility: CLINIC | Age: 50
End: 2020-11-02
Payer: COMMERCIAL

## 2020-11-02 VITALS
TEMPERATURE: 98.7 F | HEIGHT: 63 IN | WEIGHT: 164 LBS | HEART RATE: 81 BPM | BODY MASS INDEX: 29.06 KG/M2 | SYSTOLIC BLOOD PRESSURE: 116 MMHG | DIASTOLIC BLOOD PRESSURE: 84 MMHG

## 2020-11-02 DIAGNOSIS — G89.4 CHRONIC PAIN SYNDROME: Primary | ICD-10-CM

## 2020-11-02 DIAGNOSIS — M25.552 PAIN IN LEFT HIP: ICD-10-CM

## 2020-11-02 DIAGNOSIS — Z96.642 S/P HIP REPLACEMENT, LEFT: ICD-10-CM

## 2020-11-02 PROCEDURE — 99244 OFF/OP CNSLTJ NEW/EST MOD 40: CPT | Performed by: ANESTHESIOLOGY

## 2020-11-02 RX ORDER — SULINDAC 200 MG/1
200 TABLET ORAL 2 TIMES DAILY PRN
Qty: 30 TABLET | Refills: 0 | Status: SHIPPED | OUTPATIENT
Start: 2020-11-02 | End: 2020-12-03 | Stop reason: ALTCHOICE

## 2020-12-03 ENCOUNTER — OFFICE VISIT (OUTPATIENT)
Dept: PAIN MEDICINE | Facility: CLINIC | Age: 50
End: 2020-12-03
Payer: COMMERCIAL

## 2020-12-03 ENCOUNTER — TELEPHONE (OUTPATIENT)
Dept: FAMILY MEDICINE CLINIC | Facility: CLINIC | Age: 50
End: 2020-12-03

## 2020-12-03 VITALS
SYSTOLIC BLOOD PRESSURE: 112 MMHG | WEIGHT: 168.8 LBS | HEIGHT: 63 IN | BODY MASS INDEX: 29.91 KG/M2 | DIASTOLIC BLOOD PRESSURE: 79 MMHG | HEART RATE: 89 BPM

## 2020-12-03 DIAGNOSIS — Z96.642 S/P HIP REPLACEMENT, LEFT: ICD-10-CM

## 2020-12-03 DIAGNOSIS — F11.20 UNCOMPLICATED OPIOID DEPENDENCE (HCC): ICD-10-CM

## 2020-12-03 DIAGNOSIS — G89.4 CHRONIC PAIN SYNDROME: Primary | ICD-10-CM

## 2020-12-03 DIAGNOSIS — M25.552 PAIN IN LEFT HIP: ICD-10-CM

## 2020-12-03 DIAGNOSIS — Z79.891 LONG-TERM CURRENT USE OF OPIATE ANALGESIC: ICD-10-CM

## 2020-12-03 PROCEDURE — 99214 OFFICE O/P EST MOD 30 MIN: CPT | Performed by: ANESTHESIOLOGY

## 2020-12-03 PROCEDURE — 80305 DRUG TEST PRSMV DIR OPT OBS: CPT | Performed by: ANESTHESIOLOGY

## 2020-12-03 RX ORDER — NALOXONE HYDROCHLORIDE 4 MG/.1ML
SPRAY NASAL
Qty: 1 EACH | Refills: 1 | Status: SHIPPED | OUTPATIENT
Start: 2020-12-03 | End: 2021-12-17 | Stop reason: HOSPADM

## 2020-12-03 RX ORDER — HYDROCODONE BITARTRATE AND ACETAMINOPHEN 5; 325 MG/1; MG/1
1 TABLET ORAL 2 TIMES DAILY PRN
Qty: 60 TABLET | Refills: 0 | Status: SHIPPED | OUTPATIENT
Start: 2020-12-03 | End: 2020-12-31 | Stop reason: ALTCHOICE

## 2020-12-05 LAB
6MAM UR QL CFM: NEGATIVE NG/ML
7AMINOCLONAZEPAM UR QL CFM: NEGATIVE NG/ML
A-OH ALPRAZ UR QL CFM: NEGATIVE NG/ML
AMPHET UR QL CFM: NEGATIVE NG/ML
AMPHET UR QL CFM: NEGATIVE NG/ML
BUPRENORPHINE UR QL CFM: NEGATIVE NG/ML
BUTALBITAL UR QL CFM: NEGATIVE NG/ML
BZE UR QL CFM: NEGATIVE NG/ML
CODEINE UR QL CFM: NEGATIVE NG/ML
DESIPRAMINE UR QL CFM: NEGATIVE NG/ML
DESIPRAMINE UR QL CFM: NEGATIVE NG/ML
EDDP UR QL CFM: NEGATIVE NG/ML
ETHYL GLUCURONIDE UR QL CFM: NEGATIVE NG/ML
ETHYL SULFATE UR QL SCN: NEGATIVE NG/ML
FENTANYL UR QL CFM: NEGATIVE NG/ML
GLIADIN IGG SER IA-ACNC: NEGATIVE NG/ML
GLUCOSE 30M P 50 G LAC PO SERPL-MCNC: NEGATIVE NG/ML
HYDROCODONE UR QL CFM: NEGATIVE NG/ML
HYDROCODONE UR QL CFM: NEGATIVE NG/ML
HYDROMORPHONE UR QL CFM: NEGATIVE NG/ML
IMIPRAMINE UR QL CFM: NEGATIVE NG/ML
LORAZEPAM UR QL CFM: NEGATIVE NG/ML
MDMA UR QL CFM: NEGATIVE NG/ML
ME-PHENIDATE UR QL CFM: NEGATIVE NG/ML
MEPERIDINE UR QL CFM: NEGATIVE NG/ML
MEPHEDRONE UR QL CFM: NEGATIVE NG/ML
METHADONE UR QL CFM: NEGATIVE NG/ML
METHAMPHET UR QL CFM: NEGATIVE NG/ML
MORPHINE UR QL CFM: NEGATIVE NG/ML
MORPHINE UR QL CFM: NEGATIVE NG/ML
NORBUPRENORPHINE UR QL CFM: NEGATIVE NG/ML
NORDIAZEPAM UR QL CFM: NEGATIVE NG/ML
NORFENTANYL UR QL CFM: NEGATIVE NG/ML
NORHYDROCODONE UR QL CFM: NEGATIVE NG/ML
NORHYDROCODONE UR QL CFM: NEGATIVE NG/ML
NORMEPERIDINE UR QL CFM: NEGATIVE NG/ML
NOROXYCODONE UR QL CFM: NEGATIVE NG/ML
OPC-3373 QUANTIFICATION: NEGATIVE
OXAZEPAM UR QL CFM: NEGATIVE NG/ML
OXYCODONE UR QL CFM: NEGATIVE NG/ML
OXYMORPHONE UR QL CFM: NEGATIVE NG/ML
OXYMORPHONE UR QL CFM: NEGATIVE NG/ML
PCP UR QL CFM: NEGATIVE NG/ML
PHENOBARB UR QL CFM: NEGATIVE NG/ML
RESULT ALL_PRESCRIBED MEDS AND SPECIAL INSTRUCTIONS: NORMAL
SECOBARBITAL UR QL CFM: NEGATIVE NG/ML
SL AMB 3-METHYL-FENTANYL QUANTIFICATION: NORMAL NG/ML
SL AMB 4-ANPP QUANTIFICATION: NORMAL NG/ML
SL AMB 4-FIBF QUANTIFICATION: NORMAL NG/ML
SL AMB 5F-ADB-M7 METABOLITE QUANTIFICATION: NEGATIVE NG/ML
SL AMB 7-OH-MITRAGYNINE (KRATOM ALKALOID) QUANTIFICATION: NEGATIVE NG/ML
SL AMB AB-FUBINACA-M3 METABOLITE QUANTIFICATION: NEGATIVE NG/ML
SL AMB ACETYL FENTANYL QUANTIFICATION: NORMAL NG/ML
SL AMB ACETYL NORFENTANYL QUANTIFICATION: NORMAL NG/ML
SL AMB ACRYL FENTANYL QUANTIFICATION: NORMAL NG/ML
SL AMB BATH SALTS: NEGATIVE NG/ML
SL AMB BUTRYL FENTANYL QUANTIFICATION: NORMAL NG/ML
SL AMB CARFENTANIL QUANTIFICATION: NORMAL NG/ML
SL AMB CLOZAPINE QUANTIFICATION: NEGATIVE NG/ML
SL AMB CTHC (MARIJUANA METABOLITE) QUANTIFICATION: NEGATIVE NG/ML
SL AMB CYCLOPROPYL FENTANYL QUANTIFICATION: NORMAL NG/ML
SL AMB DEXTROMETHORPHAN QUANTIFICATION: NEGATIVE NG/ML
SL AMB DEXTRORPHAN (DEXTROMETHORPHAN METABOLITE) QUANT: NEGATIVE NG/ML
SL AMB DEXTRORPHAN (DEXTROMETHORPHAN METABOLITE) QUANT: NEGATIVE NG/ML
SL AMB FURANYL FENTANYL QUANTIFICATION: NORMAL NG/ML
SL AMB HALOPERIDOL  QUANTIFICATION: NEGATIVE NG/ML
SL AMB HALOPERIDOL METABOLITE QUANTIFICATION: NEGATIVE NG/ML
SL AMB JWH018 METABOLITE QUANTIFICATION: NEGATIVE NG/ML
SL AMB JWH073 METABOLITE QUANTIFICATION: NEGATIVE NG/ML
SL AMB MDMB-FUBINACA-M1 METABOLITE QUANTIFICATION: NEGATIVE NG/ML
SL AMB METHOXYACETYL FENTANYL QUANTIFICATION: NORMAL NG/ML
SL AMB METHYLONE QUANTIFICATION: NEGATIVE NG/ML
SL AMB N-DESMETHYL U-47700 QUANTIFICATION: NORMAL NG/ML
SL AMB N-DESMETHYL-TRAMADOL QUANTIFICATION: NEGATIVE NG/ML
SL AMB N-DESMETHYLCLOZAPINE QUANTIFICATION: NEGATIVE NG/ML
SL AMB PHENTERMINE QUANTIFICATION: NEGATIVE NG/ML
SL AMB RCS4 METABOLITE QUANTIFICATION: NEGATIVE NG/ML
SL AMB RITALINIC ACID QUANTIFICATION: NEGATIVE NG/ML
SL AMB U-47700 QUANTIFICATION: NORMAL NG/ML
SPECIMEN DRAWN SERPL: NEGATIVE NG/ML
TAPENTADOL UR QL CFM: NEGATIVE NG/ML
TEMAZEPAM UR QL CFM: NEGATIVE NG/ML
TEMAZEPAM UR QL CFM: NEGATIVE NG/ML
TRAMADOL UR QL CFM: NEGATIVE NG/ML
URATE/CREAT 24H UR: NEGATIVE NG/ML

## 2020-12-07 ENCOUNTER — DOCUMENTATION (OUTPATIENT)
Dept: OBGYN CLINIC | Facility: HOSPITAL | Age: 50
End: 2020-12-07

## 2020-12-09 ENCOUNTER — TELEPHONE (OUTPATIENT)
Dept: OBGYN CLINIC | Facility: CLINIC | Age: 50
End: 2020-12-09

## 2020-12-11 ENCOUNTER — TELEPHONE (OUTPATIENT)
Dept: OBGYN CLINIC | Facility: HOSPITAL | Age: 50
End: 2020-12-11

## 2020-12-14 ENCOUNTER — APPOINTMENT (OUTPATIENT)
Dept: RADIOLOGY | Facility: CLINIC | Age: 50
End: 2020-12-14
Payer: COMMERCIAL

## 2020-12-14 ENCOUNTER — OFFICE VISIT (OUTPATIENT)
Dept: FAMILY MEDICINE CLINIC | Facility: CLINIC | Age: 50
End: 2020-12-14
Payer: COMMERCIAL

## 2020-12-14 VITALS
WEIGHT: 168.4 LBS | SYSTOLIC BLOOD PRESSURE: 118 MMHG | BODY MASS INDEX: 29.84 KG/M2 | HEIGHT: 63 IN | DIASTOLIC BLOOD PRESSURE: 84 MMHG | HEART RATE: 88 BPM | TEMPERATURE: 98.2 F

## 2020-12-14 DIAGNOSIS — M79.673 FOOT ARCH PAIN, UNSPECIFIED LATERALITY: ICD-10-CM

## 2020-12-14 DIAGNOSIS — M79.671 HEEL PAIN, BILATERAL: ICD-10-CM

## 2020-12-14 DIAGNOSIS — M79.672 HEEL PAIN, BILATERAL: ICD-10-CM

## 2020-12-14 DIAGNOSIS — Z12.31 ENCOUNTER FOR SCREENING MAMMOGRAM FOR BREAST CANCER: ICD-10-CM

## 2020-12-14 DIAGNOSIS — G89.4 CHRONIC PAIN SYNDROME: ICD-10-CM

## 2020-12-14 DIAGNOSIS — R73.09 ABNORMAL GLUCOSE: ICD-10-CM

## 2020-12-14 DIAGNOSIS — F41.9 ANXIETY AND DEPRESSION: Primary | ICD-10-CM

## 2020-12-14 DIAGNOSIS — F32.A ANXIETY AND DEPRESSION: Primary | ICD-10-CM

## 2020-12-14 DIAGNOSIS — R53.83 FATIGUE, UNSPECIFIED TYPE: ICD-10-CM

## 2020-12-14 PROCEDURE — 99214 OFFICE O/P EST MOD 30 MIN: CPT | Performed by: FAMILY MEDICINE

## 2020-12-14 PROCEDURE — 73630 X-RAY EXAM OF FOOT: CPT

## 2020-12-14 RX ORDER — ESCITALOPRAM OXALATE 10 MG/1
10 TABLET ORAL DAILY
Qty: 30 TABLET | Refills: 1 | Status: SHIPPED | OUTPATIENT
Start: 2020-12-14 | End: 2021-03-06 | Stop reason: SDUPTHER

## 2020-12-15 ENCOUNTER — TELEPHONE (OUTPATIENT)
Dept: FAMILY MEDICINE CLINIC | Facility: CLINIC | Age: 50
End: 2020-12-15

## 2020-12-20 PROBLEM — M79.671 PAIN IN BOTH FEET: Status: ACTIVE | Noted: 2020-10-29

## 2020-12-20 PROBLEM — M79.671 HEEL PAIN, BILATERAL: Status: ACTIVE | Noted: 2020-12-20

## 2020-12-20 PROBLEM — R53.83 FATIGUE: Status: ACTIVE | Noted: 2020-12-20

## 2020-12-20 PROBLEM — M79.672 PAIN IN BOTH FEET: Status: ACTIVE | Noted: 2020-10-29

## 2020-12-20 PROBLEM — F41.9 ANXIETY AND DEPRESSION: Status: ACTIVE | Noted: 2020-12-20

## 2020-12-20 PROBLEM — F32.A ANXIETY AND DEPRESSION: Status: ACTIVE | Noted: 2020-12-20

## 2020-12-20 PROBLEM — R73.09 ABNORMAL GLUCOSE: Status: ACTIVE | Noted: 2020-12-20

## 2020-12-20 PROBLEM — M79.673 FOOT ARCH PAIN: Status: ACTIVE | Noted: 2020-12-20

## 2020-12-20 PROBLEM — Z12.31 ENCOUNTER FOR SCREENING MAMMOGRAM FOR BREAST CANCER: Status: ACTIVE | Noted: 2020-12-20

## 2020-12-20 PROBLEM — M79.672 HEEL PAIN, BILATERAL: Status: ACTIVE | Noted: 2020-12-20

## 2020-12-31 ENCOUNTER — OFFICE VISIT (OUTPATIENT)
Dept: PAIN MEDICINE | Facility: CLINIC | Age: 50
End: 2020-12-31
Payer: COMMERCIAL

## 2020-12-31 VITALS
TEMPERATURE: 97.6 F | HEART RATE: 78 BPM | DIASTOLIC BLOOD PRESSURE: 84 MMHG | BODY MASS INDEX: 29.77 KG/M2 | SYSTOLIC BLOOD PRESSURE: 120 MMHG | WEIGHT: 168 LBS | HEIGHT: 63 IN

## 2020-12-31 DIAGNOSIS — M25.552 PAIN IN LEFT HIP: ICD-10-CM

## 2020-12-31 DIAGNOSIS — Z96.642 S/P HIP REPLACEMENT, LEFT: ICD-10-CM

## 2020-12-31 DIAGNOSIS — G89.4 CHRONIC PAIN SYNDROME: Primary | ICD-10-CM

## 2020-12-31 PROCEDURE — 99214 OFFICE O/P EST MOD 30 MIN: CPT | Performed by: ANESTHESIOLOGY

## 2020-12-31 RX ORDER — HYDROCODONE BITARTRATE AND ACETAMINOPHEN 7.5; 325 MG/1; MG/1
1 TABLET ORAL 2 TIMES DAILY PRN
Qty: 60 TABLET | Refills: 0 | Status: SHIPPED | OUTPATIENT
Start: 2021-01-02 | End: 2021-01-28 | Stop reason: SDUPTHER

## 2021-01-08 ENCOUNTER — TELEPHONE (OUTPATIENT)
Dept: FAMILY MEDICINE CLINIC | Facility: CLINIC | Age: 51
End: 2021-01-08

## 2021-01-08 NOTE — TELEPHONE ENCOUNTER
Spoke with patient  Gave her both Dr Mack Suárez and [address] # to call to schedule appointment

## 2021-01-08 NOTE — TELEPHONE ENCOUNTER
[facility] - see if pt scheduled with a podiatrist yet , we recommend Dr Edil Balbuena or [facility] in [place] [facility]/[facility]

## 2021-01-15 ENCOUNTER — OFFICE VISIT (OUTPATIENT)
Dept: OBGYN CLINIC | Facility: CLINIC | Age: 51
End: 2021-01-15
Payer: COMMERCIAL

## 2021-01-15 DIAGNOSIS — M25.562 CHRONIC PAIN OF LEFT KNEE: ICD-10-CM

## 2021-01-15 DIAGNOSIS — G89.29 CHRONIC PAIN OF LEFT KNEE: ICD-10-CM

## 2021-01-15 DIAGNOSIS — M25.462 EFFUSION OF LEFT KNEE: ICD-10-CM

## 2021-01-15 DIAGNOSIS — M17.12 PRIMARY LOCALIZED OSTEOARTHROSIS OF THE KNEE, LEFT: Primary | ICD-10-CM

## 2021-01-15 PROCEDURE — 20610 DRAIN/INJ JOINT/BURSA W/O US: CPT | Performed by: ORTHOPAEDIC SURGERY

## 2021-01-15 NOTE — PROGRESS NOTES
Assessment/Plan:  1  Primary localized osteoarthrosis of the knee, left  Large joint arthrocentesis   2  Effusion of left knee  Large joint arthrocentesis   3  Chronic pain of left knee  Large joint arthrocentesis     Blaise Prince is a pleasant 53 old presenting today follow-up her activity related left knee pain and swelling due to osteoarthritis  Unfortunately she has failed conservative treatment to this point  After discussing risks and benefits, she consented to and underwent a left knee aspiration and injection Synvisc-One, she tolerated well without difficulty or complication  Post injection instructions were provided  We will plan to see her back in 6 months pending the efficacy of today's procedure  She expressed understanding all of her questions were addressed today    Large joint arthrocentesis: L knee  Universal Protocol:  Consent: Verbal consent obtained  Risks and benefits: risks, benefits and alternatives were discussed  Consent given by: patient  Time out: Immediately prior to procedure a "time out" was called to verify the correct patient, procedure, equipment, support staff and site/side marked as required  Timeout called at: 1/15/2021 11:56 AM   Site marked: the operative site was marked  Patient identity confirmed: verbally with patient    Supporting Documentation  Indications: pain and joint swelling   Procedure Details  Location: knee - L knee  Preparation: Patient was prepped and draped in the usual sterile fashion  Needle size: 20 G  Ultrasound guidance: no  Approach: anterolateral  Medications administered: 48 mg hylan 48 MG/6ML    Aspirate amount: 8 mL  Aspirate: yellow, serous and clear    Patient tolerance: patient tolerated the procedure well with no immediate complications  Dressing:  Sterile dressing applied        Subjective: Left knee Synvisc-One    Patient ID: Yuriy Arthur is a 53 y o  female      Blaise Prince is a pleasant 42-year-old female presenting today for follow-up of her activity related left knee pain due to her underlying osteoarthritis  Since her last appointment in July, she has continued to have left knee and hip pain  She has been taking hydrocodone as directed by pain management  However, her knee pain continues to be a significant source of discomfort and limits her ability to perform activities of daily living  She denies any new injuries    Review of Systems   Constitutional: Positive for activity change  HENT: Negative  Eyes: Negative  Respiratory: Negative  Cardiovascular: Negative  Gastrointestinal: Negative  Endocrine: Negative  Genitourinary: Negative  Musculoskeletal: Positive for arthralgias, gait problem, joint swelling and myalgias  Skin: Negative  Allergic/Immunologic: Negative  Hematological: Negative  Psychiatric/Behavioral: Negative        Past Medical History:   Diagnosis Date    Chronic hip pain, left        Past Surgical History:   Procedure Laterality Date    ANKLE ARTHROPLASTY      BREAST LUMPECTOMY      BREAST SURGERY Bilateral     Enlargement    LAPAROSCOPY FOR ECTOPIC PREGNANCY      And Salpingectomy    TOTAL HIP ARTHROPLASTY Left 09/2018       Family History   Problem Relation Age of Onset    Arthritis Mother     Emphysema Mother     Breast cancer Maternal Aunt     No Known Problems Father     No Known Problems Sister     No Known Problems Brother     No Known Problems Maternal Uncle     No Known Problems Paternal Aunt     No Known Problems Paternal Uncle     No Known Problems Maternal Grandmother     No Known Problems Maternal Grandfather     No Known Problems Paternal Grandmother     No Known Problems Paternal Grandfather        Social History     Occupational History    Not on file   Tobacco Use    Smoking status: Never Smoker    Smokeless tobacco: Never Used    Tobacco comment: Former smoker, per allscripts   Substance and Sexual Activity    Alcohol use: No     Comment: social drinker, per allscripts    Drug use: No    Sexual activity: Not on file         Current Outpatient Medications:     escitalopram (LEXAPRO) 10 mg tablet, Take 1 tablet (10 mg total) by mouth daily, Disp: 30 tablet, Rfl: 1    HYDROcodone-acetaminophen (NORCO) 7 5-325 mg per tablet, Take 1 tablet by mouth 2 (two) times a day as needed for painMax Daily Amount: 2 tablets, Disp: 60 tablet, Rfl: 0    naloxone (NARCAN) 4 mg/0 1 mL nasal spray, Administer 1 spray into a nostril  If no response after 2-3 minutes, give another dose in the other nostril using a new spray , Disp: 1 each, Rfl: 1    Allergies   Allergen Reactions    Peach [Prunus Persica] Other (See Comments)     Closure of throat with peaches, apricots,plums , nectarine ,and almonds       Objective: There were no vitals filed for this visit  There is no height or weight on file to calculate BMI  Left Knee Exam     Muscle Strength   The patient has normal left knee strength  Tenderness   The patient is experiencing tenderness in the patella, medial joint line and lateral joint line  Range of Motion   Extension:  0 normal   Flexion:  120 normal     Tests   Thor:  Medial - negative Lateral - negative  Varus: negative Valgus: negative  Drawer:  Anterior - negative     Posterior - negative  Patellar apprehension: negative    Other   Erythema: absent  Scars: absent  Sensation: normal  Pulse: present  Swelling: none  Effusion: no effusion present    Comments:  Crepitance on AROM and PROM-parapatellar  +PFG  No increased warmth of knee  Knee is stable at 0, 30, 90 degrees  Ambulates with antalgic gait on the left knee use of single-point cane          Observations   Left Knee   Negative for effusion  Physical Exam  Vitals signs and nursing note reviewed  Constitutional:       Appearance: She is well-developed  Comments: There is no height or weight on file to calculate BMI  HENT:      Head: Normocephalic and atraumatic        Right Ear: External ear normal       Left Ear: External ear normal    Eyes:      Extraocular Movements: Extraocular movements intact  Neck:      Musculoskeletal: Normal range of motion  Cardiovascular:      Rate and Rhythm: Normal rate  Pulmonary:      Effort: Pulmonary effort is normal    Abdominal:      Palpations: Abdomen is soft  Musculoskeletal:      Left knee: She exhibits no effusion  Comments: See ortho exam   Skin:     General: Skin is warm and dry  Neurological:      Mental Status: She is alert and oriented to person, place, and time  Psychiatric:         Mood and Affect: Mood normal          Behavior: Behavior normal          Thought Content:  Thought content normal          Judgment: Judgment normal

## 2021-01-18 ENCOUNTER — OFFICE VISIT (OUTPATIENT)
Dept: PODIATRY | Facility: CLINIC | Age: 51
End: 2021-01-18
Payer: COMMERCIAL

## 2021-01-18 VITALS
BODY MASS INDEX: 29.77 KG/M2 | RESPIRATION RATE: 17 BRPM | SYSTOLIC BLOOD PRESSURE: 126 MMHG | HEIGHT: 63 IN | WEIGHT: 168 LBS | DIASTOLIC BLOOD PRESSURE: 78 MMHG

## 2021-01-18 DIAGNOSIS — M21.961 ACQUIRED DEFORMITY OF RIGHT FOOT: Primary | ICD-10-CM

## 2021-01-18 DIAGNOSIS — M72.2 PLANTAR FASCIITIS: ICD-10-CM

## 2021-01-18 DIAGNOSIS — M21.41 ACQUIRED FLAT FOOT, RIGHT: ICD-10-CM

## 2021-01-18 DIAGNOSIS — M21.42 ACQUIRED FLAT FOOT, LEFT: ICD-10-CM

## 2021-01-18 DIAGNOSIS — M21.962 ACQUIRED DEFORMITY OF LEFT FOOT: ICD-10-CM

## 2021-01-18 PROCEDURE — L3020 FOOT LONGITUD/METATARSAL SUP: HCPCS | Performed by: PODIATRIST

## 2021-01-18 PROCEDURE — 99243 OFF/OP CNSLTJ NEW/EST LOW 30: CPT | Performed by: PODIATRIST

## 2021-01-18 RX ORDER — MELOXICAM 7.5 MG/1
7.5 TABLET ORAL DAILY
Qty: 10 TABLET | Refills: 0 | Status: SHIPPED | OUTPATIENT
Start: 2021-01-18 | End: 2021-01-28 | Stop reason: SDUPTHER

## 2021-01-18 RX ORDER — GABAPENTIN 100 MG/1
100 CAPSULE ORAL 3 TIMES DAILY
Qty: 90 CAPSULE | Refills: 0 | Status: SHIPPED | OUTPATIENT
Start: 2021-01-18 | End: 2021-01-28 | Stop reason: SDUPTHER

## 2021-01-18 NOTE — PROGRESS NOTES
Assessment/Plan:  Plantar fasciitis bilateral   Acquired deformity of foot  Acquired pes planus  Pronation syndrome  Rule out inflammatory arthropathy or Lyme disease    Plan  Foot exam performed  Patient educated on condition  X-rays reviewed  Patient be started on both gabapentin and Mobic  She will follow-up pain management  Her feet have been casted for custom molded foot orthotics  Blood work ordered to rule out inflammatory arthropathy         Diagnoses and all orders for this visit:    Acquired deformity of right foot  -     gabapentin (NEURONTIN) 100 mg capsule; Take 1 capsule (100 mg total) by mouth 3 (three) times a day  -     meloxicam (MOBIC) 7 5 mg tablet; Take 1 tablet (7 5 mg total) by mouth daily for 10 days    Acquired deformity of left foot  -     gabapentin (NEURONTIN) 100 mg capsule; Take 1 capsule (100 mg total) by mouth 3 (three) times a day  -     meloxicam (MOBIC) 7 5 mg tablet; Take 1 tablet (7 5 mg total) by mouth daily for 10 days  -     X-ray ankle right 3+ views; Future    Acquired flat foot, right    Acquired flat foot, left  -     X-ray ankle right 3+ views; Future    Plantar fasciitis  -     meloxicam (MOBIC) 7 5 mg tablet; Take 1 tablet (7 5 mg total) by mouth daily for 10 days  -     X-ray ankle right 3+ views; Future          Subjective:  Patient has pain  She has pain in her feet and legs and back  She has history of multiple surgeries  Her biggest complaint about her feet is pain in the arches      Allergies   Allergen Reactions    Peach [Prunus Persica] Other (See Comments)     Closure of throat with peaches, apricots,plums , nectarine ,and almonds         Current Outpatient Medications:     escitalopram (LEXAPRO) 10 mg tablet, Take 1 tablet (10 mg total) by mouth daily, Disp: 30 tablet, Rfl: 1    gabapentin (NEURONTIN) 100 mg capsule, Take 1 capsule (100 mg total) by mouth 3 (three) times a day, Disp: 90 capsule, Rfl: 0    HYDROcodone-acetaminophen ([address]) 7 5-325 mg per tablet, Take 1 tablet by mouth 2 (two) times a day as needed for painMax Daily Amount: 2 tablets, Disp: 60 tablet, Rfl: 0    meloxicam (MOBIC) 7 5 mg tablet, Take 1 tablet (7 5 mg total) by mouth daily for 10 days, Disp: 10 tablet, Rfl: 0    naloxone (NARCAN) 4 mg/0 1 mL nasal spray, Administer 1 spray into a nostril  If no response after 2-3 minutes, give another dose in the other nostril using a new spray , Disp: 1 each, Rfl: 1    Patient Active Problem List   Diagnosis    Anxiety    Poison ivy    S/P hip replacement, left    Chronic pain syndrome    Pain in left hip    Pain in both feet    Anxiety and depression    Heel pain, bilateral    Foot arch pain    Encounter for screening mammogram for breast cancer    Fatigue    Abnormal glucose    BMI 30 0-30 9,adult          Patient ID: Heike Carroll is a 53 y o  female  HPI    The following portions of the patient's history were reviewed and updated as appropriate:     family history includes Arthritis in her mother; Breast cancer in her maternal aunt; Emphysema in her mother; No Known Problems in her brother, father, maternal grandfather, maternal grandmother, maternal uncle, paternal aunt, paternal grandfather, paternal grandmother, paternal uncle, and sister  reports that she has never smoked  She has never used smokeless tobacco  She reports that she does not drink alcohol or use drugs  Vitals:    01/18/21 1303   BP: 126/78   Resp: 17       Review of Systems      Objective:  Patient's shoes and socks removed     Foot Exam    General  General Appearance: appears stated age and healthy   Orientation: alert and oriented to person, place, and time   Affect: appropriate   Gait: antalgic       Right Foot/Ankle     Inspection and Palpation  Ecchymosis: none  Tenderness: calcaneus tenderness, bony tenderness and plantar fascia   Swelling: dorsum   Arch: pes planus    Neurovascular  Dorsalis pedis: 3+  Posterior tibial: 3+  Saphenous nerve sensation: normal  Tibial nerve sensation: normal  Superficial peroneal nerve sensation: normal  Deep peroneal nerve sensation: normal  Sural nerve sensation: normal      Left Foot/Ankle      Inspection and Palpation  Ecchymosis: none  Tenderness: bony tenderness, plantar fascia and calcaneus tenderness   Arch: pes planus    Neurovascular  Dorsalis pedis: 3+  Posterior tibial: 3+  Saphenous nerve sensation: normal  Tibial nerve sensation: normal  Superficial peroneal nerve sensation: normal  Deep peroneal nerve sensation: normal  Sural nerve sensation: normal        Physical Exam  Vitals signs and nursing note reviewed  Constitutional:       Appearance: Normal appearance  Cardiovascular:      Pulses:           Dorsalis pedis pulses are 3+ on the right side and 3+ on the left side  Posterior tibial pulses are 3+ on the right side and 3+ on the left side  Musculoskeletal:      Right foot: Bony tenderness present  Left foot: Bony tenderness present  Comments: Patient is pronated in stance and gait  She has complete collapse of the medial arch  There is pain with palpation plantar fascia bilateral   She has equines deformity right greater than left   Neurological:      Mental Status: She is alert  Psychiatric:         Mood and Affect: Mood normal          Behavior: Behavior normal          Thought Content:  Thought content normal          Judgment: Judgment normal

## 2021-01-19 LAB
ALBUMIN SERPL-MCNC: 4.5 G/DL (ref 3.8–4.8)
ALBUMIN/GLOB SERPL: 1.7 {RATIO} (ref 1.2–2.2)
ALP SERPL-CCNC: 60 IU/L (ref 39–117)
ALT SERPL-CCNC: 12 IU/L (ref 0–32)
AMYLASE SERPL-CCNC: 52 U/L (ref 31–110)
AST SERPL-CCNC: 14 IU/L (ref 0–40)
BILIRUB SERPL-MCNC: 0.3 MG/DL (ref 0–1.2)
BUN SERPL-MCNC: 14 MG/DL (ref 6–24)
BUN/CREAT SERPL: 18 (ref 9–23)
CALCIUM SERPL-MCNC: 9.2 MG/DL (ref 8.7–10.2)
CHLORIDE SERPL-SCNC: 100 MMOL/L (ref 96–106)
CHOLEST SERPL-MCNC: 217 MG/DL (ref 100–199)
CO2 SERPL-SCNC: 23 MMOL/L (ref 20–29)
CREAT SERPL-MCNC: 0.77 MG/DL (ref 0.57–1)
ERYTHROCYTE [DISTWIDTH] IN BLOOD BY AUTOMATED COUNT: 12 % (ref 11.7–15.4)
EST. AVERAGE GLUCOSE BLD GHB EST-MCNC: 108 MG/DL
GLOBULIN SER-MCNC: 2.6 G/DL (ref 1.5–4.5)
GLUCOSE SERPL-MCNC: 87 MG/DL (ref 65–99)
HBA1C MFR BLD: 5.4 % (ref 4.8–5.6)
HCT VFR BLD AUTO: 37.5 % (ref 34–46.6)
HDLC SERPL-MCNC: 99 MG/DL
HGB BLD-MCNC: 12.8 G/DL (ref 11.1–15.9)
LDLC SERPL CALC-MCNC: 104 MG/DL (ref 0–99)
LDLC/HDLC SERPL: 1.1 RATIO (ref 0–3.2)
LIPASE SERPL-CCNC: 45 U/L (ref 14–72)
MAGNESIUM SERPL-MCNC: 2.4 MG/DL (ref 1.6–2.3)
MCH RBC QN AUTO: 30.5 PG (ref 26.6–33)
MCHC RBC AUTO-ENTMCNC: 34.1 G/DL (ref 31.5–35.7)
MCV RBC AUTO: 89 FL (ref 79–97)
MICRODELETION SYND BLD/T FISH: NORMAL
PLATELET # BLD AUTO: 318 X10E3/UL (ref 150–450)
POTASSIUM SERPL-SCNC: 4.3 MMOL/L (ref 3.5–5.2)
PROT SERPL-MCNC: 7.1 G/DL (ref 6–8.5)
RBC # BLD AUTO: 4.2 X10E6/UL (ref 3.77–5.28)
SL AMB EGFR AFRICAN AMERICAN: 105 ML/MIN/1.73
SL AMB EGFR NON AFRICAN AMERICAN: 91 ML/MIN/1.73
SL AMB VLDL CHOLESTEROL CALC: 14 MG/DL (ref 5–40)
SODIUM SERPL-SCNC: 139 MMOL/L (ref 134–144)
TRIGL SERPL-MCNC: 81 MG/DL (ref 0–149)
TSH SERPL DL<=0.005 MIU/L-ACNC: 3.93 UIU/ML (ref 0.45–4.5)
WBC # BLD AUTO: 4.6 X10E3/UL (ref 3.4–10.8)

## 2021-01-20 ENCOUNTER — TELEPHONE (OUTPATIENT)
Dept: FAMILY MEDICINE CLINIC | Facility: CLINIC | Age: 51
End: 2021-01-20

## 2021-01-20 NOTE — TELEPHONE ENCOUNTER
----- Message from Alpa Yang MD sent at 1/19/2021  7:12 PM EST -----  Please inform labs overall look good

## 2021-01-21 LAB
ANA SER QL: NEGATIVE
B BURGDOR IGG+IGM SER-ACNC: <0.91 ISR (ref 0–0.9)
B BURGDOR IGM SER IA-ACNC: <0.8 INDEX (ref 0–0.79)
ERYTHROCYTE [SEDIMENTATION RATE] IN BLOOD BY WESTERGREN METHOD: 10 MM/HR (ref 0–32)
RHEUMATOID FACT SERPL-ACNC: <10 IU/ML (ref 0–13.9)

## 2021-01-22 ENCOUNTER — APPOINTMENT (OUTPATIENT)
Dept: RADIOLOGY | Facility: CLINIC | Age: 51
End: 2021-01-22
Payer: COMMERCIAL

## 2021-01-22 DIAGNOSIS — M21.42 ACQUIRED FLAT FOOT, LEFT: ICD-10-CM

## 2021-01-22 DIAGNOSIS — M21.962 ACQUIRED DEFORMITY OF LEFT FOOT: ICD-10-CM

## 2021-01-22 DIAGNOSIS — M72.2 PLANTAR FASCIITIS: ICD-10-CM

## 2021-01-22 PROCEDURE — 73610 X-RAY EXAM OF ANKLE: CPT

## 2021-01-27 ENCOUNTER — TELEPHONE (OUTPATIENT)
Dept: PODIATRY | Facility: CLINIC | Age: 51
End: 2021-01-27

## 2021-01-28 ENCOUNTER — OFFICE VISIT (OUTPATIENT)
Dept: PAIN MEDICINE | Facility: CLINIC | Age: 51
End: 2021-01-28
Payer: COMMERCIAL

## 2021-01-28 ENCOUNTER — OFFICE VISIT (OUTPATIENT)
Dept: PODIATRY | Facility: CLINIC | Age: 51
End: 2021-01-28
Payer: COMMERCIAL

## 2021-01-28 VITALS
WEIGHT: 167.8 LBS | BODY MASS INDEX: 29.73 KG/M2 | SYSTOLIC BLOOD PRESSURE: 142 MMHG | HEART RATE: 74 BPM | TEMPERATURE: 97.9 F | DIASTOLIC BLOOD PRESSURE: 86 MMHG | HEIGHT: 63 IN

## 2021-01-28 VITALS
HEART RATE: 78 BPM | RESPIRATION RATE: 17 BRPM | HEIGHT: 63 IN | DIASTOLIC BLOOD PRESSURE: 82 MMHG | WEIGHT: 167 LBS | BODY MASS INDEX: 29.59 KG/M2 | SYSTOLIC BLOOD PRESSURE: 135 MMHG

## 2021-01-28 DIAGNOSIS — M72.2 PLANTAR FASCIITIS: ICD-10-CM

## 2021-01-28 DIAGNOSIS — M21.961 ACQUIRED DEFORMITY OF RIGHT FOOT: Primary | ICD-10-CM

## 2021-01-28 DIAGNOSIS — Z96.642 S/P HIP REPLACEMENT, LEFT: ICD-10-CM

## 2021-01-28 DIAGNOSIS — M21.962 ACQUIRED DEFORMITY OF LEFT FOOT: ICD-10-CM

## 2021-01-28 DIAGNOSIS — G89.4 CHRONIC PAIN SYNDROME: Primary | ICD-10-CM

## 2021-01-28 DIAGNOSIS — M25.552 PAIN IN LEFT HIP: ICD-10-CM

## 2021-01-28 PROCEDURE — 99213 OFFICE O/P EST LOW 20 MIN: CPT | Performed by: PODIATRIST

## 2021-01-28 PROCEDURE — 99214 OFFICE O/P EST MOD 30 MIN: CPT | Performed by: ANESTHESIOLOGY

## 2021-01-28 RX ORDER — MELOXICAM 7.5 MG/1
7.5 TABLET ORAL DAILY
Qty: 10 TABLET | Refills: 0 | Status: SHIPPED | OUTPATIENT
Start: 2021-01-28 | End: 2021-03-09 | Stop reason: SDUPTHER

## 2021-01-28 RX ORDER — HYDROCODONE BITARTRATE AND ACETAMINOPHEN 7.5; 325 MG/1; MG/1
1 TABLET ORAL 2 TIMES DAILY PRN
Qty: 60 TABLET | Refills: 0 | Status: SHIPPED | OUTPATIENT
Start: 2021-02-01 | End: 2021-03-29 | Stop reason: SDUPTHER

## 2021-01-28 RX ORDER — GABAPENTIN 100 MG/1
100 CAPSULE ORAL 3 TIMES DAILY
Qty: 90 CAPSULE | Refills: 0 | Status: SHIPPED | OUTPATIENT
Start: 2021-01-28 | End: 2021-03-09 | Stop reason: SDUPTHER

## 2021-01-28 RX ORDER — HYDROCODONE BITARTRATE AND ACETAMINOPHEN 7.5; 325 MG/1; MG/1
1 TABLET ORAL 2 TIMES DAILY PRN
Qty: 60 TABLET | Refills: 0 | Status: SHIPPED | OUTPATIENT
Start: 2021-03-03 | End: 2021-03-29 | Stop reason: SDUPTHER

## 2021-01-28 NOTE — PROGRESS NOTES
Pain Medicine Follow-Up Note    Assessment:  1  Chronic pain syndrome    2  Pain in left hip    3  S/P hip replacement, left        Plan:  New Medications Ordered This Visit   Medications    HYDROcodone-acetaminophen (NORCO) 7 5-325 mg per tablet     Sig: Take 1 tablet by mouth 2 (two) times a day as needed for painMax Daily Amount: 2 tablets     Dispense:  60 tablet     Refill:  0    HYDROcodone-acetaminophen (NORCO) 7 5-325 mg per tablet     Sig: Take 1 tablet by mouth 2 (two) times a day as needed for painMax Daily Amount: 2 tablets     Dispense:  60 tablet     Refill:  0     My impressions and treatment recommendations were discussed in detail with the patient who verbalized understanding and had no further questions  Given that the patient has primarily left hip pain and considering that she is doing very well on hydrocodone/acetaminophen 7 5/325 mg 1 tablet up to twice daily as needed for pain, I felt it reasonable to continue her on this medication at this time  I sent E prescriptions to the pharmacy dated February 1, 2021 and March 3, 2021  The risks and side effects of chronic opioid treatment were discussed in detail with the patient  Side effects include but are not limited to nausea, vomiting, GI intolerance, sedation, constipation, mental clouding, opioid-induced hyperalgesia, endocrine dysfunction, addiction, dependence, and tolerance  The patient was asked to take his medications only as prescribed and directed, never in excess, and never for any other reason other than for pain control  The patient was also asked to keep his medications out of the reach of others and away from children, preferably in a locked drawer  The patient verbalized understanding and wished to use these opioid medications      [place] Prescription Drug Monitoring Program report was reviewed and was appropriate      She is also reporting that she had a injection in her low back from another physician in the past that helped her significantly  She states that this is wearing off and she would like to undergo another injection  She does not have any records of this injection with her at today's visit, but I did ask her to forward us this injection so we could correlate care  The patient verbalized understanding  Follow-up is planned in 2 months time or sooner as warranted  Discharge instructions were provided  I personally saw and examined the patient and I agree with the above discussed plan of care  History of Present Illness:    Rj Enriquez is a 53 y o  female who presents to [facility] for interval re-evaluation of the above stated pain complaints  The patient has a past medical and chronic pain history as outlined in the assessment section  She was last seen on  December 31, 2020  At today's office visit, the patient's pain score is 6/10 on the verbal numerical pain rating scale  The patient states that her pain is primarily in her left hip  She describes her pain as worse in the morning, evening, and night  Her pain is constant in nature  She reports the quality of her pain as burning, dull/ aching, sharp, and shooting  The paitient is reporting that her symptoms are well controlled currently with the hydrocodone/acetaminophen 7 5/325 mg 1 tablet up to twice daily as needed for pain  Other than as stated above, the patient denies any interval changes in medications, medical condition, mental condition, symptoms, or allergies since the last office visit  Review of Systems:    Review of Systems   Respiratory: Negative for shortness of breath  Cardiovascular: Negative for chest pain  Gastrointestinal: Negative for constipation, diarrhea, nausea and vomiting  Musculoskeletal: Positive for back pain and gait problem  Negative for arthralgias, joint swelling and myalgias  Decreased ROM  Joint stiffness   Skin: Negative for rash     Neurological: Negative for dizziness, seizures and weakness  All other systems reviewed and are negative          Patient Active Problem List   Diagnosis    Anxiety    Poison ivy    S/P hip replacement, left    Chronic pain syndrome    Pain in left hip    Pain in both feet    Anxiety and depression    Heel pain, bilateral    Foot arch pain    Encounter for screening mammogram for breast cancer    Fatigue    Abnormal glucose    BMI 30 0-30 9,adult       Past Medical History:   Diagnosis Date    Chronic hip pain, left        Past Surgical History:   Procedure Laterality Date    ANKLE ARTHROPLASTY      BREAST LUMPECTOMY      BREAST SURGERY Bilateral     Enlargement    LAPAROSCOPY FOR ECTOPIC PREGNANCY      And Salpingectomy    TOTAL HIP ARTHROPLASTY Left 09/2018       Family History   Problem Relation Age of Onset    Arthritis Mother     Emphysema Mother     Breast cancer Maternal Aunt     No Known Problems Father     No Known Problems Sister     No Known Problems Brother     No Known Problems Maternal Uncle     No Known Problems Paternal Aunt     No Known Problems Paternal Uncle     No Known Problems Maternal Grandmother     No Known Problems Maternal Grandfather     No Known Problems Paternal Grandmother     No Known Problems Paternal Grandfather        Social History     Occupational History    Not on file   Tobacco Use    Smoking status: Never Smoker    Smokeless tobacco: Never Used    Tobacco comment: Former smoker, per [employer]scripts   Substance and Sexual Activity    Alcohol use: No     Comment: social drinker, per allscripts    Drug use: No    Sexual activity: Not on file         Current Outpatient Medications:     escitalopram (LEXAPRO) 10 mg tablet, Take 1 tablet (10 mg total) by mouth daily, Disp: 30 tablet, Rfl: 1    gabapentin (NEURONTIN) 100 mg capsule, Take 1 capsule (100 mg total) by mouth 3 (three) times a day, Disp: 90 capsule, Rfl: 0    [START ON 2/1/2021] HYDROcodone-acetaminophen (NORCO) 7 5-325 mg per tablet, Take 1 tablet by mouth 2 (two) times a day as needed for painMax Daily Amount: 2 tablets, Disp: 60 tablet, Rfl: 0    meloxicam (MOBIC) 7 5 mg tablet, Take 1 tablet (7 5 mg total) by mouth daily for 10 days, Disp: 10 tablet, Rfl: 0    naloxone (NARCAN) 4 mg/0 1 mL nasal spray, Administer 1 spray into a nostril  If no response after 2-3 minutes, give another dose in the other nostril using a new spray , Disp: 1 each, Rfl: 1    [START ON 3/3/2021] HYDROcodone-acetaminophen (NORCO) 7 5-325 mg per tablet, Take 1 tablet by mouth 2 (two) times a day as needed for painMax Daily Amount: 2 tablets, Disp: 60 tablet, Rfl: 0    Allergies   Allergen Reactions    Peach [Prunus Persica] Other (See Comments)     Closure of throat with peaches, apricots,plums , nectarine ,and almonds       Physical Exam:    /86   Pulse 74   Temp 97 9 °F (36 6 °C)   Ht 5' 2 5" (1 588 m)   Wt 76 1 kg (167 lb 12 8 oz)   BMI 30 20 kg/m²     Constitutional:normal, well developed, well nourished, alert, in no distress and non-toxic and no overt pain behavior    Eyes:anicteric  HEENT:grossly intact  Neck:supple, symmetric, trachea midline and no masses   Pulmonary:even and unlabored  Cardiovascular:No edema or pitting edema present  Skin:Normal without rashes or lesions and well hydrated  Psychiatric:Mood and affect appropriate  Neurologic:Cranial Nerves II-XII grossly intact  Musculoskeletal:normal

## 2021-01-28 NOTE — PROGRESS NOTES
Assessment/Plan: plantar fasciitis bilateral   Acquired deformity of foot  Retained hardware  Rule out radiculopathy  Plan  Foot exam performed  X-rays reviewed  Hardware is not causing patient's pain at this time  She has pes planus and secondary plantar fasciitis  She has radiculopathy  We will start physical therapy  She will use orthotics  She will remain on both Mobic and gabapentin         Diagnoses and all orders for this visit:    Acquired deformity of right foot  -     gabapentin (NEURONTIN) 100 mg capsule; Take 1 capsule (100 mg total) by mouth 3 (three) times a day  -     meloxicam (MOBIC) 7 5 mg tablet; Take 1 tablet (7 5 mg total) by mouth daily for 10 days    Acquired deformity of left foot  -     gabapentin (NEURONTIN) 100 mg capsule; Take 1 capsule (100 mg total) by mouth 3 (three) times a day  -     meloxicam (MOBIC) 7 5 mg tablet; Take 1 tablet (7 5 mg total) by mouth daily for 10 days    Plantar fasciitis  -     meloxicam (MOBIC) 7 5 mg tablet; Take 1 tablet (7 5 mg total) by mouth daily for 10 days  -     Ambulatory referral to Physical Therapy;  Future          Subjective:  Patient is doing better on present medication regimen    Allergies   Allergen Reactions    Peach [Prunus Persica] Other (See Comments)     Closure of throat with peaches, apricots,plums , nectarine ,and almonds         Current Outpatient Medications:     escitalopram (LEXAPRO) 10 mg tablet, Take 1 tablet (10 mg total) by mouth daily, Disp: 30 tablet, Rfl: 1    gabapentin (NEURONTIN) 100 mg capsule, Take 1 capsule (100 mg total) by mouth 3 (three) times a day, Disp: 90 capsule, Rfl: 0    [START ON 2/1/2021] HYDROcodone-acetaminophen (NORCO) 7 5-325 mg per tablet, Take 1 tablet by mouth 2 (two) times a day as needed for painMax Daily Amount: 2 tablets, Disp: 60 tablet, Rfl: 0    [START ON 3/3/2021] HYDROcodone-acetaminophen (NORCO) 7 5-325 mg per tablet, Take 1 tablet by mouth 2 (two) times a day as needed for painMax Daily Amount: 2 tablets, Disp: 60 tablet, Rfl: 0    meloxicam (MOBIC) 7 5 mg tablet, Take 1 tablet (7 5 mg total) by mouth daily for 10 days, Disp: 10 tablet, Rfl: 0    naloxone (NARCAN) 4 mg/0 1 mL nasal spray, Administer 1 spray into a nostril  If no response after 2-3 minutes, give another dose in the other nostril using a new spray , Disp: 1 each, Rfl: 1    Patient Active Problem List   Diagnosis    Anxiety    Poison ivy    S/P hip replacement, left    Chronic pain syndrome    Pain in left hip    Pain in both feet    Anxiety and depression    Heel pain, bilateral    Foot arch pain    Encounter for screening mammogram for breast cancer    Fatigue    Abnormal glucose    BMI 30 0-30 9,adult          Patient ID: Mike Reich is a 53 y o  female  HPI    The following portions of the patient's history were reviewed and updated as appropriate:     family history includes Arthritis in her mother; Breast cancer in her maternal aunt; Emphysema in her mother; No Known Problems in her brother, father, maternal grandfather, maternal grandmother, maternal uncle, paternal aunt, paternal grandfather, paternal grandmother, paternal uncle, and sister  reports that she has never smoked  She has never used smokeless tobacco  She reports that she does not drink alcohol or use drugs  Vitals:    01/28/21 1119   BP: 135/82   Pulse: 78   Resp: 17       Review of Systems      Objective:  Patient's shoes and socks removed     Foot ExamPhysical Exam    General  General Appearance: appears stated age and healthy   Orientation: alert and oriented to person, place, and time   Affect: appropriate   Gait: antalgic         Right Foot/Ankle      Inspection and Palpation  Ecchymosis: none  Tenderness: calcaneus tenderness, bony tenderness and plantar fascia   Swelling: dorsum   Arch: pes planus     Neurovascular  Dorsalis pedis: 3+  Posterior tibial: 3+  Saphenous nerve sensation: normal  Tibial nerve sensation: normal  Superficial peroneal nerve sensation: normal  Deep peroneal nerve sensation: normal  Sural nerve sensation: normal        Left Foot/Ankle       Inspection and Palpation  Ecchymosis: none  Tenderness: bony tenderness, plantar fascia and calcaneus tenderness   Arch: pes planus     Neurovascular  Dorsalis pedis: 3+  Posterior tibial: 3+  Saphenous nerve sensation: normal  Tibial nerve sensation: normal  Superficial peroneal nerve sensation: normal  Deep peroneal nerve sensation: normal  Sural nerve sensation: normal           Physical Exam  Vitals signs and nursing note reviewed  Constitutional:       Appearance: Normal appearance  Cardiovascular:      Pulses:           Dorsalis pedis pulses are 3+ on the right side and 3+ on the left side  Posterior tibial pulses are 3+ on the right side and 3+ on the left side  Musculoskeletal:      Right foot: Bony tenderness present  Left foot: Bony tenderness present  Comments: Patient is pronated in stance and gait  She has complete collapse of the medial arch  There is pain with palpation plantar fascia bilateral   She has equines deformity right greater than left   Neurological:      Mental Status: She is alert  Psychiatric:         Mood and Affect: Mood normal          Behavior: Behavior normal          Thought Content:  Thought content normal          Judgment: Judgment normal

## 2021-02-16 ENCOUNTER — TELEPHONE (OUTPATIENT)
Dept: PAIN MEDICINE | Facility: CLINIC | Age: 51
End: 2021-02-16

## 2021-02-16 NOTE — TELEPHONE ENCOUNTER
Left message for the patient to call to schedule her left L5 TFESI njection    Give my direct phone number [phone]

## 2021-02-16 NOTE — TELEPHONE ENCOUNTER
----- Message from Lyn Argueta RN sent at 2/12/2021  8:10 AM EST -----  Regarding: FW: Non-Urgent Medical Question  Contact: [phone]    ----- Message -----  From: Frnack Payne  Sent: 2/12/2021   7:59 AM EST  To: [facility]  Subject: Non-Urgent Medical Question                      Just checking in on the shot for my back  I received a notice from my insurance that was also sent to you that I was denied was there any way for you to show them all my history of my chronic pain and the information from Dr Randee Jha also doing this procedure   The pain is getting worse and I am just trying to get some relief

## 2021-02-16 NOTE — TELEPHONE ENCOUNTER
Scheduled patient for 3/5/21  Patient denies RX blood thinners/ NSAIDS  Nothing to eat or drink 1 hour prior to procedure  Needs to arrange transportation  Proper clothing for procedure  No vaccines 2 weeks prior or after procedure  If ill or place on antibiotics, please call to reschedule    COVID test scheduled for 2/27/21

## 2021-02-27 DIAGNOSIS — Z11.59 SPECIAL SCREENING EXAMINATION FOR UNSPECIFIED VIRAL DISEASE: ICD-10-CM

## 2021-02-27 PROCEDURE — U0003 INFECTIOUS AGENT DETECTION BY NUCLEIC ACID (DNA OR RNA); SEVERE ACUTE RESPIRATORY SYNDROME CORONAVIRUS 2 (SARS-COV-2) (CORONAVIRUS DISEASE [COVID-19]), AMPLIFIED PROBE TECHNIQUE, MAKING USE OF HIGH THROUGHPUT TECHNOLOGIES AS DESCRIBED BY CMS-2020-01-R: HCPCS

## 2021-02-27 PROCEDURE — U0005 INFEC AGEN DETEC AMPLI PROBE: HCPCS

## 2021-02-28 LAB — SARS-COV-2 RNA RESP QL NAA+PROBE: NEGATIVE

## 2021-03-01 DIAGNOSIS — M72.2 PLANTAR FASCIITIS: ICD-10-CM

## 2021-03-01 DIAGNOSIS — M21.961 ACQUIRED DEFORMITY OF RIGHT FOOT: ICD-10-CM

## 2021-03-01 DIAGNOSIS — M21.962 ACQUIRED DEFORMITY OF LEFT FOOT: ICD-10-CM

## 2021-03-01 RX ORDER — MELOXICAM 7.5 MG/1
7.5 TABLET ORAL DAILY
Qty: 10 TABLET | Refills: 0 | Status: CANCELLED | OUTPATIENT
Start: 2021-03-01 | End: 2021-03-11

## 2021-03-01 RX ORDER — GABAPENTIN 100 MG/1
100 CAPSULE ORAL 3 TIMES DAILY
Qty: 90 CAPSULE | Refills: 0 | Status: CANCELLED | OUTPATIENT
Start: 2021-03-01 | End: 2021-03-31

## 2021-03-05 ENCOUNTER — HOSPITAL ENCOUNTER (OUTPATIENT)
Facility: AMBULARY SURGERY CENTER | Age: 51
Setting detail: OUTPATIENT SURGERY
Discharge: HOME | End: 2021-03-05
Attending: ANESTHESIOLOGY | Admitting: ANESTHESIOLOGY
Payer: COMMERCIAL

## 2021-03-05 ENCOUNTER — APPOINTMENT (OUTPATIENT)
Dept: RADIOLOGY | Facility: HOSPITAL | Age: 51
End: 2021-03-05
Payer: COMMERCIAL

## 2021-03-05 VITALS
OXYGEN SATURATION: 99 % | DIASTOLIC BLOOD PRESSURE: 91 MMHG | RESPIRATION RATE: 14 BRPM | TEMPERATURE: 97.3 F | SYSTOLIC BLOOD PRESSURE: 138 MMHG | HEART RATE: 66 BPM

## 2021-03-05 PROCEDURE — 64483 NJX AA&/STRD TFRM EPI L/S 1: CPT | Performed by: ANESTHESIOLOGY

## 2021-03-05 PROCEDURE — 72020 X-RAY EXAM OF SPINE 1 VIEW: CPT

## 2021-03-05 RX ORDER — BUPIVACAINE HYDROCHLORIDE 2.5 MG/ML
INJECTION, SOLUTION EPIDURAL; INFILTRATION; INTRACAUDAL AS NEEDED
Status: DISCONTINUED | OUTPATIENT
Start: 2021-03-05 | End: 2021-03-05 | Stop reason: HOSPADM

## 2021-03-05 RX ORDER — LIDOCAINE WITH 8.4% SOD BICARB 0.9%(10ML)
SYRINGE (ML) INJECTION AS NEEDED
Status: DISCONTINUED | OUTPATIENT
Start: 2021-03-05 | End: 2021-03-05 | Stop reason: HOSPADM

## 2021-03-05 RX ORDER — METHYLPREDNISOLONE ACETATE 80 MG/ML
INJECTION, SUSPENSION INTRA-ARTICULAR; INTRALESIONAL; INTRAMUSCULAR; SOFT TISSUE AS NEEDED
Status: DISCONTINUED | OUTPATIENT
Start: 2021-03-05 | End: 2021-03-05 | Stop reason: HOSPADM

## 2021-03-05 NOTE — DISCHARGE INSTRUCTIONS
Epidural Steroid Injection   WHAT YOU NEED TO KNOW:   An epidural steroid injection (RYAN) is a procedure to inject steroid medicine into the epidural space  The epidural space is between your spinal cord and vertebrae  Steroids reduce inflammation and fluid buildup in your spine that may be causing pain  You may be given pain medicine along with the steroids  ACTIVITY  · Do not drive or operate machinery today  · No strenuous activity today - bending, lifting, etc   · You may resume normal activites starting tomorrow - start slowly and as tolerated  · You may shower today, but no tub baths or hot tubs  · You may have numbness for several hours from the local anesthetic  Please use caution and common sense, especially with weight-bearing activities  CARE OF THE INJECTION SITE  · If you have soreness or pain, apply ice to the area today (20 minutes on/20 minutes off)  · Starting tomorrow, you may use warm, moist heat or ice if needed  · You may have an increase or change in your discomfort for 36-48 hours after your treatment  · Apply ice and continue with any pain medication you have been prescribed  · Notify the [facility] if you have any of the following: redness, drainage, swelling, headache, stiff neck or fever above 100°F     SPECIAL INSTRUCTIONS  · Our office will contact you in approximately 7 days for a progress report  MEDICATIONS  · Continue to take all routine medications  · Our office may have instructed you to hold some medications  If you have a problem specifically related to your procedure, please call our office at [phone]  Problems not related to your procedure should be directed to your primary care physician

## 2021-03-05 NOTE — H&P
History of Present Illness: The patient is a 53 y o  female who presents with complaints of   Low back pain and left lower extremity radiculopathy  Patient Active Problem List   Diagnosis    Anxiety    Poison ivy    S/P hip replacement, left    Chronic pain syndrome    Pain in left hip    Pain in both feet    Anxiety and depression    Heel pain, bilateral    Foot arch pain    Encounter for screening mammogram for breast cancer    Fatigue    Abnormal glucose    BMI 30 0-30 9,adult       Past Medical History:   Diagnosis Date    Chronic hip pain, left        Past Surgical History:   Procedure Laterality Date    ANKLE ARTHROPLASTY      BREAST LUMPECTOMY      BREAST SURGERY Bilateral     Enlargement    LAPAROSCOPY FOR ECTOPIC PREGNANCY      And Salpingectomy    TOTAL HIP ARTHROPLASTY Left 09/2018       No current facility-administered medications for this encounter  Allergies   Allergen Reactions    Peach [Prunus Persica] Other (See Comments)     Closure of throat with peaches, apricots,plums , nectarine ,and almonds       Physical Exam: There were no vitals filed for this visit  General: Awake, Alert, Oriented x 3, Mood and affect appropriate  Respiratory: Respirations even and unlabored  Cardiovascular: Peripheral pulses intact; no edema  Musculoskeletal Exam:   Tenderness in lumbar spine region    ASA Score: 2         Assessment:   Low back pain and left lower extremity radiculopathy      Plan:    Proceed with left L5 transforaminal epidural steroid injection

## 2021-03-06 DIAGNOSIS — M21.961 ACQUIRED DEFORMITY OF RIGHT FOOT: ICD-10-CM

## 2021-03-06 DIAGNOSIS — M72.2 PLANTAR FASCIITIS: ICD-10-CM

## 2021-03-06 DIAGNOSIS — F41.9 ANXIETY AND DEPRESSION: ICD-10-CM

## 2021-03-06 DIAGNOSIS — M21.962 ACQUIRED DEFORMITY OF LEFT FOOT: ICD-10-CM

## 2021-03-06 DIAGNOSIS — F32.A ANXIETY AND DEPRESSION: ICD-10-CM

## 2021-03-08 RX ORDER — ESCITALOPRAM OXALATE 10 MG/1
10 TABLET ORAL DAILY
Qty: 30 TABLET | Refills: 3 | Status: SHIPPED | OUTPATIENT
Start: 2021-03-08 | End: 2021-07-25

## 2021-03-09 DIAGNOSIS — M72.2 PLANTAR FASCIITIS: ICD-10-CM

## 2021-03-09 DIAGNOSIS — M21.962 ACQUIRED DEFORMITY OF LEFT FOOT: ICD-10-CM

## 2021-03-09 DIAGNOSIS — M21.961 ACQUIRED DEFORMITY OF RIGHT FOOT: ICD-10-CM

## 2021-03-12 ENCOUNTER — TELEPHONE (OUTPATIENT)
Dept: PAIN MEDICINE | Facility: CLINIC | Age: 51
End: 2021-03-12

## 2021-03-12 RX ORDER — MELOXICAM 7.5 MG/1
7.5 TABLET ORAL DAILY
Qty: 10 TABLET | Refills: 0 | Status: ON HOLD | OUTPATIENT
Start: 2021-03-12 | End: 2021-04-16

## 2021-03-12 RX ORDER — MELOXICAM 7.5 MG/1
7.5 TABLET ORAL DAILY
Qty: 10 TABLET | Refills: 0 | Status: SHIPPED | OUTPATIENT
Start: 2021-03-12 | End: 2021-05-27 | Stop reason: SDUPTHER

## 2021-03-12 RX ORDER — GABAPENTIN 100 MG/1
100 CAPSULE ORAL 3 TIMES DAILY
Qty: 90 CAPSULE | Refills: 0 | Status: ON HOLD | OUTPATIENT
Start: 2021-03-12 | End: 2021-04-16

## 2021-03-12 RX ORDER — GABAPENTIN 100 MG/1
100 CAPSULE ORAL 3 TIMES DAILY
Qty: 90 CAPSULE | Refills: 0 | Status: SHIPPED | OUTPATIENT
Start: 2021-03-12 | End: 2021-05-27 | Stop reason: SDUPTHER

## 2021-03-15 NOTE — TELEPHONE ENCOUNTER
Pt reports 10% improvement post inj   Pain level 6/10   Pt aware I will call next week for an update

## 2021-03-29 ENCOUNTER — OFFICE VISIT (OUTPATIENT)
Dept: PAIN MEDICINE | Facility: CLINIC | Age: 51
End: 2021-03-29
Payer: COMMERCIAL

## 2021-03-29 ENCOUNTER — TELEPHONE (OUTPATIENT)
Dept: PAIN MEDICINE | Facility: CLINIC | Age: 51
End: 2021-03-29

## 2021-03-29 VITALS
DIASTOLIC BLOOD PRESSURE: 82 MMHG | WEIGHT: 170.4 LBS | BODY MASS INDEX: 30.19 KG/M2 | SYSTOLIC BLOOD PRESSURE: 123 MMHG | HEIGHT: 63 IN | HEART RATE: 89 BPM

## 2021-03-29 DIAGNOSIS — Z96.642 S/P HIP REPLACEMENT, LEFT: ICD-10-CM

## 2021-03-29 DIAGNOSIS — M25.552 PAIN IN LEFT HIP: ICD-10-CM

## 2021-03-29 DIAGNOSIS — G89.4 CHRONIC PAIN SYNDROME: ICD-10-CM

## 2021-03-29 PROCEDURE — 99214 OFFICE O/P EST MOD 30 MIN: CPT | Performed by: ANESTHESIOLOGY

## 2021-03-29 RX ORDER — HYDROCODONE BITARTRATE AND ACETAMINOPHEN 7.5; 325 MG/1; MG/1
1 TABLET ORAL 2 TIMES DAILY PRN
Qty: 60 TABLET | Refills: 0 | Status: ON HOLD | OUTPATIENT
Start: 2021-04-02 | End: 2021-04-16

## 2021-03-29 RX ORDER — HYDROCODONE BITARTRATE AND ACETAMINOPHEN 7.5; 325 MG/1; MG/1
1 TABLET ORAL 2 TIMES DAILY PRN
Qty: 60 TABLET | Refills: 0 | Status: SHIPPED | OUTPATIENT
Start: 2021-05-02 | End: 2021-06-03 | Stop reason: SDUPTHER

## 2021-03-29 NOTE — PROGRESS NOTES
Pain Medicine Follow-Up Note    Assessment:  1  Chronic pain syndrome    2  Pain in left hip    3  S/P hip replacement, left        Plan:  New Medications Ordered This Visit   Medications    HYDROcodone-acetaminophen (NORCO) 7 5-325 mg per tablet     Sig: Take 1 tablet by mouth 2 (two) times a day as needed for painMax Daily Amount: 2 tablets     Dispense:  60 tablet     Refill:  0    HYDROcodone-acetaminophen (NORCO) 7 5-325 mg per tablet     Sig: Take 1 tablet by mouth 2 (two) times a day as needed for painMax Daily Amount: 2 tablets     Dispense:  60 tablet     Refill:  0     My impressions and treatment recommendations were discussed in detail with the patient who verbalized understanding and had no further questions  The patient reports primarily left hip, left low back and left groin pain  She currently takes hydrocodone / acetaminophen 7 5/325 mg up to twice daily as needed for her pain  She states this is not really helping her with her current pain  Physical exam shows left-sided sacroiliac joint tenderness with palpation, positive STEVEN on the left side and positive left-sided facet loading  Since the majority of her pain seems to be coming from her hip and her sacroiliac joint, I feel it is appropriate to offer her a left-sided sacroiliac joint injection  Given that patient reports she is doing well on hydrocodone/ acetaminophen 7 5/325 mg 1 tablet twice daily as needed, I feel it is appropriate to continue her on this medication  She reports no side effects at this current dose  Refills were sent to her pharmacy on file with do not fill until  dates of 4/2/2021 nad 5/2/2021  [place] Prescription Drug Monitoring Program report was reviewed and was appropriate     There are risks associated with opioid medications, including dependence, addiction and tolerance  The patient understands and agrees to use these medications only as prescribed   Potential side effects of the medications include, but are not limited to, constipation, drowsiness, addiction, impaired judgment and risk of fatal overdose if not taken as prescribed  The patient was warned against driving while taking sedation medications  Sharing medications is a felony  At this point in time, the patient is showing no signs of addiction, abuse, diversion or suicidal ideation  Complete risks and benefits including bleeding, infection, tissue reaction, nerve injury and allergic reaction were discussed  The approach was demonstrated using models and literature was provided  Verbal and written consent was obtained  Follow-up is planned in 4 weeks after injection or sooner as warranted  Discharge instructions were provided  I personally saw and examined the patient and I agree with the above discussed plan of care  History of Present Illness:    Conchita Roman is a 53 y o  female who presents to [facility] for interval re-evaluation of the above stated pain complaints  The patient has a past medical and chronic pain history as outlined in the assessment sectin She was last seen on 3/5/2021 for a left L5 transforaminal epidural steroid injection which provided her with no relief  Today she presents to the office with a pain score of 6/10, stating that that this is her average pain level  Sometimes it spikes to an 8/10  Her pain is constant and she describes the quality as burning, dull aching and shooting  She currently takes hydrocodone / acetaminophen 7 5 / 325 mg for her pain which she states recently is just taking the edge off  Pain Contract Signed:  Need new contract  Last Urine Drug Screen:   12/03/2020    Other than as stated above, the patient denies any interval changes in medications, medical condition, mental condition, symptoms, or allergies since the last office visit  Review of Systems:    Review of Systems   Respiratory: Negative for shortness of breath      Cardiovascular: Negative for chest pain  Gastrointestinal: Negative for constipation, diarrhea, nausea and vomiting  Musculoskeletal: Positive for back pain and gait problem  Negative for arthralgias, joint swelling and myalgias  Decreased ROM  Joint stiffness  Pain in groin   Skin: Negative for rash  Neurological: Negative for dizziness, seizures and weakness  All other systems reviewed and are negative  Patient Active Problem List   Diagnosis    Anxiety    Poison ivy    S/P hip replacement, left    Chronic pain syndrome    Pain in left hip    Pain in both feet    Anxiety and depression    Heel pain, bilateral    Foot arch pain    Encounter for screening mammogram for breast cancer    Fatigue    Abnormal glucose    BMI 30 0-30 9,adult       Past Medical History:   Diagnosis Date    Chronic hip pain, left        Past Surgical History:   Procedure Laterality Date    ANKLE ARTHROPLASTY      BREAST LUMPECTOMY      BREAST SURGERY Bilateral     Enlargement    EPIDURAL BLOCK INJECTION Left 3/5/2021    Procedure: left L5 transforaminal epidural steroid injection ( 13016);   Surgeon: Kourtney Mann MD;  Location: [facility] MAIN OR;  Service: Pain Management     LAPAROSCOPY FOR ECTOPIC PREGNANCY      And Salpingectomy    TOTAL HIP ARTHROPLASTY Left 09/2018       Family History   Problem Relation Age of Onset    Arthritis Mother     Emphysema Mother     Breast cancer Maternal Aunt     No Known Problems Father     No Known Problems Sister     No Known Problems Brother     No Known Problems Maternal Uncle     No Known Problems Paternal Aunt     No Known Problems Paternal Uncle     No Known Problems Maternal Grandmother     No Known Problems Maternal Grandfather     No Known Problems Paternal Grandmother     No Known Problems Paternal Grandfather        Social History     Occupational History    Not on file   Tobacco Use    Smoking status: Never Smoker    Smokeless tobacco: Never Used   Linda Thao Tobacco comment: Former smoker, per allscripts   Substance and Sexual Activity    Alcohol use: No     Comment: social drinker, per allscripts    Drug use: No    Sexual activity: Not on file         Current Outpatient Medications:     gabapentin (NEURONTIN) 100 mg capsule, Take 1 capsule (100 mg total) by mouth 3 (three) times a day, Disp: 90 capsule, Rfl: 0    gabapentin (NEURONTIN) 100 mg capsule, Take 1 capsule (100 mg total) by mouth 3 (three) times a day, Disp: 90 capsule, Rfl: 0    [START ON 5/2/2021] HYDROcodone-acetaminophen (NORCO) 7 5-325 mg per tablet, Take 1 tablet by mouth 2 (two) times a day as needed for painMax Daily Amount: 2 tablets, Disp: 60 tablet, Rfl: 0    naloxone (NARCAN) 4 mg/0 1 mL nasal spray, Administer 1 spray into a nostril  If no response after 2-3 minutes, give another dose in the other nostril using a new spray , Disp: 1 each, Rfl: 1    escitalopram (LEXAPRO) 10 mg tablet, Take 1 tablet (10 mg total) by mouth daily (Patient not taking: Reported on 3/29/2021), Disp: 30 tablet, Rfl: 3    HYDROcodone-acetaminophen (NORCO) 7 5-325 mg per tablet, Take 1 tablet by mouth 2 (two) times a day as needed for painMax Daily Amount: 2 tablets, Disp: 60 tablet, Rfl: 0    meloxicam (MOBIC) 7 5 mg tablet, Take 1 tablet (7 5 mg total) by mouth daily for 10 days, Disp: 10 tablet, Rfl: 0    meloxicam (MOBIC) 7 5 mg tablet, Take 1 tablet (7 5 mg total) by mouth daily for 10 days, Disp: 10 tablet, Rfl: 0    Allergies   Allergen Reactions    Peach [Prunus Persica] Other (See Comments)     Closure of throat with peaches, apricots,plums , nectarine ,and almonds       Physical Exam:    /82   Pulse 89   Ht 5' 2 5" (1 588 m)   Wt 77 3 kg (170 lb 6 4 oz)   BMI 30 67 kg/m²     Constitutional:normal, well developed, well nourished, alert, in no distress and non-toxic and no overt pain behavior    Eyes:anicteric  HEENT:grossly intact  Neck:supple, symmetric, trachea midline and no masses Pulmonary:even and unlabored  Cardiovascular:No edema or pitting edema present  Skin:Normal without rashes or lesions and well hydrated  Psychiatric:Mood and affect appropriate  Neurologic:Cranial Nerves II-XII grossly intact  Musculoskeletal:normal   Lumbar Spine Exam    Appearance:  Normal lordosis  Palpation/Tenderness:  left lumbar paraspinal tenderness  left sacroiliac joint tenderness  Sensory:  no sensory deficits noted  Special Tests:  Left Straight Leg Test:  negative  Left Jake's Maneuver:  negative  Left Piriformis Stretch Test:  negative

## 2021-03-29 NOTE — TELEPHONE ENCOUNTER
Scheduled pt for [facility] for 4/16/21    Went over pre-procedure instructions below:  Nothing to eat or drink 1 hr prior to procedure  Need to arrange transportation  Proper clothing for procedure  If ill or placed on antibiotics please call to reschedule  COVID test schedule for 4/10/21 at [facility]

## 2021-04-10 DIAGNOSIS — Z11.59 SPECIAL SCREENING EXAMINATION FOR UNSPECIFIED VIRAL DISEASE: ICD-10-CM

## 2021-04-10 PROCEDURE — 87635 SARS-COV-2 COVID-19 AMP PRB: CPT

## 2021-04-11 LAB — SARS-COV-2 RNA RESP QL NAA+PROBE: NEGATIVE

## 2021-04-16 ENCOUNTER — APPOINTMENT (OUTPATIENT)
Dept: RADIOLOGY | Facility: HOSPITAL | Age: 51
End: 2021-04-16
Payer: COMMERCIAL

## 2021-04-16 ENCOUNTER — HOSPITAL ENCOUNTER (OUTPATIENT)
Facility: AMBULARY SURGERY CENTER | Age: 51
Setting detail: OUTPATIENT SURGERY
Discharge: HOME | End: 2021-04-16
Attending: ANESTHESIOLOGY | Admitting: ANESTHESIOLOGY
Payer: COMMERCIAL

## 2021-04-16 VITALS
SYSTOLIC BLOOD PRESSURE: 133 MMHG | DIASTOLIC BLOOD PRESSURE: 91 MMHG | WEIGHT: 170 LBS | RESPIRATION RATE: 18 BRPM | OXYGEN SATURATION: 100 % | HEART RATE: 61 BPM | TEMPERATURE: 96.3 F | HEIGHT: 62 IN | BODY MASS INDEX: 31.28 KG/M2

## 2021-04-16 PROCEDURE — 72200 X-RAY EXAM SI JOINTS: CPT

## 2021-04-16 PROCEDURE — 27096 INJECT SACROILIAC JOINT: CPT | Performed by: ANESTHESIOLOGY

## 2021-04-16 RX ORDER — BUPIVACAINE HYDROCHLORIDE 2.5 MG/ML
INJECTION, SOLUTION EPIDURAL; INFILTRATION; INTRACAUDAL AS NEEDED
Status: DISCONTINUED | OUTPATIENT
Start: 2021-04-16 | End: 2021-04-16 | Stop reason: HOSPADM

## 2021-04-16 RX ORDER — METHYLPREDNISOLONE ACETATE 80 MG/ML
INJECTION, SUSPENSION INTRA-ARTICULAR; INTRALESIONAL; INTRAMUSCULAR; SOFT TISSUE AS NEEDED
Status: DISCONTINUED | OUTPATIENT
Start: 2021-04-16 | End: 2021-04-16 | Stop reason: HOSPADM

## 2021-04-16 RX ORDER — LIDOCAINE WITH 8.4% SOD BICARB 0.9%(10ML)
SYRINGE (ML) INJECTION AS NEEDED
Status: DISCONTINUED | OUTPATIENT
Start: 2021-04-16 | End: 2021-04-16 | Stop reason: HOSPADM

## 2021-04-16 NOTE — OP NOTE
ATTENDING PHYSICIAN:  Oliva English MD     PROCEDURE: Left sacroiliac joint injection with steroid and local anesthetic under fluoroscopic guidance  PREPROCEDURE DIAGNOSIS:  Sacroiliitis  POSTPROCEDURE DIAGNOSIS: Sacroiliitis  ANESTHESIA:  Local     ESTIMATED BLOOD LOSS:  Minimal     COMPLICATIONS:  None  LOCATION:  [facility]  CONSENT:  Today's procedure, its potential benefits as well as its risks and potential side effects were reviewed  Discussed risks of the procedure including bleeding, infection, nerve irritation or damage, reactions to the medications, headache, failure of the pain to improve, and potential worsening of the pain were explained to the patient who verbalized understanding and who wished to proceed  Written informed consent was thereby obtained  DESCRIPTION OF THE PROCEDURE:  After written informed consent was obtained, the patient was taken to the fluoroscopy suite and placed in the prone position  Anatomical landmarks were identified by way of fluoroscopy in multiple views  The skin overlying the sacroiliac region was prepped using antiseptic and draped in the usual sterile fashion  Strict aseptic technique was utilized  The skin and subcutaneous tissues at the needle entry site were infiltrated with 5 mL of 1% preservative-free lidocaine using a 25-gauge 1-1/2-inch needle  A 22-gauge 3-1/2-inch needle was then incrementally advanced using multiple fluoroscopic views into the inferior posterior pole of the sacroiliac joint  Proper needle placement was confirmed with the aid of fluoroscopy in the AP and lateral views  After negative aspiration, contrast was injected which delineated the sacroiliac joint under fluoroscopy in the AP view  After negative aspiration was reconfirmed, a 3 mL mixture consisting of 2 mL of preservative-free 0 25% bupivacaine mixed with 1 mL of 80 mg/mL of Depo-Medrol was slowly injected      The patient tolerated the procedure well and all needles were removed with the tips intact  Hemostasis was maintained  There were no apparent paresthesias or complications  The skin was wiped clean and a Band-Aid was placed as appropriate  The patient was monitored for an appropriate period of time following the procedure and remained hemodynamically stable and neurovascularly intact following the procedure  The patient was ultimately discharged to home with supervision in good condition and instructed to call the office in a few days for an update or sooner as warranted  I was present and participated in all key and critical portions of this procedure      Christiane Zhang MD  4/16/2021  11:03 AM

## 2021-04-16 NOTE — DISCHARGE INSTRUCTIONS

## 2021-04-23 ENCOUNTER — TELEPHONE (OUTPATIENT)
Dept: PAIN MEDICINE | Facility: CLINIC | Age: 51
End: 2021-04-23

## 2021-04-26 ENCOUNTER — TELEPHONE (OUTPATIENT)
Dept: PAIN MEDICINE | Facility: CLINIC | Age: 51
End: 2021-04-26

## 2021-04-26 NOTE — TELEPHONE ENCOUNTER
----- Message from Jose Zheng sent at 4/26/2021  7:19 AM EDT -----  Regarding: Visit Follow-Up Question  Contact: [phone]  I have had no change since the injection,still in pain I'm at a loss

## 2021-04-26 NOTE — TELEPHONE ENCOUNTER
S/w the patient in regards to the previous message  Reviewed the postop instructions again  Inquired about her medications  She stated she never started the gabapentin  Encouraged her to f/u c/ Dr Emery Castellanos for titration orders  Encouraged the patient to give the injection a full two weeks to receive the full effects  She inquired as to if there was anything else she could do to relieve the pain  Encouraged her to start the gabapentin  AS to advise, otherwise

## 2021-04-27 NOTE — TELEPHONE ENCOUNTER
Pt reports no improvement post inj   Pain level 10/10   Pt aware I will call next week for an update

## 2021-05-17 ENCOUNTER — TELEPHONE (OUTPATIENT)
Dept: PAIN MEDICINE | Facility: CLINIC | Age: 51
End: 2021-05-17

## 2021-05-17 NOTE — TELEPHONE ENCOUNTER
----- Message from Lesly Varghese MD sent at 5/17/2021 10:30 AM EDT -----  Regarding: [employer] 53 Letter  Received a letter from [employer] regarding the patient being on alprazolam and hydrocodone  Please let the patient know that there is a black box warning regarding opioid and benzodiazepines  She will have to decide whether she would like to continue the alprazolam or the hydrocodone /acetaminophen  Because of the new FDA black box warning for opioid medications with benzodiazepines, the patient will have to decide whether to continue opioid medication prescribing by myself or continue benzodiazepine therapy with the prescribing physician  If the patient continues opioid medications with myself, the patient will have to be weaned off of benzodiazepines  If the patient needs to be weaned off of benzodiazepines, the patient will need to discuss this with the prescribing physician     Isidro simon    Also, there is an article from Dr Avis Richard from the FDA urging providers to discontinue either benzodiazepines or opioid medications      [employer]

## 2021-05-20 DIAGNOSIS — M21.961 ACQUIRED DEFORMITY OF RIGHT FOOT: ICD-10-CM

## 2021-05-20 DIAGNOSIS — M21.962 ACQUIRED DEFORMITY OF LEFT FOOT: ICD-10-CM

## 2021-05-20 DIAGNOSIS — M72.2 PLANTAR FASCIITIS: ICD-10-CM

## 2021-05-20 RX ORDER — MELOXICAM 7.5 MG/1
7.5 TABLET ORAL DAILY
Qty: 10 TABLET | Refills: 0 | Status: CANCELLED | OUTPATIENT
Start: 2021-05-20 | End: 2021-05-30

## 2021-05-20 RX ORDER — GABAPENTIN 100 MG/1
100 CAPSULE ORAL 3 TIMES DAILY
Qty: 90 CAPSULE | Refills: 0 | Status: CANCELLED | OUTPATIENT
Start: 2021-05-20 | End: 2021-06-19

## 2021-05-24 NOTE — TELEPHONE ENCOUNTER
RN s/w pt  Advised pt of previous  Pt states that she stopped taking alprazolam the day of her OVS with [facility]  She f/u with her PCP who prescribed Lexapro that would not interact with her Hydrocodone  Pt reports that injections have not worked for her pain  On a daily basis she is having #9-10/10 pain  in her left groin that is stabbing and now radiates to the top of her thigh  She states that her lower back pain is worse at HS and any quick movements make her pain worse  Reports that tylenol and Nsaids do not help her pain but ice does help to her lower back in the am    Pt is requesting if she would be able to increase her Hydrocodone to 3x's per day? Advised pt that RN would notify AS and get back to her with response  Pt has OVS 6/3  Please advise  Thank you

## 2021-05-25 NOTE — TELEPHONE ENCOUNTER
Is the issue with the hydrocodone/acetaminophen that it is not lasting long enough or not giving her enough relief when she takes it?

## 2021-05-26 DIAGNOSIS — M72.2 PLANTAR FASCIITIS: ICD-10-CM

## 2021-05-26 DIAGNOSIS — M21.961 ACQUIRED DEFORMITY OF RIGHT FOOT: ICD-10-CM

## 2021-05-26 DIAGNOSIS — M21.962 ACQUIRED DEFORMITY OF LEFT FOOT: ICD-10-CM

## 2021-05-26 RX ORDER — GABAPENTIN 100 MG/1
100 CAPSULE ORAL 3 TIMES DAILY
Qty: 90 CAPSULE | Refills: 0 | Status: CANCELLED | OUTPATIENT
Start: 2021-05-26 | End: 2021-06-25

## 2021-05-26 RX ORDER — MELOXICAM 7.5 MG/1
7.5 TABLET ORAL DAILY
Qty: 10 TABLET | Refills: 0 | Status: CANCELLED | OUTPATIENT
Start: 2021-05-26 | End: 2021-06-05

## 2021-05-26 NOTE — TELEPHONE ENCOUNTER
At this point, I think she should consider doing a dorsal root ganglion stimulator trial with [employer]  Please give her information regarding the procedure and have her meet with someone from [employer] to discuss the procedure in detail  If and when she is ready to move forward, I am happy to do so  I will also be seeing her for a follow up appointment in the next week or two so I will discuss medication changes at her office visit

## 2021-05-26 NOTE — TELEPHONE ENCOUNTER
S/w the patient to review and she stated basically it's both  The medication is not helping at all  She doesn't know if she could be overdoing it  She has started exercising again and the pain continues  Emotional support provided  Please advise   Thanks

## 2021-05-26 NOTE — TELEPHONE ENCOUNTER
S/w the patient and reviewed  She stated she would like to think about the DRG   Information mailed out today and she will wait to discuss everything at her next ovs

## 2021-05-27 DIAGNOSIS — M72.2 PLANTAR FASCIITIS: ICD-10-CM

## 2021-05-27 DIAGNOSIS — M21.962 ACQUIRED DEFORMITY OF LEFT FOOT: ICD-10-CM

## 2021-05-27 DIAGNOSIS — M21.961 ACQUIRED DEFORMITY OF RIGHT FOOT: ICD-10-CM

## 2021-05-27 RX ORDER — MELOXICAM 7.5 MG/1
7.5 TABLET ORAL DAILY
Qty: 10 TABLET | Refills: 0 | Status: SHIPPED | OUTPATIENT
Start: 2021-05-27 | End: 2021-07-25 | Stop reason: SDUPTHER

## 2021-05-27 RX ORDER — GABAPENTIN 100 MG/1
100 CAPSULE ORAL 3 TIMES DAILY
Qty: 90 CAPSULE | Refills: 0 | Status: SHIPPED | OUTPATIENT
Start: 2021-05-27 | End: 2021-07-25 | Stop reason: SDUPTHER

## 2021-06-03 ENCOUNTER — OFFICE VISIT (OUTPATIENT)
Dept: PAIN MEDICINE | Facility: CLINIC | Age: 51
End: 2021-06-03
Payer: COMMERCIAL

## 2021-06-03 VITALS
HEART RATE: 76 BPM | BODY MASS INDEX: 31.76 KG/M2 | HEIGHT: 62 IN | DIASTOLIC BLOOD PRESSURE: 80 MMHG | WEIGHT: 172.6 LBS | SYSTOLIC BLOOD PRESSURE: 118 MMHG

## 2021-06-03 DIAGNOSIS — Z96.642 S/P HIP REPLACEMENT, LEFT: ICD-10-CM

## 2021-06-03 DIAGNOSIS — M25.552 PAIN IN LEFT HIP: ICD-10-CM

## 2021-06-03 DIAGNOSIS — G89.4 CHRONIC PAIN SYNDROME: Primary | ICD-10-CM

## 2021-06-03 PROCEDURE — 99214 OFFICE O/P EST MOD 30 MIN: CPT | Performed by: ANESTHESIOLOGY

## 2021-06-03 RX ORDER — HYDROCODONE BITARTRATE AND ACETAMINOPHEN 7.5; 325 MG/1; MG/1
1 TABLET ORAL 3 TIMES DAILY PRN
Qty: 90 TABLET | Refills: 0 | Status: SHIPPED | OUTPATIENT
Start: 2021-06-03 | End: 2021-08-02 | Stop reason: SDUPTHER

## 2021-06-03 RX ORDER — HYDROCODONE BITARTRATE AND ACETAMINOPHEN 7.5; 325 MG/1; MG/1
1 TABLET ORAL 3 TIMES DAILY PRN
Qty: 90 TABLET | Refills: 0 | Status: SHIPPED | OUTPATIENT
Start: 2021-07-03 | End: 2021-08-02 | Stop reason: SDUPTHER

## 2021-06-03 NOTE — PROGRESS NOTES
Pain Medicine Follow-Up Note    Assessment:  1  Chronic pain syndrome    2  Pain in left hip    3  S/P hip replacement, left        Plan:  New Medications Ordered This Visit   Medications    HYDROcodone-acetaminophen (NORCO) 7 5-325 mg per tablet     Sig: Take 1 tablet by mouth 3 (three) times a day as needed for painMax Daily Amount: 3 tablets     Dispense:  90 tablet     Refill:  0    HYDROcodone-acetaminophen (NORCO) 7 5-325 mg per tablet     Sig: Take 1 tablet by mouth 3 (three) times a day as needed for painMax Daily Amount: 3 tablets     Dispense:  90 tablet     Refill:  0     My impressions and treatment recommendations were discussed in detail with the patient who verbalized understanding and had no further questions  The patient is requesting increasing the hydrocodone/acetaminophen from b i d  dosing to t i d  dosing  Since she is not having enough relief from this medication, I felt a reasonable to do so  I have prescribed her hydrocodone/acetaminophen 7 5/325 mg 1 tablet up to 3 times daily as needed for pain  I sent E prescriptions to the pharmacy dated Caitlyn 3, 2021 and July 3, 2021  The risks and side effects of chronic opioid treatment were discussed in detail with the patient  Side effects include but are not limited to nausea, vomiting, GI intolerance, sedation, constipation, mental clouding, opioid-induced hyperalgesia, endocrine dysfunction, addiction, dependence, and tolerance  The patient was asked to take his medications only as prescribed and directed, never in excess, and never for any other reason other than for pain control  The patient was also asked to keep his medications out of the reach of others and away from children, preferably in a locked drawer  The patient verbalized understanding and wished to use these opioid medications      [place] Prescription Drug Monitoring Program report was reviewed and was appropriate     In addition I did discuss with the patient about the possibility of undergoing a dorsal root ganglion stimulator trial with [employer] MAJANO  Adreal  She has undergone multiple injections by myself and has not had any pain relieving responses to them  I did give her an informational packet get regarding dorsal root ganglion stimulator technology and I will have her meet with the [employer] representatives regarding an educational session for dorsal root ganglion stimulation  Follow-up is planned in 2 months time or sooner as warranted  Discharge instructions were provided  I personally saw and examined the patient and I agree with the above discussed plan of care  History of Present Illness:    Kamran Sen is a 53 y o  female who presents to [facility] for interval re-evaluation of the above stated pain complaints  The patient has a past medical and chronic pain history as outlined in the assessment section  She was last seen on April 16, 2021 at which time she underwent a left sacroiliac joint injection  At today's office visit, the patient's pain score is 8/10 on the verbal numerical pain rating scale  The patient states that her pain is primarily in her left hip and left buttocks region  She describes her pain as worse at night  Her pain is constant in nature  She reports the quality of her pain as sharp, pressure-like, tightness, and shooting  She is reporting that the hydrocodone/acetaminophen 7 5/325 mg twice daily as needed for pain is no longer giving her as much relief as she would like  She also reports that the left sacroiliac joint injection on April 16, 2021 did not give her as much relief as she would have liked  She is requesting an increase to 3 times a day dosing for the hydrocodone/acetaminophen  She denies opioid induced constipation  She also states that she is willing to consider other interventional pain procedures, but is discouraged that none of the procedures have worked for her per so far      Other than as stated above, the patient denies any interval changes in medications, medical condition, mental condition, symptoms, or allergies since the last office visit  Review of Systems:    Review of Systems   Respiratory: Negative for shortness of breath  Cardiovascular: Negative for chest pain  Gastrointestinal: Negative for constipation, diarrhea, nausea and vomiting  Musculoskeletal: Positive for back pain (Left Low Sided Back Pain) and gait problem (At Night )  Negative for arthralgias, joint swelling and myalgias  Decreased ROM, Joint Stiffness, Pain in left groin  Skin: Negative for rash  Neurological: Negative for dizziness, seizures and weakness  All other systems reviewed and are negative  Patient Active Problem List   Diagnosis    Anxiety    Poison ivy    S/P hip replacement, left    Chronic pain syndrome    Pain in left hip    Pain in both feet    Anxiety and depression    Heel pain, bilateral    Foot arch pain    Encounter for screening mammogram for breast cancer    Fatigue    Abnormal glucose    BMI 30 0-30 9,adult       Past Medical History:   Diagnosis Date    Chronic hip pain, left        Past Surgical History:   Procedure Laterality Date    ANKLE ARTHROPLASTY      BREAST LUMPECTOMY      BREAST SURGERY Bilateral     Enlargement    EPIDURAL BLOCK INJECTION Left 3/5/2021    Procedure: left L5 transforaminal epidural steroid injection ( 44841); Surgeon: Lynnette Angela MD;  Location: [facility] MAIN OR;  Service: Pain Management     LAPAROSCOPY FOR ECTOPIC PREGNANCY      And Salpingectomy    STEROID INJECTION HIP Left 4/16/2021    Procedure: Sacroiliac Joint Injection (41340);   Surgeon: Lynnette Angela MD;  [facility];  Service: Pain Management     TOTAL HIP ARTHROPLASTY Left 09/2018       Family History   Problem Relation Age of Onset    Arthritis Mother     Emphysema Mother     Breast cancer Maternal Aunt     No Known Problems Father     No Known Problems Sister     No Known Problems Brother     No Known Problems Maternal Uncle     No Known Problems Paternal Aunt     No Known Problems Paternal Uncle     No Known Problems Maternal Grandmother     No Known Problems Maternal Grandfather     No Known Problems Paternal Grandmother     No Known Problems Paternal Grandfather        Social History     Occupational History    Not on file   Tobacco Use    Smoking status: Never Smoker    Smokeless tobacco: Never Used    Tobacco comment: Former smoker, per allscripts   Substance and Sexual Activity    Alcohol use: No     Comment: social drinker, per allscripts    Drug use: No    Sexual activity: Not on file         Current Outpatient Medications:     gabapentin (NEURONTIN) 100 mg capsule, Take 1 capsule (100 mg total) by mouth 3 (three) times a day, Disp: 90 capsule, Rfl: 0    meloxicam (MOBIC) 7 5 mg tablet, Take 1 tablet (7 5 mg total) by mouth daily for 10 days, Disp: 10 tablet, Rfl: 0    naloxone (NARCAN) 4 mg/0 1 mL nasal spray, Administer 1 spray into a nostril   If no response after 2-3 minutes, give another dose in the other nostril using a new spray , Disp: 1 each, Rfl: 1    escitalopram (LEXAPRO) 10 mg tablet, Take 1 tablet (10 mg total) by mouth daily (Patient not taking: Reported on 3/29/2021), Disp: 30 tablet, Rfl: 3    HYDROcodone-acetaminophen (NORCO) 7 5-325 mg per tablet, Take 1 tablet by mouth 3 (three) times a day as needed for painMax Daily Amount: 3 tablets, Disp: 90 tablet, Rfl: 0    [START ON 7/3/2021] HYDROcodone-acetaminophen (NORCO) 7 5-325 mg per tablet, Take 1 tablet by mouth 3 (three) times a day as needed for painMax Daily Amount: 3 tablets, Disp: 90 tablet, Rfl: 0    Allergies   Allergen Reactions    Peach [Prunus Persica] Other (See Comments)     Closure of throat with peaches, apricots,plums , nectarine ,and almonds       Physical Exam:    /80   Pulse 76   Ht 5' 2" (1 575 m)   Wt 78 3 kg (172 lb 9 6 oz)   BMI 31 57 kg/m²     Constitutional:obese  Eyes:anicteric  HEENT:grossly intact  Neck:supple, symmetric, trachea midline and no masses   Pulmonary:even and unlabored  Cardiovascular:No edema or pitting edema present  Skin:Normal without rashes or lesions and well hydrated  Psychiatric:Mood and affect appropriate  Neurologic:Cranial Nerves II-XII grossly intact  Musculoskeletal:normal

## 2021-06-28 ENCOUNTER — OFFICE VISIT (OUTPATIENT)
Dept: FAMILY MEDICINE CLINIC | Facility: CLINIC | Age: 51
End: 2021-06-28
Payer: COMMERCIAL

## 2021-06-28 VITALS
TEMPERATURE: 97.7 F | HEART RATE: 86 BPM | HEIGHT: 62 IN | WEIGHT: 171.4 LBS | DIASTOLIC BLOOD PRESSURE: 88 MMHG | BODY MASS INDEX: 31.54 KG/M2 | OXYGEN SATURATION: 98 % | RESPIRATION RATE: 18 BRPM | SYSTOLIC BLOOD PRESSURE: 124 MMHG

## 2021-06-28 DIAGNOSIS — B02.9 HERPES ZOSTER WITHOUT COMPLICATION: ICD-10-CM

## 2021-06-28 DIAGNOSIS — B02.9 HERPES ZOSTER WITHOUT COMPLICATION: Primary | ICD-10-CM

## 2021-06-28 PROCEDURE — 99213 OFFICE O/P EST LOW 20 MIN: CPT | Performed by: NURSE PRACTITIONER

## 2021-06-28 RX ORDER — LIDOCAINE 50 MG/G
OINTMENT TOPICAL AS NEEDED
Qty: 35.44 G | Refills: 0 | Status: SHIPPED | OUTPATIENT
Start: 2021-06-28 | End: 2021-06-28 | Stop reason: SDUPTHER

## 2021-06-28 RX ORDER — VALACYCLOVIR HYDROCHLORIDE 1 G/1
1000 TABLET, FILM COATED ORAL 3 TIMES DAILY
Qty: 21 TABLET | Refills: 0 | Status: SHIPPED | OUTPATIENT
Start: 2021-06-28 | End: 2021-06-28 | Stop reason: SDUPTHER

## 2021-06-28 RX ORDER — LIDOCAINE 50 MG/G
OINTMENT TOPICAL AS NEEDED
Qty: 35.44 G | Refills: 0 | Status: SHIPPED | OUTPATIENT
Start: 2021-06-28 | End: 2021-12-17 | Stop reason: HOSPADM

## 2021-06-28 RX ORDER — VALACYCLOVIR HYDROCHLORIDE 1 G/1
1000 TABLET, FILM COATED ORAL 3 TIMES DAILY
Qty: 21 TABLET | Refills: 0 | Status: SHIPPED | OUTPATIENT
Start: 2021-06-28 | End: 2021-12-17 | Stop reason: HOSPADM

## 2021-06-28 NOTE — PROGRESS NOTES
Assessment/Plan     Herpes Zoster  The case discussed with patient using patient centered shared decision making  The patient was counseled regarding instructions for management,-- risk factor reductions,-- prognosis,-- impressions,-- risks and benefits of treatment options,-- importance of compliance with treatment  I have reviewed the instructions with the patient, answering all questions to her satisfaction  Medications: valtrex and topical lidocaine  Verbal patient instruction given  Follow up in 4 days with pcp         Marlen is a 53 y o  female who presents for evaluation of a rash involving the posterior neck  Rash started 3 days ago  Lesions are red, and blistering in texture  Rash has changed over time  Rash is burning, tender  Associated symptoms: none  Patient denies: cough, fever, headache, myalgia and vomiting  Patient has not had contacts with similar rash  Patient has not had new exposures (soaps, lotions, laundry detergents, foods, medications, plants, insects or animals)  The following portions of the patient's history were reviewed and updated as appropriate: allergies, current medications, past family history, past medical history, past social history, past surgical history and problem list     Review of Systems  Pertinent items are noted in HPI       Objective     /88 (BP Location: Left arm, Patient Position: Sitting, Cuff Size: Large)   Pulse 86   Temp 97 7 °F (36 5 °C)   Resp 18   Ht 5' 2" (1 575 m)   Wt 77 7 kg (171 lb 6 4 oz)   SpO2 98%   BMI 31 35 kg/m²   General:  alert and oriented, in no acute distress   Skin:  vesicles noted on posterior neck     Answers for HPI/ROS submitted by the patient on 6/27/2021  Onset: in the past 7 days  Progression since onset: gradually worsening  Affected locations: neck, back  Characteristics: blistering, burning, pain, redness, itchiness  Exposed to: nothing  anorexia: No  congestion: No  cough: No  diarrhea: No  eye pain: No  facial edema: No  fatigue: No  fever: No  joint pain: No  nail changes: No  rhinorrhea: No  shortness of breath: No  sore throat: No  vomiting: No

## 2021-07-02 ENCOUNTER — OFFICE VISIT (OUTPATIENT)
Dept: FAMILY MEDICINE CLINIC | Facility: CLINIC | Age: 51
End: 2021-07-02
Payer: COMMERCIAL

## 2021-07-02 VITALS
TEMPERATURE: 98 F | HEART RATE: 98 BPM | WEIGHT: 171 LBS | RESPIRATION RATE: 16 BRPM | DIASTOLIC BLOOD PRESSURE: 86 MMHG | HEIGHT: 62 IN | BODY MASS INDEX: 31.47 KG/M2 | SYSTOLIC BLOOD PRESSURE: 110 MMHG

## 2021-07-02 DIAGNOSIS — B02.9 HERPES ZOSTER WITHOUT COMPLICATION: Primary | ICD-10-CM

## 2021-07-02 PROCEDURE — 99213 OFFICE O/P EST LOW 20 MIN: CPT | Performed by: FAMILY MEDICINE

## 2021-07-03 NOTE — PROGRESS NOTES
Assessment/Plan:  Diagnoses and all orders for this visit:    Herpes zoster without complication    BMI 95 8-31 3,LZCZT    Herpes zoster without complication  Improving w Valtrex  Cont lidoderm patch prn  rx Gabapentin (has home from podiatry from other dx)      BMI Counseling: Body mass index is 31 28 kg/m²  The BMI is above normal  Nutrition recommendations include encouraging healthy choices of fruits and vegetables, consuming healthier snacks, moderation in carbohydrate intake, increasing intake of lean protein, reducing intake of saturated and trans fat and reducing intake of cholesterol  Exercise recommendations include exercising 3-5 times per week and strength training exercises  No pharmacotherapy was ordered  Subjective:      Patient ID: Denisse Goldberg is a 53 y o  female  Chief Complaint   Patient presents with    Follow-up     shingles pain is running across her back and up to neck ,she has a hard lump under right ear        HPI  Follow-up shingles  Improving w valtrex use  Lesions scabbing, no new crops  No fever/chills  Tolerating med w/o side effect  No new sxs    The following portions of the patient's history were reviewed and updated as appropriate: allergies, current medications, past family history, past medical history, past social history, past surgical history and problem list     Review of Systems   Constitutional: Positive for fatigue  Negative for fever  Respiratory: Negative  Cardiovascular: Negative  Skin: Positive for rash           Current Outpatient Medications   Medication Sig Dispense Refill    escitalopram (LEXAPRO) 10 mg tablet Take 1 tablet (10 mg total) by mouth daily 30 tablet 3    HYDROcodone-acetaminophen (NORCO) 7 5-325 mg per tablet Take 1 tablet by mouth 3 (three) times a day as needed for painMax Daily Amount: 3 tablets 90 tablet 0    [START ON 7/3/2021] HYDROcodone-acetaminophen (NORCO) 7 5-325 mg per tablet Take 1 tablet by mouth 3 (three) times a day as needed for painMax Daily Amount: 3 tablets 90 tablet 0    lidocaine (XYLOCAINE) 5 % ointment Apply topically as needed for mild pain (shingels pain) 35 44 g 0    naloxone (NARCAN) 4 mg/0 1 mL nasal spray Administer 1 spray into a nostril  If no response after 2-3 minutes, give another dose in the other nostril using a new spray  1 each 1    valACYclovir (VALTREX) 1,000 mg tablet Take 1 tablet (1,000 mg total) by mouth 3 (three) times a day for 7 days 21 tablet 0    gabapentin (NEURONTIN) 100 mg capsule Take 1 capsule (100 mg total) by mouth 3 (three) times a day 90 capsule 0    meloxicam (MOBIC) 7 5 mg tablet Take 1 tablet (7 5 mg total) by mouth daily for 10 days 10 tablet 0     No current facility-administered medications for this visit  Objective:    /86 (BP Location: Left arm, Patient Position: Sitting, Cuff Size: Large)   Pulse 98   Temp 98 °F (36 7 °C)   Resp 16   Ht 5' 2" (1 575 m)   Wt 77 6 kg (171 lb)   BMI 31 28 kg/m²        Physical Exam  Vitals and nursing note reviewed  Constitutional:       General: She is not in acute distress  Neck:      Comments: Right postauricular LAD  Cardiovascular:      Rate and Rhythm: Normal rate and regular rhythm  Pulmonary:      Effort: Pulmonary effort is normal  No respiratory distress  Breath sounds: Normal breath sounds  Musculoskeletal:      Cervical back: Neck supple  Skin:     General: Skin is warm and dry  Findings: Rash present  Comments: Scabbed lesions right posterior neck     Neurological:      Mental Status: She is alert and oriented to person, place, and time     Psychiatric:         Mood and Affect: Mood normal              Jena Pierre MD

## 2021-07-25 DIAGNOSIS — M72.2 PLANTAR FASCIITIS: ICD-10-CM

## 2021-07-25 DIAGNOSIS — M21.962 ACQUIRED DEFORMITY OF LEFT FOOT: ICD-10-CM

## 2021-07-25 DIAGNOSIS — F41.9 ANXIETY AND DEPRESSION: ICD-10-CM

## 2021-07-25 DIAGNOSIS — F32.A ANXIETY AND DEPRESSION: ICD-10-CM

## 2021-07-25 DIAGNOSIS — M21.961 ACQUIRED DEFORMITY OF RIGHT FOOT: ICD-10-CM

## 2021-07-25 RX ORDER — ESCITALOPRAM OXALATE 10 MG/1
TABLET ORAL
Qty: 30 TABLET | Refills: 3 | Status: SHIPPED | OUTPATIENT
Start: 2021-07-25 | End: 2021-12-02

## 2021-07-26 RX ORDER — GABAPENTIN 100 MG/1
100 CAPSULE ORAL 3 TIMES DAILY
Qty: 90 CAPSULE | Refills: 0 | Status: SHIPPED | OUTPATIENT
Start: 2021-07-26 | End: 2021-10-03 | Stop reason: SDUPTHER

## 2021-07-26 RX ORDER — MELOXICAM 7.5 MG/1
7.5 TABLET ORAL DAILY
Qty: 10 TABLET | Refills: 0 | Status: SHIPPED | OUTPATIENT
Start: 2021-07-26 | End: 2021-10-03 | Stop reason: SDUPTHER

## 2021-08-02 ENCOUNTER — OFFICE VISIT (OUTPATIENT)
Dept: PAIN MEDICINE | Facility: CLINIC | Age: 51
End: 2021-08-02
Payer: COMMERCIAL

## 2021-08-02 VITALS
BODY MASS INDEX: 31.47 KG/M2 | DIASTOLIC BLOOD PRESSURE: 79 MMHG | HEART RATE: 88 BPM | HEIGHT: 62 IN | SYSTOLIC BLOOD PRESSURE: 108 MMHG | WEIGHT: 171 LBS

## 2021-08-02 DIAGNOSIS — F11.20 UNCOMPLICATED OPIOID DEPENDENCE (HCC): ICD-10-CM

## 2021-08-02 DIAGNOSIS — Z96.642 S/P HIP REPLACEMENT, LEFT: ICD-10-CM

## 2021-08-02 DIAGNOSIS — Z79.891 LONG-TERM CURRENT USE OF OPIATE ANALGESIC: ICD-10-CM

## 2021-08-02 DIAGNOSIS — M25.552 PAIN IN LEFT HIP: ICD-10-CM

## 2021-08-02 DIAGNOSIS — G89.4 CHRONIC PAIN SYNDROME: Primary | ICD-10-CM

## 2021-08-02 PROCEDURE — 80305 DRUG TEST PRSMV DIR OPT OBS: CPT | Performed by: ANESTHESIOLOGY

## 2021-08-02 PROCEDURE — 99214 OFFICE O/P EST MOD 30 MIN: CPT | Performed by: ANESTHESIOLOGY

## 2021-08-02 RX ORDER — HYDROCODONE BITARTRATE AND ACETAMINOPHEN 7.5; 325 MG/1; MG/1
1 TABLET ORAL 3 TIMES DAILY PRN
Qty: 90 TABLET | Refills: 0 | Status: SHIPPED | OUTPATIENT
Start: 2021-09-02 | End: 2021-09-30 | Stop reason: SDUPTHER

## 2021-08-02 RX ORDER — HYDROCODONE BITARTRATE AND ACETAMINOPHEN 7.5; 325 MG/1; MG/1
1 TABLET ORAL 3 TIMES DAILY PRN
Qty: 90 TABLET | Refills: 0 | Status: SHIPPED | OUTPATIENT
Start: 2021-08-03 | End: 2021-09-30 | Stop reason: SDUPTHER

## 2021-08-02 NOTE — PROGRESS NOTES
Pain Medicine Follow-Up Note    Assessment:  1  Chronic pain syndrome    2  Uncomplicated opioid dependence ([address] )    3  Long-term current use of opiate analgesic    4  Pain in left hip    5   S/P hip replacement, left        Plan:  Orders Placed This Encounter   Procedures    MM ALL_Prescribed Meds and Special Instructions    MM DT_Alprazolam Definitive Test    MM DT_Amphetamine Definitive Test    MM DT_Aripiprazole Definitive Test    MM DT_Bath Salts Definitive Test    MM DT_Buprenorphine Definitive Test    MM DT_Butalbital Definitive Test    MM DT_Clonazepam Definitive Test    MM DT_Clozapine Definitive Test    MM DT_Cocaine Definitive Test    MM DT_Codeine Definitive Test    MM DT_Desipramine Definitive Test    MM DT_Dextromethorphan Definitive Test    MM Diazepam Definitive Test    MM DT_Ethyl Glucuronide/Ethyl Sulfate Definitive Test    MM DT_Fentanyl Definitive Test    MM DT_Haloperidol Definitive Test    MM DT_Heroin Definitive Test    MM DT_Hydrocodone Definitive Test    MM DT_Hydromorphone Definitive Test    MM DT_Imipramine Definitive Test    MM DT_Kratom Definitive Test    MM DT_Levorphanol Definitive Test    MM DT_MDMA Definitive Test    MM Lorazepam Definitive Test    MM DT_Meperidine Definitive Test    MM DT_Methadone Definitive Test    MM DT_Methamphetamine Definitive Test    MM DT_Methylphenidate Definitive Test    MM DT_Morphine Definitive Test    MM DT_Oxazepam Definitive Test    MM DT_Oxycodone Definitive Test    MM DT_Oxymorphone Definitive Test    MM DT_Phencyclidine Definitive Test    MM DT_Phenobarbital Definitive Test    MM DT_Phentermine Definitive Test    MM DT_Secobarbital Definitive Test    MM DT_Spice Definitive Test    MM DT_Tapentadol Definitive Test    MM DT_Temazapam Definitive Test    MM DT_THC Definitive Test    MM DT_Tramadol Definitive Test    MM DT_Validity Creatinine    MM DT_Validity pH    MM DT_Validity Specific    MM DT_Validity Oxidant       New Medications Ordered This Visit   Medications    HYDROcodone-acetaminophen (NORCO) 7 5-325 mg per tablet     Sig: Take 1 tablet by mouth 3 (three) times a day as needed for painMax Daily Amount: 3 tablets     Dispense:  90 tablet     Refill:  0    HYDROcodone-acetaminophen (NORCO) 7 5-325 mg per tablet     Sig: Take 1 tablet by mouth 3 (three) times a day as needed for painMax Daily Amount: 3 tablets     Dispense:  90 tablet     Refill:  0       My impressions and treatment recommendations were discussed in detail with the patient who verbalized understanding and had no further questions  Given that the patient reports overall reduced pain and improved level functioning without significant side effects, I felt a reasonable to continue the patient on hydrocodone/acetaminophen 7 5/325 mg 1 tablet 3 times daily as needed for pain  I sent E prescriptions to the pharmacy dated August 3, 2021 and September 2, 2021  The risks and side effects of chronic opioid treatment were discussed in detail with the patient  Side effects include but are not limited to nausea, vomiting, GI intolerance, sedation, constipation, mental clouding, opioid-induced hyperalgesia, endocrine dysfunction, addiction, dependence, and tolerance  The patient was asked to take his medications only as prescribed and directed, never in excess, and never for any other reason other than for pain control  The patient was also asked to keep his medications out of the reach of others and away from children, preferably in a locked drawer  The patient verbalized understanding and wished to use these opioid medications  A urine drug test was collected at today's office visit as part of our medication management protocol  The point of care testing results were appropriate for what was being prescribed  The specimen will be sent for confirmatory testing   The drug screen is medically necessary because the patient is either dependent on opioid medication or is being considered for opioid medication therapy and the results could impact ongoing or future treatment  The drug screen is to evaluate for the presence or absence of prescribed, non-prescribed, and/or illicit drugs and substances  [place] Prescription Drug Monitoring Program report was reviewed and was appropriate     Follow-up is planned in 4 weeks time or sooner as warranted  Discharge instructions were provided  I personally saw and examined the patient and I agree with the above discussed plan of care  History of Present Illness:    Orlena Olszewski is a 53 y o  female who presents to [facility] for interval re-evaluation of the above stated pain complaints  The patient has a past medical and chronic pain history as outlined in the assessment section  She was last seen on Caitlyn 3, 2021  At today's office visit, the patient's pain score is 8/10 on the verbal numerical pain rating scale  The patient states that her pain is primarily on the left hip region  She describes her pain as worse in the morning, evening, and night  Her pain is constant in nature  She reports the quality of the pain as sharp, throbbing, and shooting  She is reporting that her symptoms are primarily in the left hip and nonradiating in nature  She would like to continue her medications as prescribed since that her very helpful  She denies any opioid induced constipation at this time  Other than as stated above, the patient denies any interval changes in medications, medical condition, mental condition, symptoms, or allergies since the last office visit  Review of Systems:    Review of Systems   Respiratory: Negative for shortness of breath  Cardiovascular: Negative for chest pain  Gastrointestinal: Negative for constipation, diarrhea, nausea and vomiting  Musculoskeletal: Positive for gait problem  Negative for arthralgias, joint swelling and myalgias  Decreased ROM  Joint stiffness  Pain in groin   Skin: Negative for rash  Neurological: Negative for dizziness, seizures and weakness  All other systems reviewed and are negative  Patient Active Problem List   Diagnosis    Anxiety    Poison ivy    S/P hip replacement, left    Chronic pain syndrome    Pain in left hip    Pain in both feet    Anxiety and depression    Heel pain, bilateral    Foot arch pain    Encounter for screening mammogram for breast cancer    Fatigue    Abnormal glucose    BMI 31 0-31 9,adult    Herpes zoster without complication       Past Medical History:   Diagnosis Date    Chronic hip pain, left        Past Surgical History:   Procedure Laterality Date    ANKLE ARTHROPLASTY      BREAST LUMPECTOMY      BREAST SURGERY Bilateral     Enlargement    EPIDURAL BLOCK INJECTION Left 3/5/2021    Procedure: left L5 transforaminal epidural steroid injection ( 66628); Surgeon: Isaac Devries MD;  Location: [facility] MAIN OR;  Service: Pain Management     LAPAROSCOPY FOR ECTOPIC PREGNANCY      And Salpingectomy    STEROID INJECTION HIP Left 4/16/2021    Procedure: Sacroiliac Joint Injection (03903);   Surgeon: Isaac Devries MD;  Location: [facility] MAIN OR;  Service: Pain Management     TOTAL HIP ARTHROPLASTY Left 09/2018       Family History   Problem Relation Age of Onset    Arthritis Mother     Emphysema Mother     Breast cancer Maternal Aunt     No Known Problems Father     No Known Problems Sister     No Known Problems Brother     No Known Problems Maternal Uncle     No Known Problems Paternal Aunt     No Known Problems Paternal Uncle     No Known Problems Maternal Grandmother     No Known Problems Maternal Grandfather     No Known Problems Paternal Grandmother     No Known Problems Paternal Grandfather        Social History     Occupational History    Not on file   Tobacco Use    Smoking status: Never Smoker    Smokeless tobacco: Never Used    Tobacco comment: Former smoker, per allscripts   Vaping Use    Vaping Use: Never used   Substance and Sexual Activity    Alcohol use: No     Comment: social drinker, per allscripts    Drug use: No    Sexual activity: Not on file         Current Outpatient Medications:     escitalopram (LEXAPRO) 10 mg tablet, take 1 tablet by mouth once daily, Disp: 30 tablet, Rfl: 3    gabapentin (NEURONTIN) 100 mg capsule, Take 1 capsule (100 mg total) by mouth 3 (three) times a day, Disp: 90 capsule, Rfl: 0    [START ON 8/3/2021] HYDROcodone-acetaminophen (NORCO) 7 5-325 mg per tablet, Take 1 tablet by mouth 3 (three) times a day as needed for painMax Daily Amount: 3 tablets, Disp: 90 tablet, Rfl: 0    [START ON 9/2/2021] HYDROcodone-acetaminophen (NORCO) 7 5-325 mg per tablet, Take 1 tablet by mouth 3 (three) times a day as needed for painMax Daily Amount: 3 tablets, Disp: 90 tablet, Rfl: 0    lidocaine (XYLOCAINE) 5 % ointment, Apply topically as needed for mild pain (shingels pain), Disp: 35 44 g, Rfl: 0    meloxicam (MOBIC) 7 5 mg tablet, Take 1 tablet (7 5 mg total) by mouth daily for 10 days, Disp: 10 tablet, Rfl: 0    naloxone (NARCAN) 4 mg/0 1 mL nasal spray, Administer 1 spray into a nostril   If no response after 2-3 minutes, give another dose in the other nostril using a new spray , Disp: 1 each, Rfl: 1    valACYclovir (VALTREX) 1,000 mg tablet, Take 1 tablet (1,000 mg total) by mouth 3 (three) times a day for 7 days, Disp: 21 tablet, Rfl: 0    Allergies   Allergen Reactions    Peach [Prunus Persica] Other (See Comments)     Closure of throat with peaches, apricots,plums , nectarine ,and almonds       Physical Exam:    /79   Pulse 88   Ht 5' 2" (1 575 m)   Wt 77 6 kg (171 lb)   BMI 31 28 kg/m²     Constitutional:obese  Eyes:anicteric  HEENT:grossly intact  Neck:supple, symmetric, trachea midline and no masses   Pulmonary:even and unlabored  Cardiovascular:No edema or pitting edema present  Skin:Normal without rashes or lesions and well hydrated  Psychiatric:Mood and affect appropriate  Neurologic:Cranial Nerves II-XII grossly intact  Musculoskeletal:normal    Orders Placed This Encounter   Procedures    MM ALL_Prescribed Meds and Special Instructions    MM DT_Alprazolam Definitive Test    MM DT_Amphetamine Definitive Test    MM DT_Aripiprazole Definitive Test    MM DT_Bath Salts Definitive Test    MM DT_Buprenorphine Definitive Test    MM DT_Butalbital Definitive Test    MM DT_Clonazepam Definitive Test    MM DT_Clozapine Definitive Test    MM DT_Cocaine Definitive Test    MM DT_Codeine Definitive Test    MM DT_Desipramine Definitive Test    MM DT_Dextromethorphan Definitive Test    MM Diazepam Definitive Test    MM DT_Ethyl Glucuronide/Ethyl Sulfate Definitive Test    MM DT_Fentanyl Definitive Test    MM DT_Haloperidol Definitive Test    MM DT_Heroin Definitive Test    MM DT_Hydrocodone Definitive Test    MM DT_Hydromorphone Definitive Test    MM DT_Imipramine Definitive Test    MM DT_Kratom Definitive Test    MM DT_Levorphanol Definitive Test    MM DT_MDMA Definitive Test    MM Lorazepam Definitive Test    MM DT_Meperidine Definitive Test    MM DT_Methadone Definitive Test    MM DT_Methamphetamine Definitive Test    MM DT_Methylphenidate Definitive Test    MM DT_Morphine Definitive Test    MM DT_Oxazepam Definitive Test    MM DT_Oxycodone Definitive Test    MM DT_Oxymorphone Definitive Test    MM DT_Phencyclidine Definitive Test    MM DT_Phenobarbital Definitive Test    MM DT_Phentermine Definitive Test    MM DT_Secobarbital Definitive Test    MM DT_Spice Definitive Test    MM DT_Tapentadol Definitive Test    MM DT_Temazapam Definitive Test    MM DT_THC Definitive Test    MM DT_Tramadol Definitive Test    MM DT_Validity Creatinine    MM DT_Validity pH    MM DT_Validity Specific    MM DT_Validity Oxidant

## 2021-08-05 LAB
6MAM UR QL CFM: NEGATIVE NG/ML
7AMINOCLONAZEPAM UR QL CFM: NEGATIVE NG/ML
A-OH ALPRAZ UR QL CFM: NEGATIVE NG/ML
ACCEPTABLE CREAT UR QL: NORMAL MG/DL
ACCEPTIBLE SP GR UR QL: NORMAL
AMPHET UR QL CFM: NEGATIVE NG/ML
AMPHET UR QL CFM: NEGATIVE NG/ML
BUPRENORPHINE UR QL CFM: NEGATIVE NG/ML
BUTALBITAL UR QL CFM: NEGATIVE NG/ML
BZE UR QL CFM: NEGATIVE NG/ML
CODEINE UR QL CFM: NEGATIVE NG/ML
DESIPRAMINE UR QL CFM: NEGATIVE NG/ML
DESIPRAMINE UR QL CFM: NEGATIVE NG/ML
EDDP UR QL CFM: NEGATIVE NG/ML
ETHYL GLUCURONIDE UR QL CFM: NEGATIVE NG/ML
ETHYL SULFATE UR QL SCN: ABNORMAL NG/ML
FENTANYL UR QL CFM: NEGATIVE NG/ML
GLIADIN IGG SER IA-ACNC: NEGATIVE NG/ML
GLUCOSE 30M P 50 G LAC PO SERPL-MCNC: NEGATIVE NG/ML
HYDROCODONE UR QL CFM: NEGATIVE NG/ML
HYDROCODONE UR QL CFM: NEGATIVE NG/ML
HYDROMORPHONE UR QL CFM: NORMAL NG/ML
IMIPRAMINE UR QL CFM: NEGATIVE NG/ML
LORAZEPAM UR QL CFM: NEGATIVE NG/ML
MDMA UR QL CFM: NEGATIVE NG/ML
ME-PHENIDATE UR QL CFM: NEGATIVE NG/ML
MEPERIDINE UR QL CFM: NEGATIVE NG/ML
MEPHEDRONE UR QL CFM: NEGATIVE NG/ML
METHADONE UR QL CFM: NEGATIVE NG/ML
METHAMPHET UR QL CFM: NEGATIVE NG/ML
MORPHINE UR QL CFM: NEGATIVE NG/ML
MORPHINE UR QL CFM: NEGATIVE NG/ML
NITRITE UR QL: NORMAL UG/ML
NORBUPRENORPHINE UR QL CFM: NEGATIVE NG/ML
NORDIAZEPAM UR QL CFM: NEGATIVE NG/ML
NORFENTANYL UR QL CFM: NEGATIVE NG/ML
NORHYDROCODONE UR QL CFM: NORMAL NG/ML
NORHYDROCODONE UR QL CFM: NORMAL NG/ML
NORMEPERIDINE UR QL CFM: NEGATIVE NG/ML
NOROXYCODONE UR QL CFM: NEGATIVE NG/ML
OPC-3373 QUANTIFICATION: NEGATIVE
OXAZEPAM UR QL CFM: NEGATIVE NG/ML
OXYCODONE UR QL CFM: NEGATIVE NG/ML
OXYMORPHONE UR QL CFM: NEGATIVE NG/ML
OXYMORPHONE UR QL CFM: NEGATIVE NG/ML
PCP UR QL CFM: NEGATIVE NG/ML
PHENOBARB UR QL CFM: NEGATIVE NG/ML
RESULT ALL_PRESCRIBED MEDS AND SPECIAL INSTRUCTIONS: NORMAL
SECOBARBITAL UR QL CFM: NEGATIVE NG/ML
SL AMB 3-METHYL-FENTANYL QUANTIFICATION: NORMAL NG/ML
SL AMB 4-ANPP QUANTIFICATION: NORMAL NG/ML
SL AMB 4-FIBF QUANTIFICATION: NORMAL NG/ML
SL AMB 5F-ADB-M7 METABOLITE QUANTIFICATION: NEGATIVE NG/ML
SL AMB 7-OH-MITRAGYNINE (KRATOM ALKALOID) QUANTIFICATION: NEGATIVE NG/ML
SL AMB AB-FUBINACA-M3 METABOLITE QUANTIFICATION: NEGATIVE NG/ML
SL AMB ACETYL FENTANYL QUANTIFICATION: NORMAL NG/ML
SL AMB ACETYL NORFENTANYL QUANTIFICATION: NORMAL NG/ML
SL AMB ACRYL FENTANYL QUANTIFICATION: NORMAL NG/ML
SL AMB BATH SALTS: NEGATIVE NG/ML
SL AMB BUTRYL FENTANYL QUANTIFICATION: NORMAL NG/ML
SL AMB CARFENTANIL QUANTIFICATION: NORMAL NG/ML
SL AMB CLOZAPINE QUANTIFICATION: NEGATIVE NG/ML
SL AMB CTHC (MARIJUANA METABOLITE) QUANTIFICATION: ABNORMAL NG/ML
SL AMB CYCLOPROPYL FENTANYL QUANTIFICATION: NORMAL NG/ML
SL AMB DEXTROMETHORPHAN QUANTIFICATION: NEGATIVE NG/ML
SL AMB DEXTRORPHAN (DEXTROMETHORPHAN METABOLITE) QUANT: NEGATIVE NG/ML
SL AMB DEXTRORPHAN (DEXTROMETHORPHAN METABOLITE) QUANT: NEGATIVE NG/ML
SL AMB FURANYL FENTANYL QUANTIFICATION: NORMAL NG/ML
SL AMB HALOPERIDOL  QUANTIFICATION: NEGATIVE NG/ML
SL AMB HALOPERIDOL METABOLITE QUANTIFICATION: NEGATIVE NG/ML
SL AMB JWH018 METABOLITE QUANTIFICATION: NEGATIVE NG/ML
SL AMB JWH073 METABOLITE QUANTIFICATION: NEGATIVE NG/ML
SL AMB MDMB-FUBINACA-M1 METABOLITE QUANTIFICATION: NEGATIVE NG/ML
SL AMB METHOXYACETYL FENTANYL QUANTIFICATION: NORMAL NG/ML
SL AMB METHYLONE QUANTIFICATION: NEGATIVE NG/ML
SL AMB N-DESMETHYL U-47700 QUANTIFICATION: NORMAL NG/ML
SL AMB N-DESMETHYL-TRAMADOL QUANTIFICATION: NEGATIVE NG/ML
SL AMB N-DESMETHYLCLOZAPINE QUANTIFICATION: NEGATIVE NG/ML
SL AMB PHENTERMINE QUANTIFICATION: NEGATIVE NG/ML
SL AMB RCS4 METABOLITE QUANTIFICATION: NEGATIVE NG/ML
SL AMB RITALINIC ACID QUANTIFICATION: NEGATIVE NG/ML
SL AMB U-47700 QUANTIFICATION: NORMAL NG/ML
SPECIMEN DRAWN SERPL: NEGATIVE NG/ML
SPECIMEN PH ACCEPTABLE UR: NORMAL
TAPENTADOL UR QL CFM: NEGATIVE NG/ML
TEMAZEPAM UR QL CFM: NEGATIVE NG/ML
TEMAZEPAM UR QL CFM: NEGATIVE NG/ML
TRAMADOL UR QL CFM: NEGATIVE NG/ML
URATE/CREAT 24H UR: NEGATIVE NG/ML

## 2021-08-16 ENCOUNTER — TELEPHONE (OUTPATIENT)
Dept: PAIN MEDICINE | Facility: CLINIC | Age: 51
End: 2021-08-16

## 2021-08-16 NOTE — TELEPHONE ENCOUNTER
----- Message from Billy Christopher MD sent at 8/16/2021  9:01 AM EDT -----  Regarding: UDT results  Urine drug testing from August 2, 2021 shows that she is positive for both marijuana and alcohol  Please let the patient know that this is an opioid treatment agreement violation  She should not be using marijuana or alcohol while she is being prescribed opioid medications  Please let the patient know that this is just a warning and that she should stop all marijuana and alcohol usage  Please also repeat a urine drug test at her next office visit

## 2021-08-16 NOTE — TELEPHONE ENCOUNTER
S/W pt  Advised pt of the same (except for the last sentence)  Pt stated she took 2 CDC gummies from her girlfriend for a panic attack and her girlfriend told her there was no marijuana in it  Pt verbalized understanding

## 2021-09-29 DIAGNOSIS — M72.2 PLANTAR FASCIITIS: ICD-10-CM

## 2021-09-29 DIAGNOSIS — M21.961 ACQUIRED DEFORMITY OF RIGHT FOOT: ICD-10-CM

## 2021-09-29 DIAGNOSIS — M21.962 ACQUIRED DEFORMITY OF LEFT FOOT: ICD-10-CM

## 2021-09-29 RX ORDER — MELOXICAM 7.5 MG/1
7.5 TABLET ORAL DAILY
Qty: 10 TABLET | Refills: 0 | Status: CANCELLED | OUTPATIENT
Start: 2021-09-29 | End: 2021-10-09

## 2021-09-29 RX ORDER — GABAPENTIN 100 MG/1
100 CAPSULE ORAL 3 TIMES DAILY
Qty: 90 CAPSULE | Refills: 0 | Status: CANCELLED | OUTPATIENT
Start: 2021-09-29 | End: 2021-10-29

## 2021-09-30 ENCOUNTER — OFFICE VISIT (OUTPATIENT)
Dept: PAIN MEDICINE | Facility: CLINIC | Age: 51
End: 2021-09-30
Payer: COMMERCIAL

## 2021-09-30 VITALS
DIASTOLIC BLOOD PRESSURE: 85 MMHG | BODY MASS INDEX: 29.81 KG/M2 | SYSTOLIC BLOOD PRESSURE: 126 MMHG | HEIGHT: 62 IN | HEART RATE: 72 BPM | WEIGHT: 162 LBS

## 2021-09-30 DIAGNOSIS — G89.4 CHRONIC PAIN SYNDROME: ICD-10-CM

## 2021-09-30 DIAGNOSIS — F11.20 UNCOMPLICATED OPIOID DEPENDENCE (HCC): Primary | ICD-10-CM

## 2021-09-30 DIAGNOSIS — M25.552 PAIN IN LEFT HIP: ICD-10-CM

## 2021-09-30 DIAGNOSIS — Z96.642 S/P HIP REPLACEMENT, LEFT: ICD-10-CM

## 2021-09-30 DIAGNOSIS — Z79.891 LONG-TERM CURRENT USE OF OPIATE ANALGESIC: ICD-10-CM

## 2021-09-30 PROCEDURE — 80305 DRUG TEST PRSMV DIR OPT OBS: CPT | Performed by: ANESTHESIOLOGY

## 2021-09-30 PROCEDURE — 99214 OFFICE O/P EST MOD 30 MIN: CPT | Performed by: ANESTHESIOLOGY

## 2021-09-30 RX ORDER — HYDROCODONE BITARTRATE AND ACETAMINOPHEN 7.5; 325 MG/1; MG/1
1 TABLET ORAL 3 TIMES DAILY PRN
Qty: 90 TABLET | Refills: 0 | Status: SHIPPED | OUTPATIENT
Start: 2021-10-02 | End: 2021-11-29 | Stop reason: SDUPTHER

## 2021-09-30 RX ORDER — HYDROCODONE BITARTRATE AND ACETAMINOPHEN 7.5; 325 MG/1; MG/1
1 TABLET ORAL 3 TIMES DAILY PRN
Qty: 90 TABLET | Refills: 0 | Status: SHIPPED | OUTPATIENT
Start: 2021-11-01 | End: 2021-11-29 | Stop reason: SDUPTHER

## 2021-09-30 NOTE — PROGRESS NOTES
Pain Medicine Follow-Up Note    Assessment:  1  Chronic pain syndrome    2  Pain in left hip    3  S/P hip replacement, left        Plan:  New Medications Ordered This Visit   Medications    HYDROcodone-acetaminophen (NORCO) 7 5-325 mg per tablet     Sig: Take 1 tablet by mouth 3 (three) times a day as needed for painMax Daily Amount: 3 tablets     Dispense:  90 tablet     Refill:  0    HYDROcodone-acetaminophen (NORCO) 7 5-325 mg per tablet     Sig: Take 1 tablet by mouth 3 (three) times a day as needed for painMax Daily Amount: 3 tablets     Dispense:  90 tablet     Refill:  0     My impressions and treatment recommendations were discussed in detail with the patient who verbalized understanding and had no further questions  The patient is reporting overall reduced pain and improved level of functioning without significant side effects  As such, I felt a reasonable to continue the patient on hydrocodone/acetaminophen 7 5/325 mg 1 tablet 3 times daily as needed for pain  I sent E prescriptions to the pharmacy dated October 2, 2021 and November 1, 2021  The risks and side effects of chronic opioid treatment were discussed in detail with the patient  Side effects include but are not limited to nausea, vomiting, GI intolerance, sedation, constipation, mental clouding, opioid-induced hyperalgesia, endocrine dysfunction, addiction, dependence, and tolerance  The patient was asked to take his medications only as prescribed and directed, never in excess, and never for any other reason other than for pain control  The patient was also asked to keep his medications out of the reach of others and away from children, preferably in a locked drawer  The patient verbalized understanding and wished to use these opioid medications  A urine drug test was collected at today's office visit as part of our medication management protocol  The point of care testing results were appropriate for what was being prescribed   The specimen will be sent for confirmatory testing  The drug screen is medically necessary because the patient is either dependent on opioid medication or is being considered for opioid medication therapy and the results could impact ongoing or future treatment  The drug screen is to evaluate for the presence or absence of prescribed, non-prescribed, and/or illicit drugs and substances  [place] Prescription Drug Monitoring Program report was reviewed and was appropriate     Follow-up is planned in 2 months time or sooner as warranted  Discharge instructions were provided  I personally saw and examined the patient and I agree with the above discussed plan of care  History of Present Illness:    Mitesh Gregory is a 53 y o  female who presents to [facility] for interval re-evaluation of the above stated pain complaints  The patient has a past medical and chronic pain history as outlined in the assessment section  She was last seen on August 2, 2021  At today's office visit, the patient's pain score is 7/10 on the verbal numerical pain rating scale  The patient states that her pain is worse in the morning, evening, and night  Her pain is constant in nature  She reports the quality of her pain as burning, sharp, and shooting  She is reporting 30% relief of symptoms with the combination of her medications  She denies opioid induced constipation currently  Other than as stated above, the patient denies any interval changes in medications, medical condition, mental condition, symptoms, or allergies since the last office visit  Review of Systems:    Review of Systems   Respiratory: Negative for shortness of breath  Cardiovascular: Negative for chest pain  Gastrointestinal: Negative for constipation, diarrhea, nausea and vomiting  Musculoskeletal: Positive for gait problem  Negative for arthralgias, joint swelling and myalgias          Decreased ROM  Muscle weakness  Joint stiffness  Pain in groin/hip   Skin: Negative for rash  Neurological: Negative for dizziness, seizures and weakness  All other systems reviewed and are negative  Patient Active Problem List   Diagnosis    Anxiety    Poison ivy    S/P hip replacement, left    Chronic pain syndrome    Pain in left hip    Pain in both feet    Anxiety and depression    Heel pain, bilateral    Foot arch pain    Encounter for screening mammogram for breast cancer    Fatigue    Abnormal glucose    BMI 31 0-31 9,adult    Herpes zoster without complication       Past Medical History:   Diagnosis Date    Chronic hip pain, left        Past Surgical History:   Procedure Laterality Date    ANKLE ARTHROPLASTY      BREAST LUMPECTOMY      BREAST SURGERY Bilateral     Enlargement    EPIDURAL BLOCK INJECTION Left 3/5/2021    Procedure: left L5 transforaminal epidural steroid injection ( 29709); Surgeon: Sumaya Herrera MD;  Location: [facility] MAIN OR;  Service: Pain Management     LAPAROSCOPY FOR ECTOPIC PREGNANCY      And Salpingectomy    STEROID INJECTION HIP Left 4/16/2021    Procedure: Sacroiliac Joint Injection ([id number]);   Surgeon: Sumaya Herrera MD;  Location: [facility] MAIN OR;  Service: Pain Management     TOTAL HIP ARTHROPLASTY Left 09/2018       Family History   Problem Relation Age of Onset    Arthritis Mother     Emphysema Mother     Breast cancer Maternal Aunt     No Known Problems Father     No Known Problems Sister     No Known Problems Brother     No Known Problems Maternal Uncle     No Known Problems Paternal Aunt     No Known Problems Paternal Uncle     No Known Problems Maternal Grandmother     No Known Problems Maternal Grandfather     No Known Problems Paternal Grandmother     No Known Problems Paternal Grandfather        Social History     Occupational History    Not on file   Tobacco Use    Smoking status: Never Smoker    Smokeless tobacco: Never Used    Tobacco comment: Former smoker, per allscripts   Vaping Use    Vaping Use: Never used   Substance and Sexual Activity    Alcohol use: No     Comment: social drinker, per allscripts    Drug use: No    Sexual activity: Not on file         Current Outpatient Medications:     escitalopram (LEXAPRO) 10 mg tablet, take 1 tablet by mouth once daily, Disp: 30 tablet, Rfl: 3    gabapentin (NEURONTIN) 100 mg capsule, Take 1 capsule (100 mg total) by mouth 3 (three) times a day, Disp: 90 capsule, Rfl: 0    [START ON 10/2/2021] HYDROcodone-acetaminophen (NORCO) 7 5-325 mg per tablet, Take 1 tablet by mouth 3 (three) times a day as needed for painMax Daily Amount: 3 tablets, Disp: 90 tablet, Rfl: 0    [START ON 11/1/2021] HYDROcodone-acetaminophen (NORCO) 7 5-325 mg per tablet, Take 1 tablet by mouth 3 (three) times a day as needed for painMax Daily Amount: 3 tablets, Disp: 90 tablet, Rfl: 0    lidocaine (XYLOCAINE) 5 % ointment, Apply topically as needed for mild pain (shingels pain), Disp: 35 44 g, Rfl: 0    meloxicam (MOBIC) 7 5 mg tablet, Take 1 tablet (7 5 mg total) by mouth daily for 10 days, Disp: 10 tablet, Rfl: 0    naloxone (NARCAN) 4 mg/0 1 mL nasal spray, Administer 1 spray into a nostril  If no response after 2-3 minutes, give another dose in the other nostril using a new spray , Disp: 1 each, Rfl: 1    valACYclovir (VALTREX) 1,000 mg tablet, Take 1 tablet (1,000 mg total) by mouth 3 (three) times a day for 7 days, Disp: 21 tablet, Rfl: 0    Allergies   Allergen Reactions    Peach [Prunus Persica] Other (See Comments)     Closure of throat with peaches, apricots,plums , nectarine ,and almonds       Physical Exam:    /85   Pulse 72   Ht 5' 2" (1 575 m)   Wt 73 5 kg (162 lb)   BMI 29 63 kg/m²     Constitutional:normal, well developed, well nourished, alert, in no distress and non-toxic and no overt pain behavior    Eyes:anicteric  HEENT:grossly intact  Neck:supple, symmetric, trachea midline and no masses   Pulmonary:even and unlabored  Cardiovascular:No edema or pitting edema present  Skin:Normal without rashes or lesions and well hydrated  Psychiatric:Mood and affect appropriate  Neurologic:Cranial Nerves II-XII grossly intact  Musculoskeletal:normal

## 2021-10-02 LAB
6MAM UR QL CFM: NEGATIVE NG/ML
7AMINOCLONAZEPAM UR QL CFM: NEGATIVE NG/ML
A-OH ALPRAZ UR QL CFM: NEGATIVE NG/ML
ACCEPTABLE CREAT UR QL: NORMAL MG/DL
ACCEPTIBLE SP GR UR QL: NORMAL
AMPHET UR QL CFM: NEGATIVE NG/ML
AMPHET UR QL CFM: NEGATIVE NG/ML
BUPRENORPHINE UR QL CFM: NEGATIVE NG/ML
BUTALBITAL UR QL CFM: NEGATIVE NG/ML
BZE UR QL CFM: NEGATIVE NG/ML
CODEINE UR QL CFM: NEGATIVE NG/ML
DESIPRAMINE UR QL CFM: NEGATIVE NG/ML
DESIPRAMINE UR QL CFM: NEGATIVE NG/ML
EDDP UR QL CFM: NEGATIVE NG/ML
ETHYL GLUCURONIDE UR QL CFM: NEGATIVE NG/ML
ETHYL SULFATE UR QL SCN: NEGATIVE NG/ML
FENTANYL UR QL CFM: NEGATIVE NG/ML
GLIADIN IGG SER IA-ACNC: NEGATIVE NG/ML
GLUCOSE 30M P 50 G LAC PO SERPL-MCNC: NEGATIVE NG/ML
HYDROCODONE UR QL CFM: NEGATIVE NG/ML
HYDROCODONE UR QL CFM: NEGATIVE NG/ML
HYDROMORPHONE UR QL CFM: NORMAL NG/ML
IMIPRAMINE UR QL CFM: NEGATIVE NG/ML
LORAZEPAM UR QL CFM: NEGATIVE NG/ML
MDMA UR QL CFM: NEGATIVE NG/ML
ME-PHENIDATE UR QL CFM: NEGATIVE NG/ML
MEPERIDINE UR QL CFM: NEGATIVE NG/ML
MEPHEDRONE UR QL CFM: NEGATIVE NG/ML
METHADONE UR QL CFM: NEGATIVE NG/ML
METHAMPHET UR QL CFM: NEGATIVE NG/ML
MORPHINE UR QL CFM: NEGATIVE NG/ML
MORPHINE UR QL CFM: NEGATIVE NG/ML
NITRITE UR QL: NORMAL UG/ML
NORBUPRENORPHINE UR QL CFM: NEGATIVE NG/ML
NORDIAZEPAM UR QL CFM: NEGATIVE NG/ML
NORFENTANYL UR QL CFM: NEGATIVE NG/ML
NORHYDROCODONE UR QL CFM: NORMAL NG/ML
NORHYDROCODONE UR QL CFM: NORMAL NG/ML
NORMEPERIDINE UR QL CFM: NEGATIVE NG/ML
NOROXYCODONE UR QL CFM: NEGATIVE NG/ML
OPC-3373 QUANTIFICATION: NEGATIVE
OXAZEPAM UR QL CFM: NEGATIVE NG/ML
OXYCODONE UR QL CFM: NEGATIVE NG/ML
OXYMORPHONE UR QL CFM: NEGATIVE NG/ML
OXYMORPHONE UR QL CFM: NEGATIVE NG/ML
PCP UR QL CFM: NEGATIVE NG/ML
PHENOBARB UR QL CFM: NEGATIVE NG/ML
RESULT ALL_PRESCRIBED MEDS AND SPECIAL INSTRUCTIONS: NORMAL
SECOBARBITAL UR QL CFM: NEGATIVE NG/ML
SL AMB 3-METHYL-FENTANYL QUANTIFICATION: NORMAL NG/ML
SL AMB 4-ANPP QUANTIFICATION: NORMAL NG/ML
SL AMB 4-FIBF QUANTIFICATION: NORMAL NG/ML
SL AMB 5F-ADB-M7 METABOLITE QUANTIFICATION: NEGATIVE NG/ML
SL AMB 7-OH-MITRAGYNINE (KRATOM ALKALOID) QUANTIFICATION: NEGATIVE NG/ML
SL AMB AB-FUBINACA-M3 METABOLITE QUANTIFICATION: NEGATIVE NG/ML
SL AMB ACETYL FENTANYL QUANTIFICATION: NORMAL NG/ML
SL AMB ACETYL NORFENTANYL QUANTIFICATION: NORMAL NG/ML
SL AMB ACRYL FENTANYL QUANTIFICATION: NORMAL NG/ML
SL AMB BATH SALTS: NEGATIVE NG/ML
SL AMB BUTRYL FENTANYL QUANTIFICATION: NORMAL NG/ML
SL AMB CARFENTANIL QUANTIFICATION: NORMAL NG/ML
SL AMB CLOZAPINE QUANTIFICATION: NEGATIVE NG/ML
SL AMB CTHC (MARIJUANA METABOLITE) QUANTIFICATION: ABNORMAL NG/ML
SL AMB CYCLOPROPYL FENTANYL QUANTIFICATION: NORMAL NG/ML
SL AMB DEXTROMETHORPHAN QUANTIFICATION: NEGATIVE NG/ML
SL AMB DEXTRORPHAN (DEXTROMETHORPHAN METABOLITE) QUANT: NEGATIVE NG/ML
SL AMB DEXTRORPHAN (DEXTROMETHORPHAN METABOLITE) QUANT: NEGATIVE NG/ML
SL AMB FURANYL FENTANYL QUANTIFICATION: NORMAL NG/ML
SL AMB HALOPERIDOL  QUANTIFICATION: NEGATIVE NG/ML
SL AMB HALOPERIDOL METABOLITE QUANTIFICATION: NEGATIVE NG/ML
SL AMB JWH018 METABOLITE QUANTIFICATION: NEGATIVE NG/ML
SL AMB JWH073 METABOLITE QUANTIFICATION: NEGATIVE NG/ML
SL AMB MDMB-FUBINACA-M1 METABOLITE QUANTIFICATION: NEGATIVE NG/ML
SL AMB METHOXYACETYL FENTANYL QUANTIFICATION: NORMAL NG/ML
SL AMB METHYLONE QUANTIFICATION: NEGATIVE NG/ML
SL AMB N-DESMETHYL U-47700 QUANTIFICATION: NORMAL NG/ML
SL AMB N-DESMETHYL-TRAMADOL QUANTIFICATION: NEGATIVE NG/ML
SL AMB N-DESMETHYLCLOZAPINE QUANTIFICATION: NEGATIVE NG/ML
SL AMB PHENTERMINE QUANTIFICATION: NEGATIVE NG/ML
SL AMB RCS4 METABOLITE QUANTIFICATION: NEGATIVE NG/ML
SL AMB RITALINIC ACID QUANTIFICATION: NEGATIVE NG/ML
SL AMB U-47700 QUANTIFICATION: NORMAL NG/ML
SPECIMEN DRAWN SERPL: NEGATIVE NG/ML
SPECIMEN PH ACCEPTABLE UR: NORMAL
TAPENTADOL UR QL CFM: NEGATIVE NG/ML
TEMAZEPAM UR QL CFM: NEGATIVE NG/ML
TEMAZEPAM UR QL CFM: NEGATIVE NG/ML
TRAMADOL UR QL CFM: NEGATIVE NG/ML
URATE/CREAT 24H UR: NEGATIVE NG/ML

## 2021-10-03 DIAGNOSIS — Z96.642 S/P HIP REPLACEMENT, LEFT: ICD-10-CM

## 2021-10-03 DIAGNOSIS — M21.961 ACQUIRED DEFORMITY OF RIGHT FOOT: ICD-10-CM

## 2021-10-03 DIAGNOSIS — G89.4 CHRONIC PAIN SYNDROME: ICD-10-CM

## 2021-10-03 DIAGNOSIS — M25.552 PAIN IN LEFT HIP: ICD-10-CM

## 2021-10-03 DIAGNOSIS — M21.962 ACQUIRED DEFORMITY OF LEFT FOOT: ICD-10-CM

## 2021-10-03 DIAGNOSIS — M72.2 PLANTAR FASCIITIS: ICD-10-CM

## 2021-10-03 RX ORDER — HYDROCODONE BITARTRATE AND ACETAMINOPHEN 7.5; 325 MG/1; MG/1
1 TABLET ORAL 3 TIMES DAILY PRN
Qty: 90 TABLET | Refills: 0 | Status: CANCELLED | OUTPATIENT
Start: 2021-10-03 | End: 2021-11-02

## 2021-10-06 ENCOUNTER — TELEPHONE (OUTPATIENT)
Dept: PAIN MEDICINE | Facility: CLINIC | Age: 51
End: 2021-10-06

## 2021-10-15 RX ORDER — MELOXICAM 7.5 MG/1
7.5 TABLET ORAL DAILY
Qty: 10 TABLET | Refills: 0 | Status: SHIPPED | OUTPATIENT
Start: 2021-10-15 | End: 2021-11-29 | Stop reason: DRUGHIGH

## 2021-10-15 RX ORDER — GABAPENTIN 100 MG/1
100 CAPSULE ORAL 3 TIMES DAILY
Qty: 90 CAPSULE | Refills: 0 | Status: SHIPPED | OUTPATIENT
Start: 2021-10-15 | End: 2021-11-29 | Stop reason: SDUPTHER

## 2021-10-30 LAB — RESULT ALL_PRESCRIBED MEDS AND SPECIAL INSTRUCTIONS: NORMAL

## 2021-11-29 ENCOUNTER — OFFICE VISIT (OUTPATIENT)
Dept: PAIN MEDICINE | Facility: CLINIC | Age: 51
End: 2021-11-29
Payer: COMMERCIAL

## 2021-11-29 VITALS
HEIGHT: 62 IN | HEART RATE: 79 BPM | WEIGHT: 161.6 LBS | DIASTOLIC BLOOD PRESSURE: 79 MMHG | SYSTOLIC BLOOD PRESSURE: 121 MMHG | BODY MASS INDEX: 29.74 KG/M2

## 2021-11-29 DIAGNOSIS — Z96.642 S/P HIP REPLACEMENT, LEFT: ICD-10-CM

## 2021-11-29 DIAGNOSIS — M21.961 ACQUIRED DEFORMITY OF RIGHT FOOT: ICD-10-CM

## 2021-11-29 DIAGNOSIS — M21.962 ACQUIRED DEFORMITY OF LEFT FOOT: ICD-10-CM

## 2021-11-29 DIAGNOSIS — G89.4 CHRONIC PAIN SYNDROME: Primary | ICD-10-CM

## 2021-11-29 DIAGNOSIS — M25.552 PAIN IN LEFT HIP: ICD-10-CM

## 2021-11-29 PROCEDURE — 80305 DRUG TEST PRSMV DIR OPT OBS: CPT | Performed by: NURSE PRACTITIONER

## 2021-11-29 PROCEDURE — 99214 OFFICE O/P EST MOD 30 MIN: CPT | Performed by: NURSE PRACTITIONER

## 2021-11-29 RX ORDER — MELOXICAM 7.5 MG/1
7.5 TABLET ORAL DAILY
Qty: 30 TABLET | Refills: 1 | Status: SHIPPED | OUTPATIENT
Start: 2021-11-29 | End: 2021-12-17 | Stop reason: HOSPADM

## 2021-11-29 RX ORDER — HYDROCODONE BITARTRATE AND ACETAMINOPHEN 7.5; 325 MG/1; MG/1
1 TABLET ORAL 3 TIMES DAILY PRN
Qty: 90 TABLET | Refills: 0 | Status: SHIPPED | OUTPATIENT
Start: 2021-12-05 | End: 2021-12-17 | Stop reason: HOSPADM

## 2021-11-29 RX ORDER — HYDROCODONE BITARTRATE AND ACETAMINOPHEN 7.5; 325 MG/1; MG/1
1 TABLET ORAL 3 TIMES DAILY PRN
Qty: 90 TABLET | Refills: 0 | Status: SHIPPED | OUTPATIENT
Start: 2022-01-04 | End: 2021-12-17 | Stop reason: HOSPADM

## 2021-11-29 RX ORDER — GABAPENTIN 100 MG/1
100 CAPSULE ORAL 3 TIMES DAILY
Qty: 90 CAPSULE | Refills: 1 | Status: SHIPPED | OUTPATIENT
Start: 2021-11-29 | End: 2021-12-17 | Stop reason: HOSPADM

## 2021-12-02 DIAGNOSIS — F41.9 ANXIETY AND DEPRESSION: ICD-10-CM

## 2021-12-02 DIAGNOSIS — F32.A ANXIETY AND DEPRESSION: ICD-10-CM

## 2021-12-02 RX ORDER — ESCITALOPRAM OXALATE 10 MG/1
TABLET ORAL
Qty: 30 TABLET | Refills: 3 | Status: SHIPPED | OUTPATIENT
Start: 2021-12-02 | End: 2021-12-17 | Stop reason: HOSPADM

## 2021-12-09 ENCOUNTER — TELEMEDICINE (OUTPATIENT)
Dept: FAMILY MEDICINE CLINIC | Facility: CLINIC | Age: 51
End: 2021-12-09
Payer: COMMERCIAL

## 2021-12-09 DIAGNOSIS — B34.9 VIRAL INFECTION, UNSPECIFIED: Primary | ICD-10-CM

## 2021-12-09 PROCEDURE — 0241U HB NFCT DS VIR RESP RNA 4 TRGT: CPT | Performed by: NURSE PRACTITIONER

## 2021-12-09 PROCEDURE — 99213 OFFICE O/P EST LOW 20 MIN: CPT | Performed by: NURSE PRACTITIONER

## 2021-12-10 ENCOUNTER — TELEMEDICINE (OUTPATIENT)
Dept: FAMILY MEDICINE CLINIC | Facility: CLINIC | Age: 51
End: 2021-12-10
Payer: COMMERCIAL

## 2021-12-10 DIAGNOSIS — U07.1 COVID-19: Primary | ICD-10-CM

## 2021-12-10 DIAGNOSIS — R11.0 NAUSEA: ICD-10-CM

## 2021-12-10 LAB
FLUAV RNA RESP QL NAA+PROBE: NEGATIVE
FLUBV RNA RESP QL NAA+PROBE: NEGATIVE
RSV RNA RESP QL NAA+PROBE: NEGATIVE
SARS-COV-2 RNA RESP QL NAA+PROBE: POSITIVE

## 2021-12-10 PROCEDURE — 99213 OFFICE O/P EST LOW 20 MIN: CPT | Performed by: NURSE PRACTITIONER

## 2021-12-10 RX ORDER — ONDANSETRON 4 MG/1
4 TABLET, FILM COATED ORAL EVERY 8 HOURS PRN
Qty: 20 TABLET | Refills: 0 | Status: SHIPPED | OUTPATIENT
Start: 2021-12-10 | End: 2021-12-17 | Stop reason: HOSPADM

## 2021-12-13 ENCOUNTER — HOSPITAL ENCOUNTER (INPATIENT)
Facility: HOSPITAL | Age: 51
LOS: 4 days | Discharge: HOME | DRG: 079 | End: 2021-12-17
Attending: EMERGENCY MEDICINE | Admitting: INTERNAL MEDICINE
Payer: COMMERCIAL

## 2021-12-13 ENCOUNTER — APPOINTMENT (EMERGENCY)
Dept: RADIOLOGY | Facility: HOSPITAL | Age: 51
DRG: 079 | End: 2021-12-13
Payer: COMMERCIAL

## 2021-12-13 DIAGNOSIS — R11.0 NAUSEA: ICD-10-CM

## 2021-12-13 DIAGNOSIS — U07.1 COVID-19: Primary | ICD-10-CM

## 2021-12-13 DIAGNOSIS — R09.02 HYPOXIC: ICD-10-CM

## 2021-12-13 PROBLEM — J96.01 ACUTE RESPIRATORY FAILURE WITH HYPOXIA (HCC): Status: ACTIVE | Noted: 2021-12-13

## 2021-12-13 LAB
4HR DELTA HS TROPONIN: 0 NG/L
ALBUMIN SERPL BCP-MCNC: 3.3 G/DL (ref 3.5–5)
ALP SERPL-CCNC: 57 U/L (ref 46–116)
ALT SERPL W P-5'-P-CCNC: 28 U/L (ref 12–78)
ANION GAP SERPL CALCULATED.3IONS-SCNC: 11 MMOL/L (ref 4–13)
APTT PPP: 29 SECONDS (ref 23–37)
AST SERPL W P-5'-P-CCNC: 35 U/L (ref 5–45)
ATRIAL RATE: 84 BPM
BASOPHILS # BLD AUTO: 0.01 THOUSANDS/ΜL (ref 0–0.1)
BASOPHILS NFR BLD AUTO: 0 % (ref 0–1)
BILIRUB SERPL-MCNC: 0.29 MG/DL (ref 0.2–1)
BUN SERPL-MCNC: 10 MG/DL (ref 5–25)
CALCIUM ALBUM COR SERPL-MCNC: 9.3 MG/DL (ref 8.3–10.1)
CALCIUM SERPL-MCNC: 8.7 MG/DL (ref 8.3–10.1)
CARDIAC TROPONIN I PNL SERPL HS: 3 NG/L
CARDIAC TROPONIN I PNL SERPL HS: 3 NG/L
CHLORIDE SERPL-SCNC: 100 MMOL/L (ref 100–108)
CK SERPL-CCNC: 53 U/L (ref 26–192)
CO2 SERPL-SCNC: 26 MMOL/L (ref 21–32)
CREAT SERPL-MCNC: 0.82 MG/DL (ref 0.6–1.3)
CRP SERPL QL: 15.1 MG/L
EOSINOPHIL # BLD AUTO: 0 THOUSAND/ΜL (ref 0–0.61)
EOSINOPHIL NFR BLD AUTO: 0 % (ref 0–6)
ERYTHROCYTE [DISTWIDTH] IN BLOOD BY AUTOMATED COUNT: 12.3 % (ref 11.6–15.1)
GFR SERPL CREATININE-BSD FRML MDRD: 84 ML/MIN/1.73SQ M
GLUCOSE SERPL-MCNC: 100 MG/DL (ref 65–140)
HCT VFR BLD AUTO: 43.1 % (ref 34.8–46.1)
HGB BLD-MCNC: 14 G/DL (ref 11.5–15.4)
IMM GRANULOCYTES # BLD AUTO: 0.01 THOUSAND/UL (ref 0–0.2)
IMM GRANULOCYTES NFR BLD AUTO: 0 % (ref 0–2)
INR PPP: 0.95 (ref 0.84–1.19)
LYMPHOCYTES # BLD AUTO: 0.69 THOUSANDS/ΜL (ref 0.6–4.47)
LYMPHOCYTES NFR BLD AUTO: 20 % (ref 14–44)
MCH RBC QN AUTO: 29.8 PG (ref 26.8–34.3)
MCHC RBC AUTO-ENTMCNC: 32.5 G/DL (ref 31.4–37.4)
MCV RBC AUTO: 92 FL (ref 82–98)
MONOCYTES # BLD AUTO: 0.4 THOUSAND/ΜL (ref 0.17–1.22)
MONOCYTES NFR BLD AUTO: 12 % (ref 4–12)
NEUTROPHILS # BLD AUTO: 2.27 THOUSANDS/ΜL (ref 1.85–7.62)
NEUTS SEG NFR BLD AUTO: 68 % (ref 43–75)
NRBC BLD AUTO-RTO: 0 /100 WBCS
NT-PROBNP SERPL-MCNC: 52 PG/ML
P AXIS: 60 DEGREES
PLATELET # BLD AUTO: 167 THOUSANDS/UL (ref 149–390)
PMV BLD AUTO: 10.3 FL (ref 8.9–12.7)
POTASSIUM SERPL-SCNC: 3.9 MMOL/L (ref 3.5–5.3)
PR INTERVAL: 140 MS
PROT SERPL-MCNC: 7.7 G/DL (ref 6.4–8.2)
PROTHROMBIN TIME: 12.5 SECONDS (ref 11.6–14.5)
QRS AXIS: 16 DEGREES
QRSD INTERVAL: 84 MS
QT INTERVAL: 368 MS
QTC INTERVAL: 434 MS
RBC # BLD AUTO: 4.7 MILLION/UL (ref 3.81–5.12)
SODIUM SERPL-SCNC: 137 MMOL/L (ref 136–145)
T WAVE AXIS: 39 DEGREES
VENTRICULAR RATE: 84 BPM
WBC # BLD AUTO: 3.38 THOUSAND/UL (ref 4.31–10.16)

## 2021-12-13 PROCEDURE — 85730 THROMBOPLASTIN TIME PARTIAL: CPT | Performed by: EMERGENCY MEDICINE

## 2021-12-13 PROCEDURE — 82550 ASSAY OF CK (CPK): CPT | Performed by: EMERGENCY MEDICINE

## 2021-12-13 PROCEDURE — 85610 PROTHROMBIN TIME: CPT | Performed by: EMERGENCY MEDICINE

## 2021-12-13 PROCEDURE — 99285 EMERGENCY DEPT VISIT HI MDM: CPT | Performed by: EMERGENCY MEDICINE

## 2021-12-13 PROCEDURE — 99285 EMERGENCY DEPT VISIT HI MDM: CPT

## 2021-12-13 PROCEDURE — 86140 C-REACTIVE PROTEIN: CPT | Performed by: EMERGENCY MEDICINE

## 2021-12-13 PROCEDURE — 96361 HYDRATE IV INFUSION ADD-ON: CPT

## 2021-12-13 PROCEDURE — 71045 X-RAY EXAM CHEST 1 VIEW: CPT

## 2021-12-13 PROCEDURE — 36415 COLL VENOUS BLD VENIPUNCTURE: CPT | Performed by: EMERGENCY MEDICINE

## 2021-12-13 PROCEDURE — 96374 THER/PROPH/DIAG INJ IV PUSH: CPT

## 2021-12-13 PROCEDURE — 80053 COMPREHEN METABOLIC PANEL: CPT | Performed by: EMERGENCY MEDICINE

## 2021-12-13 PROCEDURE — 83880 ASSAY OF NATRIURETIC PEPTIDE: CPT | Performed by: EMERGENCY MEDICINE

## 2021-12-13 PROCEDURE — XW033E5 INTRODUCTION OF REMDESIVIR ANTI-INFECTIVE INTO PERIPHERAL VEIN, PERCUTANEOUS APPROACH, NEW TECHNOLOGY GROUP 5: ICD-10-PCS | Performed by: STUDENT IN AN ORGANIZED HEALTH CARE EDUCATION/TRAINING PROGRAM

## 2021-12-13 PROCEDURE — 93005 ELECTROCARDIOGRAM TRACING: CPT

## 2021-12-13 PROCEDURE — 99223 1ST HOSP IP/OBS HIGH 75: CPT | Performed by: STUDENT IN AN ORGANIZED HEALTH CARE EDUCATION/TRAINING PROGRAM

## 2021-12-13 PROCEDURE — 84484 ASSAY OF TROPONIN QUANT: CPT | Performed by: EMERGENCY MEDICINE

## 2021-12-13 PROCEDURE — 93010 ELECTROCARDIOGRAM REPORT: CPT | Performed by: INTERNAL MEDICINE

## 2021-12-13 PROCEDURE — 85025 COMPLETE CBC W/AUTO DIFF WBC: CPT | Performed by: EMERGENCY MEDICINE

## 2021-12-13 RX ORDER — ONDANSETRON 2 MG/ML
4 INJECTION INTRAMUSCULAR; INTRAVENOUS EVERY 6 HOURS PRN
Status: DISCONTINUED | OUTPATIENT
Start: 2021-12-13 | End: 2021-12-17 | Stop reason: HOSPADM

## 2021-12-13 RX ORDER — LIDOCAINE 50 MG/G
2 PATCH TOPICAL DAILY
Status: DISCONTINUED | OUTPATIENT
Start: 2021-12-13 | End: 2021-12-17 | Stop reason: HOSPADM

## 2021-12-13 RX ORDER — ONDANSETRON 2 MG/ML
4 INJECTION INTRAMUSCULAR; INTRAVENOUS ONCE
Status: COMPLETED | OUTPATIENT
Start: 2021-12-13 | End: 2021-12-13

## 2021-12-13 RX ORDER — GUAIFENESIN 600 MG
600 TABLET, EXTENDED RELEASE 12 HR ORAL EVERY 12 HOURS SCHEDULED
Status: DISCONTINUED | OUTPATIENT
Start: 2021-12-13 | End: 2021-12-16

## 2021-12-13 RX ORDER — IBUPROFEN 600 MG/1
600 TABLET ORAL EVERY 6 HOURS SCHEDULED
Status: COMPLETED | OUTPATIENT
Start: 2021-12-13 | End: 2021-12-14

## 2021-12-13 RX ORDER — BENZONATATE 100 MG/1
100 CAPSULE ORAL 3 TIMES DAILY PRN
Status: DISCONTINUED | OUTPATIENT
Start: 2021-12-13 | End: 2021-12-14

## 2021-12-13 RX ORDER — ACETAMINOPHEN 325 MG/1
650 TABLET ORAL EVERY 6 HOURS PRN
Status: DISCONTINUED | OUTPATIENT
Start: 2021-12-13 | End: 2021-12-17 | Stop reason: HOSPADM

## 2021-12-13 RX ORDER — ATORVASTATIN CALCIUM 40 MG/1
40 TABLET, FILM COATED ORAL
Status: DISCONTINUED | OUTPATIENT
Start: 2021-12-13 | End: 2021-12-17 | Stop reason: HOSPADM

## 2021-12-13 RX ORDER — IBUPROFEN 600 MG/1
600 TABLET ORAL EVERY 6 HOURS PRN
Status: DISCONTINUED | OUTPATIENT
Start: 2021-12-13 | End: 2021-12-16

## 2021-12-13 RX ORDER — DEXAMETHASONE SODIUM PHOSPHATE 4 MG/ML
6 INJECTION, SOLUTION INTRA-ARTICULAR; INTRALESIONAL; INTRAMUSCULAR; INTRAVENOUS; SOFT TISSUE ONCE
Status: COMPLETED | OUTPATIENT
Start: 2021-12-13 | End: 2021-12-13

## 2021-12-13 RX ORDER — LIDOCAINE 50 MG/G
2 PATCH TOPICAL DAILY
Status: DISCONTINUED | OUTPATIENT
Start: 2021-12-14 | End: 2021-12-13

## 2021-12-13 RX ORDER — DEXAMETHASONE SODIUM PHOSPHATE 4 MG/ML
6 INJECTION, SOLUTION INTRA-ARTICULAR; INTRALESIONAL; INTRAMUSCULAR; INTRAVENOUS; SOFT TISSUE EVERY 24 HOURS
Status: DISCONTINUED | OUTPATIENT
Start: 2021-12-14 | End: 2021-12-17 | Stop reason: HOSPADM

## 2021-12-13 RX ORDER — HYDROMORPHONE HCL/PF 1 MG/ML
0.5 SYRINGE (ML) INJECTION ONCE
Status: COMPLETED | OUTPATIENT
Start: 2021-12-13 | End: 2021-12-13

## 2021-12-13 RX ADMIN — DEXAMETHASONE SODIUM PHOSPHATE 6 MG: 4 INJECTION, SOLUTION INTRA-ARTICULAR; INTRALESIONAL; INTRAMUSCULAR; INTRAVENOUS; SOFT TISSUE at 13:22

## 2021-12-13 RX ADMIN — ONDANSETRON 4 MG: 2 INJECTION INTRAMUSCULAR; INTRAVENOUS at 17:58

## 2021-12-13 RX ADMIN — SODIUM CHLORIDE 500 ML: 0.9 INJECTION, SOLUTION INTRAVENOUS at 10:37

## 2021-12-13 RX ADMIN — ATORVASTATIN CALCIUM 40 MG: 40 TABLET, FILM COATED ORAL at 21:11

## 2021-12-13 RX ADMIN — LIDOCAINE 5% 2 PATCH: 700 PATCH TOPICAL at 17:59

## 2021-12-13 RX ADMIN — IBUPROFEN 600 MG: 600 TABLET ORAL at 17:59

## 2021-12-13 RX ADMIN — REMDESIVIR 200 MG: 100 INJECTION, POWDER, LYOPHILIZED, FOR SOLUTION INTRAVENOUS at 15:19

## 2021-12-13 RX ADMIN — HYDROMORPHONE HYDROCHLORIDE 0.5 MG: 1 INJECTION, SOLUTION INTRAMUSCULAR; INTRAVENOUS; SUBCUTANEOUS at 10:36

## 2021-12-13 RX ADMIN — IBUPROFEN 600 MG: 600 TABLET ORAL at 23:27

## 2021-12-13 RX ADMIN — GUAIFENESIN 600 MG: 600 TABLET, EXTENDED RELEASE ORAL at 17:59

## 2021-12-14 LAB
ALBUMIN SERPL BCP-MCNC: 2.8 G/DL (ref 3.5–5)
ALP SERPL-CCNC: 50 U/L (ref 46–116)
ALT SERPL W P-5'-P-CCNC: 28 U/L (ref 12–78)
ANION GAP SERPL CALCULATED.3IONS-SCNC: 8 MMOL/L (ref 4–13)
AST SERPL W P-5'-P-CCNC: 25 U/L (ref 5–45)
BASOPHILS # BLD AUTO: 0.01 THOUSANDS/ΜL (ref 0–0.1)
BASOPHILS NFR BLD AUTO: 0 % (ref 0–1)
BILIRUB SERPL-MCNC: 0.24 MG/DL (ref 0.2–1)
BUN SERPL-MCNC: 23 MG/DL (ref 5–25)
CALCIUM ALBUM COR SERPL-MCNC: 9.4 MG/DL (ref 8.3–10.1)
CALCIUM SERPL-MCNC: 8.4 MG/DL (ref 8.3–10.1)
CHLORIDE SERPL-SCNC: 101 MMOL/L (ref 100–108)
CO2 SERPL-SCNC: 25 MMOL/L (ref 21–32)
CREAT SERPL-MCNC: 0.8 MG/DL (ref 0.6–1.3)
CRP SERPL QL: 15.2 MG/L
D DIMER PPP FEU-MCNC: 0.54 UG/ML FEU
EOSINOPHIL # BLD AUTO: 0 THOUSAND/ΜL (ref 0–0.61)
EOSINOPHIL NFR BLD AUTO: 0 % (ref 0–6)
ERYTHROCYTE [DISTWIDTH] IN BLOOD BY AUTOMATED COUNT: 12.4 % (ref 11.6–15.1)
GFR SERPL CREATININE-BSD FRML MDRD: 86 ML/MIN/1.73SQ M
GLUCOSE SERPL-MCNC: 93 MG/DL (ref 65–140)
HCT VFR BLD AUTO: 40.2 % (ref 34.8–46.1)
HGB BLD-MCNC: 13 G/DL (ref 11.5–15.4)
IMM GRANULOCYTES # BLD AUTO: 0.01 THOUSAND/UL (ref 0–0.2)
IMM GRANULOCYTES NFR BLD AUTO: 0 % (ref 0–2)
LYMPHOCYTES # BLD AUTO: 0.85 THOUSANDS/ΜL (ref 0.6–4.47)
LYMPHOCYTES NFR BLD AUTO: 32 % (ref 14–44)
MCH RBC QN AUTO: 29.8 PG (ref 26.8–34.3)
MCHC RBC AUTO-ENTMCNC: 32.3 G/DL (ref 31.4–37.4)
MCV RBC AUTO: 92 FL (ref 82–98)
MONOCYTES # BLD AUTO: 0.68 THOUSAND/ΜL (ref 0.17–1.22)
MONOCYTES NFR BLD AUTO: 25 % (ref 4–12)
NEUTROPHILS # BLD AUTO: 1.15 THOUSANDS/ΜL (ref 1.85–7.62)
NEUTS SEG NFR BLD AUTO: 43 % (ref 43–75)
NRBC BLD AUTO-RTO: 0 /100 WBCS
PLATELET # BLD AUTO: 175 THOUSANDS/UL (ref 149–390)
PMV BLD AUTO: 10.4 FL (ref 8.9–12.7)
POTASSIUM SERPL-SCNC: 4 MMOL/L (ref 3.5–5.3)
PROCALCITONIN SERPL-MCNC: <0.05 NG/ML
PROT SERPL-MCNC: 6.7 G/DL (ref 6.4–8.2)
RBC # BLD AUTO: 4.36 MILLION/UL (ref 3.81–5.12)
SODIUM SERPL-SCNC: 134 MMOL/L (ref 136–145)
WBC # BLD AUTO: 2.7 THOUSAND/UL (ref 4.31–10.16)

## 2021-12-14 PROCEDURE — 85379 FIBRIN DEGRADATION QUANT: CPT | Performed by: INTERNAL MEDICINE

## 2021-12-14 PROCEDURE — 85025 COMPLETE CBC W/AUTO DIFF WBC: CPT | Performed by: STUDENT IN AN ORGANIZED HEALTH CARE EDUCATION/TRAINING PROGRAM

## 2021-12-14 PROCEDURE — 86140 C-REACTIVE PROTEIN: CPT | Performed by: STUDENT IN AN ORGANIZED HEALTH CARE EDUCATION/TRAINING PROGRAM

## 2021-12-14 PROCEDURE — 84145 PROCALCITONIN (PCT): CPT | Performed by: STUDENT IN AN ORGANIZED HEALTH CARE EDUCATION/TRAINING PROGRAM

## 2021-12-14 PROCEDURE — 99232 SBSQ HOSP IP/OBS MODERATE 35: CPT | Performed by: INTERNAL MEDICINE

## 2021-12-14 PROCEDURE — 80053 COMPREHEN METABOLIC PANEL: CPT | Performed by: STUDENT IN AN ORGANIZED HEALTH CARE EDUCATION/TRAINING PROGRAM

## 2021-12-14 RX ORDER — BENZONATATE 100 MG/1
100 CAPSULE ORAL 3 TIMES DAILY
Status: DISCONTINUED | OUTPATIENT
Start: 2021-12-14 | End: 2021-12-17 | Stop reason: HOSPADM

## 2021-12-14 RX ORDER — GUAIFENESIN/DEXTROMETHORPHAN 100-10MG/5
10 SYRUP ORAL EVERY 4 HOURS PRN
Status: DISCONTINUED | OUTPATIENT
Start: 2021-12-14 | End: 2021-12-17 | Stop reason: HOSPADM

## 2021-12-14 RX ADMIN — IBUPROFEN 600 MG: 600 TABLET ORAL at 11:07

## 2021-12-14 RX ADMIN — REMDESIVIR 100 MG: 100 INJECTION, POWDER, LYOPHILIZED, FOR SOLUTION INTRAVENOUS at 14:07

## 2021-12-14 RX ADMIN — GUAIFENESIN 600 MG: 600 TABLET, EXTENDED RELEASE ORAL at 09:10

## 2021-12-14 RX ADMIN — DEXAMETHASONE SODIUM PHOSPHATE 6 MG: 4 INJECTION, SOLUTION INTRA-ARTICULAR; INTRALESIONAL; INTRAMUSCULAR; INTRAVENOUS; SOFT TISSUE at 09:10

## 2021-12-14 RX ADMIN — GUAIFENESIN 600 MG: 600 TABLET, EXTENDED RELEASE ORAL at 21:46

## 2021-12-14 RX ADMIN — IBUPROFEN 600 MG: 600 TABLET ORAL at 05:22

## 2021-12-14 RX ADMIN — ATORVASTATIN CALCIUM 40 MG: 40 TABLET, FILM COATED ORAL at 21:46

## 2021-12-14 RX ADMIN — ENOXAPARIN SODIUM 30 MG: 30 INJECTION SUBCUTANEOUS at 09:10

## 2021-12-14 RX ADMIN — LIDOCAINE 5% 2 PATCH: 700 PATCH TOPICAL at 09:11

## 2021-12-14 RX ADMIN — ENOXAPARIN SODIUM 30 MG: 30 INJECTION SUBCUTANEOUS at 21:46

## 2021-12-14 RX ADMIN — BENZONATATE 100 MG: 100 CAPSULE ORAL at 15:35

## 2021-12-14 RX ADMIN — BENZONATATE 100 MG: 100 CAPSULE ORAL at 21:46

## 2021-12-15 ENCOUNTER — TELEPHONE (OUTPATIENT)
Dept: FAMILY MEDICINE CLINIC | Facility: CLINIC | Age: 51
End: 2021-12-15

## 2021-12-15 PROBLEM — D72.819 LEUKOPENIA: Status: ACTIVE | Noted: 2021-12-15

## 2021-12-15 PROBLEM — E87.1 HYPONATREMIA: Status: RESOLVED | Noted: 2021-12-15 | Resolved: 2021-12-15

## 2021-12-15 PROBLEM — B00.1 HERPES SIMPLEX LABIALIS: Status: ACTIVE | Noted: 2021-12-15

## 2021-12-15 PROBLEM — D72.819 LEUKOPENIA: Status: RESOLVED | Noted: 2021-12-15 | Resolved: 2021-12-15

## 2021-12-15 PROBLEM — E87.1 HYPONATREMIA: Status: ACTIVE | Noted: 2021-12-15

## 2021-12-15 LAB
ALBUMIN SERPL BCP-MCNC: 3.2 G/DL (ref 3.5–5)
ALP SERPL-CCNC: 54 U/L (ref 46–116)
ALT SERPL W P-5'-P-CCNC: 31 U/L (ref 12–78)
ANION GAP SERPL CALCULATED.3IONS-SCNC: 13 MMOL/L (ref 4–13)
AST SERPL W P-5'-P-CCNC: 26 U/L (ref 5–45)
BASOPHILS # BLD AUTO: 0.01 THOUSANDS/ΜL (ref 0–0.1)
BASOPHILS NFR BLD AUTO: 0 % (ref 0–1)
BILIRUB SERPL-MCNC: 0.26 MG/DL (ref 0.2–1)
BUN SERPL-MCNC: 17 MG/DL (ref 5–25)
CALCIUM ALBUM COR SERPL-MCNC: 9.4 MG/DL (ref 8.3–10.1)
CALCIUM SERPL-MCNC: 8.8 MG/DL (ref 8.3–10.1)
CHLORIDE SERPL-SCNC: 104 MMOL/L (ref 100–108)
CO2 SERPL-SCNC: 25 MMOL/L (ref 21–32)
CREAT SERPL-MCNC: 0.79 MG/DL (ref 0.6–1.3)
CRP SERPL QL: 6.9 MG/L
D DIMER PPP FEU-MCNC: 0.71 UG/ML FEU
EOSINOPHIL # BLD AUTO: 0.01 THOUSAND/ΜL (ref 0–0.61)
EOSINOPHIL NFR BLD AUTO: 0 % (ref 0–6)
ERYTHROCYTE [DISTWIDTH] IN BLOOD BY AUTOMATED COUNT: 12.4 % (ref 11.6–15.1)
GFR SERPL CREATININE-BSD FRML MDRD: 87 ML/MIN/1.73SQ M
GLUCOSE SERPL-MCNC: 86 MG/DL (ref 65–140)
HCT VFR BLD AUTO: 41.7 % (ref 34.8–46.1)
HGB BLD-MCNC: 13.5 G/DL (ref 11.5–15.4)
IMM GRANULOCYTES # BLD AUTO: 0.02 THOUSAND/UL (ref 0–0.2)
IMM GRANULOCYTES NFR BLD AUTO: 0 % (ref 0–2)
LYMPHOCYTES # BLD AUTO: 1.4 THOUSANDS/ΜL (ref 0.6–4.47)
LYMPHOCYTES NFR BLD AUTO: 24 % (ref 14–44)
MCH RBC QN AUTO: 29.9 PG (ref 26.8–34.3)
MCHC RBC AUTO-ENTMCNC: 32.4 G/DL (ref 31.4–37.4)
MCV RBC AUTO: 92 FL (ref 82–98)
MONOCYTES # BLD AUTO: 0.69 THOUSAND/ΜL (ref 0.17–1.22)
MONOCYTES NFR BLD AUTO: 12 % (ref 4–12)
NEUTROPHILS # BLD AUTO: 3.72 THOUSANDS/ΜL (ref 1.85–7.62)
NEUTS SEG NFR BLD AUTO: 64 % (ref 43–75)
NRBC BLD AUTO-RTO: 0 /100 WBCS
PLATELET # BLD AUTO: 242 THOUSANDS/UL (ref 149–390)
PMV BLD AUTO: 10 FL (ref 8.9–12.7)
POTASSIUM SERPL-SCNC: 3.9 MMOL/L (ref 3.5–5.3)
PROCALCITONIN SERPL-MCNC: <0.05 NG/ML
PROT SERPL-MCNC: 7.3 G/DL (ref 6.4–8.2)
RBC # BLD AUTO: 4.52 MILLION/UL (ref 3.81–5.12)
SODIUM SERPL-SCNC: 142 MMOL/L (ref 136–145)
WBC # BLD AUTO: 5.85 THOUSAND/UL (ref 4.31–10.16)

## 2021-12-15 PROCEDURE — 85379 FIBRIN DEGRADATION QUANT: CPT | Performed by: INTERNAL MEDICINE

## 2021-12-15 PROCEDURE — 85025 COMPLETE CBC W/AUTO DIFF WBC: CPT | Performed by: INTERNAL MEDICINE

## 2021-12-15 PROCEDURE — 86140 C-REACTIVE PROTEIN: CPT | Performed by: INTERNAL MEDICINE

## 2021-12-15 PROCEDURE — 80053 COMPREHEN METABOLIC PANEL: CPT | Performed by: INTERNAL MEDICINE

## 2021-12-15 PROCEDURE — 99232 SBSQ HOSP IP/OBS MODERATE 35: CPT | Performed by: INTERNAL MEDICINE

## 2021-12-15 PROCEDURE — 84145 PROCALCITONIN (PCT): CPT | Performed by: STUDENT IN AN ORGANIZED HEALTH CARE EDUCATION/TRAINING PROGRAM

## 2021-12-15 RX ORDER — ECHINACEA PURPUREA EXTRACT 125 MG
1 TABLET ORAL
Status: DISCONTINUED | OUTPATIENT
Start: 2021-12-15 | End: 2021-12-17 | Stop reason: HOSPADM

## 2021-12-15 RX ORDER — VALACYCLOVIR HYDROCHLORIDE 500 MG/1
2000 TABLET, FILM COATED ORAL EVERY 12 HOURS SCHEDULED
Status: COMPLETED | OUTPATIENT
Start: 2021-12-15 | End: 2021-12-15

## 2021-12-15 RX ADMIN — LIDOCAINE 5% 2 PATCH: 700 PATCH TOPICAL at 08:32

## 2021-12-15 RX ADMIN — ATORVASTATIN CALCIUM 40 MG: 40 TABLET, FILM COATED ORAL at 21:41

## 2021-12-15 RX ADMIN — BENZONATATE 100 MG: 100 CAPSULE ORAL at 08:32

## 2021-12-15 RX ADMIN — BENZONATATE 100 MG: 100 CAPSULE ORAL at 15:57

## 2021-12-15 RX ADMIN — ACETAMINOPHEN 650 MG: 325 TABLET, FILM COATED ORAL at 08:31

## 2021-12-15 RX ADMIN — ENOXAPARIN SODIUM 30 MG: 30 INJECTION SUBCUTANEOUS at 08:31

## 2021-12-15 RX ADMIN — REMDESIVIR 100 MG: 100 INJECTION, POWDER, LYOPHILIZED, FOR SOLUTION INTRAVENOUS at 15:57

## 2021-12-15 RX ADMIN — VALACYCLOVIR HYDROCHLORIDE 2000 MG: 500 TABLET, FILM COATED ORAL at 11:55

## 2021-12-15 RX ADMIN — GUAIFENESIN AND DEXTROMETHORPHAN 10 ML: 100; 10 SYRUP ORAL at 21:40

## 2021-12-15 RX ADMIN — VALACYCLOVIR HYDROCHLORIDE 2000 MG: 500 TABLET, FILM COATED ORAL at 21:40

## 2021-12-15 RX ADMIN — GUAIFENESIN 600 MG: 600 TABLET, EXTENDED RELEASE ORAL at 21:40

## 2021-12-15 RX ADMIN — BENZONATATE 100 MG: 100 CAPSULE ORAL at 21:41

## 2021-12-15 RX ADMIN — ONDANSETRON 4 MG: 2 INJECTION INTRAMUSCULAR; INTRAVENOUS at 22:17

## 2021-12-15 RX ADMIN — ENOXAPARIN SODIUM 30 MG: 30 INJECTION SUBCUTANEOUS at 21:40

## 2021-12-15 RX ADMIN — GUAIFENESIN AND DEXTROMETHORPHAN 10 ML: 100; 10 SYRUP ORAL at 02:56

## 2021-12-15 RX ADMIN — IBUPROFEN 600 MG: 600 TABLET ORAL at 02:56

## 2021-12-15 RX ADMIN — DEXAMETHASONE SODIUM PHOSPHATE 6 MG: 4 INJECTION, SOLUTION INTRA-ARTICULAR; INTRALESIONAL; INTRAMUSCULAR; INTRAVENOUS; SOFT TISSUE at 08:32

## 2021-12-15 RX ADMIN — GUAIFENESIN 600 MG: 600 TABLET, EXTENDED RELEASE ORAL at 08:32

## 2021-12-16 PROBLEM — R11.0 NAUSEA: Status: ACTIVE | Noted: 2021-12-16

## 2021-12-16 LAB
ALBUMIN SERPL BCP-MCNC: 2.8 G/DL (ref 3.5–5)
ALP SERPL-CCNC: 55 U/L (ref 46–116)
ALT SERPL W P-5'-P-CCNC: 25 U/L (ref 12–78)
ANION GAP SERPL CALCULATED.3IONS-SCNC: 12 MMOL/L (ref 4–13)
AST SERPL W P-5'-P-CCNC: 20 U/L (ref 5–45)
BASOPHILS # BLD AUTO: 0.02 THOUSANDS/ΜL (ref 0–0.1)
BASOPHILS NFR BLD AUTO: 0 % (ref 0–1)
BILIRUB SERPL-MCNC: 0.35 MG/DL (ref 0.2–1)
BUN SERPL-MCNC: 13 MG/DL (ref 5–25)
CALCIUM ALBUM COR SERPL-MCNC: 9.5 MG/DL (ref 8.3–10.1)
CALCIUM SERPL-MCNC: 8.5 MG/DL (ref 8.3–10.1)
CHLORIDE SERPL-SCNC: 102 MMOL/L (ref 100–108)
CO2 SERPL-SCNC: 24 MMOL/L (ref 21–32)
CREAT SERPL-MCNC: 0.65 MG/DL (ref 0.6–1.3)
CRP SERPL QL: 33.7 MG/L
D DIMER PPP FEU-MCNC: 0.54 UG/ML FEU
EOSINOPHIL # BLD AUTO: 0.01 THOUSAND/ΜL (ref 0–0.61)
EOSINOPHIL NFR BLD AUTO: 0 % (ref 0–6)
ERYTHROCYTE [DISTWIDTH] IN BLOOD BY AUTOMATED COUNT: 12.3 % (ref 11.6–15.1)
GFR SERPL CREATININE-BSD FRML MDRD: 103 ML/MIN/1.73SQ M
GLUCOSE SERPL-MCNC: 95 MG/DL (ref 65–140)
HCT VFR BLD AUTO: 41.6 % (ref 34.8–46.1)
HGB BLD-MCNC: 13.5 G/DL (ref 11.5–15.4)
IMM GRANULOCYTES # BLD AUTO: 0.02 THOUSAND/UL (ref 0–0.2)
IMM GRANULOCYTES NFR BLD AUTO: 0 % (ref 0–2)
LIPASE SERPL-CCNC: 197 U/L (ref 73–393)
LYMPHOCYTES # BLD AUTO: 0.94 THOUSANDS/ΜL (ref 0.6–4.47)
LYMPHOCYTES NFR BLD AUTO: 14 % (ref 14–44)
MCH RBC QN AUTO: 29 PG (ref 26.8–34.3)
MCHC RBC AUTO-ENTMCNC: 32.5 G/DL (ref 31.4–37.4)
MCV RBC AUTO: 90 FL (ref 82–98)
MONOCYTES # BLD AUTO: 1.16 THOUSAND/ΜL (ref 0.17–1.22)
MONOCYTES NFR BLD AUTO: 17 % (ref 4–12)
NEUTROPHILS # BLD AUTO: 4.82 THOUSANDS/ΜL (ref 1.85–7.62)
NEUTS SEG NFR BLD AUTO: 69 % (ref 43–75)
NRBC BLD AUTO-RTO: 0 /100 WBCS
PLATELET # BLD AUTO: 263 THOUSANDS/UL (ref 149–390)
PMV BLD AUTO: 9.5 FL (ref 8.9–12.7)
POTASSIUM SERPL-SCNC: 4.1 MMOL/L (ref 3.5–5.3)
PROT SERPL-MCNC: 6.8 G/DL (ref 6.4–8.2)
RBC # BLD AUTO: 4.65 MILLION/UL (ref 3.81–5.12)
SODIUM SERPL-SCNC: 138 MMOL/L (ref 136–145)
WBC # BLD AUTO: 6.97 THOUSAND/UL (ref 4.31–10.16)

## 2021-12-16 PROCEDURE — 86140 C-REACTIVE PROTEIN: CPT | Performed by: INTERNAL MEDICINE

## 2021-12-16 PROCEDURE — 83690 ASSAY OF LIPASE: CPT | Performed by: INTERNAL MEDICINE

## 2021-12-16 PROCEDURE — 85025 COMPLETE CBC W/AUTO DIFF WBC: CPT | Performed by: INTERNAL MEDICINE

## 2021-12-16 PROCEDURE — 99232 SBSQ HOSP IP/OBS MODERATE 35: CPT | Performed by: INTERNAL MEDICINE

## 2021-12-16 PROCEDURE — 80053 COMPREHEN METABOLIC PANEL: CPT | Performed by: INTERNAL MEDICINE

## 2021-12-16 PROCEDURE — 85379 FIBRIN DEGRADATION QUANT: CPT | Performed by: INTERNAL MEDICINE

## 2021-12-16 PROCEDURE — 94761 N-INVAS EAR/PLS OXIMETRY MLT: CPT

## 2021-12-16 RX ORDER — MAGNESIUM HYDROXIDE/ALUMINUM HYDROXICE/SIMETHICONE 120; 1200; 1200 MG/30ML; MG/30ML; MG/30ML
30 SUSPENSION ORAL EVERY 4 HOURS PRN
Status: DISCONTINUED | OUTPATIENT
Start: 2021-12-16 | End: 2021-12-17 | Stop reason: HOSPADM

## 2021-12-16 RX ORDER — SIMETHICONE 80 MG
80 TABLET,CHEWABLE ORAL EVERY 6 HOURS PRN
Status: DISCONTINUED | OUTPATIENT
Start: 2021-12-16 | End: 2021-12-16

## 2021-12-16 RX ADMIN — FAMOTIDINE 20 MG: 10 INJECTION, SOLUTION INTRAVENOUS at 12:00

## 2021-12-16 RX ADMIN — LIDOCAINE 5% 2 PATCH: 700 PATCH TOPICAL at 09:10

## 2021-12-16 RX ADMIN — FAMOTIDINE 20 MG: 10 INJECTION, SOLUTION INTRAVENOUS at 22:50

## 2021-12-16 RX ADMIN — ENOXAPARIN SODIUM 30 MG: 30 INJECTION SUBCUTANEOUS at 20:54

## 2021-12-16 RX ADMIN — REMDESIVIR 100 MG: 100 INJECTION, POWDER, LYOPHILIZED, FOR SOLUTION INTRAVENOUS at 13:45

## 2021-12-16 RX ADMIN — SIMETHICONE 80 MG: 80 TABLET, CHEWABLE ORAL at 02:12

## 2021-12-16 RX ADMIN — BENZONATATE 100 MG: 100 CAPSULE ORAL at 15:00

## 2021-12-16 RX ADMIN — ATORVASTATIN CALCIUM 40 MG: 40 TABLET, FILM COATED ORAL at 21:01

## 2021-12-16 RX ADMIN — SIMETHICONE 80 MG: 80 TABLET, CHEWABLE ORAL at 09:10

## 2021-12-16 RX ADMIN — SALINE NASAL SPRAY 1 SPRAY: 1.5 SOLUTION NASAL at 05:38

## 2021-12-16 RX ADMIN — BENZONATATE 100 MG: 100 CAPSULE ORAL at 09:10

## 2021-12-16 RX ADMIN — GUAIFENESIN AND DEXTROMETHORPHAN 10 ML: 100; 10 SYRUP ORAL at 13:49

## 2021-12-16 RX ADMIN — ENOXAPARIN SODIUM 30 MG: 30 INJECTION SUBCUTANEOUS at 09:09

## 2021-12-16 RX ADMIN — DEXAMETHASONE SODIUM PHOSPHATE 6 MG: 4 INJECTION, SOLUTION INTRA-ARTICULAR; INTRALESIONAL; INTRAMUSCULAR; INTRAVENOUS; SOFT TISSUE at 09:10

## 2021-12-16 RX ADMIN — GUAIFENESIN 600 MG: 600 TABLET, EXTENDED RELEASE ORAL at 09:10

## 2021-12-16 RX ADMIN — ONDANSETRON 4 MG: 2 INJECTION INTRAMUSCULAR; INTRAVENOUS at 09:22

## 2021-12-16 RX ADMIN — ALUMINA, MAGNESIA, AND SIMETHICONE ORAL SUSPENSION REGULAR STRENGTH 30 ML: 1200; 1200; 120 SUSPENSION ORAL at 11:23

## 2021-12-16 RX ADMIN — BENZONATATE 100 MG: 100 CAPSULE ORAL at 20:55

## 2021-12-17 VITALS
HEIGHT: 62 IN | BODY MASS INDEX: 29.63 KG/M2 | OXYGEN SATURATION: 95 % | TEMPERATURE: 98.1 F | HEART RATE: 72 BPM | WEIGHT: 161 LBS | DIASTOLIC BLOOD PRESSURE: 84 MMHG | SYSTOLIC BLOOD PRESSURE: 108 MMHG | RESPIRATION RATE: 19 BRPM

## 2021-12-17 PROBLEM — R11.0 NAUSEA: Status: RESOLVED | Noted: 2021-12-16 | Resolved: 2021-12-17

## 2021-12-17 PROBLEM — B00.1 HERPES SIMPLEX LABIALIS: Status: RESOLVED | Noted: 2021-12-15 | Resolved: 2021-12-17

## 2021-12-17 PROBLEM — J96.01 ACUTE RESPIRATORY FAILURE WITH HYPOXIA (HCC): Status: RESOLVED | Noted: 2021-12-13 | Resolved: 2021-12-17

## 2021-12-17 LAB
ALBUMIN SERPL BCP-MCNC: 2.8 G/DL (ref 3.5–5)
ALP SERPL-CCNC: 59 U/L (ref 46–116)
ALT SERPL W P-5'-P-CCNC: 22 U/L (ref 12–78)
ANION GAP SERPL CALCULATED.3IONS-SCNC: 9 MMOL/L (ref 4–13)
AST SERPL W P-5'-P-CCNC: 17 U/L (ref 5–45)
BILIRUB SERPL-MCNC: 0.34 MG/DL (ref 0.2–1)
BUN SERPL-MCNC: 15 MG/DL (ref 5–25)
CALCIUM ALBUM COR SERPL-MCNC: 9.4 MG/DL (ref 8.3–10.1)
CALCIUM SERPL-MCNC: 8.4 MG/DL (ref 8.3–10.1)
CHLORIDE SERPL-SCNC: 103 MMOL/L (ref 100–108)
CO2 SERPL-SCNC: 28 MMOL/L (ref 21–32)
CREAT SERPL-MCNC: 0.74 MG/DL (ref 0.6–1.3)
CRP SERPL QL: 64.6 MG/L
GFR SERPL CREATININE-BSD FRML MDRD: 94 ML/MIN/1.73SQ M
GLUCOSE SERPL-MCNC: 116 MG/DL (ref 65–140)
POTASSIUM SERPL-SCNC: 4.3 MMOL/L (ref 3.5–5.3)
PROT SERPL-MCNC: 7 G/DL (ref 6.4–8.2)
SODIUM SERPL-SCNC: 140 MMOL/L (ref 136–145)

## 2021-12-17 PROCEDURE — 86140 C-REACTIVE PROTEIN: CPT | Performed by: INTERNAL MEDICINE

## 2021-12-17 PROCEDURE — 80053 COMPREHEN METABOLIC PANEL: CPT | Performed by: INTERNAL MEDICINE

## 2021-12-17 RX ORDER — FAMOTIDINE 20 MG/1
20 TABLET, FILM COATED ORAL 2 TIMES DAILY
Qty: 30 TABLET | Refills: 0 | Status: SHIPPED | OUTPATIENT
Start: 2021-12-17 | End: 2022-01-20 | Stop reason: ALTCHOICE

## 2021-12-17 RX ORDER — MAGNESIUM HYDROXIDE/ALUMINUM HYDROXICE/SIMETHICONE 120; 1200; 1200 MG/30ML; MG/30ML; MG/30ML
30 SUSPENSION ORAL EVERY 4 HOURS PRN
Qty: 355 ML | Refills: 0
Start: 2021-12-17 | End: 2022-01-20 | Stop reason: ALTCHOICE

## 2021-12-17 RX ORDER — BENZONATATE 100 MG/1
100 CAPSULE ORAL 3 TIMES DAILY PRN
Qty: 20 CAPSULE | Refills: 0 | Status: SHIPPED | OUTPATIENT
Start: 2021-12-17 | End: 2022-01-20 | Stop reason: ALTCHOICE

## 2021-12-17 RX ORDER — PREDNISONE 10 MG/1
TABLET ORAL
Qty: 15 TABLET | Refills: 0 | Status: SHIPPED | OUTPATIENT
Start: 2021-12-17 | End: 2022-01-18 | Stop reason: HOSPADM

## 2021-12-17 RX ORDER — ACETAMINOPHEN 325 MG/1
650 TABLET ORAL EVERY 6 HOURS PRN
Refills: 0
Start: 2021-12-17 | End: 2022-01-20 | Stop reason: ALTCHOICE

## 2021-12-17 RX ORDER — ONDANSETRON 4 MG/1
4 TABLET, ORALLY DISINTEGRATING ORAL EVERY 6 HOURS PRN
Qty: 10 TABLET | Refills: 0 | Status: SHIPPED | OUTPATIENT
Start: 2021-12-17

## 2021-12-17 RX ADMIN — ENOXAPARIN SODIUM 30 MG: 30 INJECTION SUBCUTANEOUS at 09:24

## 2021-12-17 RX ADMIN — REMDESIVIR 100 MG: 100 INJECTION, POWDER, LYOPHILIZED, FOR SOLUTION INTRAVENOUS at 14:36

## 2021-12-17 RX ADMIN — LIDOCAINE 5% 2 PATCH: 700 PATCH TOPICAL at 09:24

## 2021-12-17 RX ADMIN — BENZONATATE 100 MG: 100 CAPSULE ORAL at 09:24

## 2021-12-17 RX ADMIN — GUAIFENESIN AND DEXTROMETHORPHAN 10 ML: 100; 10 SYRUP ORAL at 01:00

## 2021-12-17 RX ADMIN — DEXAMETHASONE SODIUM PHOSPHATE 6 MG: 4 INJECTION, SOLUTION INTRA-ARTICULAR; INTRALESIONAL; INTRAMUSCULAR; INTRAVENOUS; SOFT TISSUE at 09:24

## 2021-12-17 RX ADMIN — FAMOTIDINE 20 MG: 10 INJECTION, SOLUTION INTRAVENOUS at 09:24

## 2021-12-17 RX ADMIN — GUAIFENESIN AND DEXTROMETHORPHAN 10 ML: 100; 10 SYRUP ORAL at 13:11

## 2021-12-20 ENCOUNTER — TRANSITIONAL CARE MANAGEMENT (OUTPATIENT)
Dept: FAMILY MEDICINE CLINIC | Facility: CLINIC | Age: 51
End: 2021-12-20

## 2021-12-27 ENCOUNTER — TELEMEDICINE (OUTPATIENT)
Dept: FAMILY MEDICINE CLINIC | Facility: CLINIC | Age: 51
End: 2021-12-27

## 2021-12-27 VITALS — HEIGHT: 62 IN | WEIGHT: 150 LBS | OXYGEN SATURATION: 97 % | BODY MASS INDEX: 27.6 KG/M2

## 2021-12-27 DIAGNOSIS — Z53.21 PROC/TRTMT NOT CRD OUT D/T PT LV BEF SEEN BY HLTH CARE PROV: Primary | ICD-10-CM

## 2021-12-27 PROCEDURE — RECHECK: Performed by: FAMILY MEDICINE

## 2022-01-04 ENCOUNTER — TELEPHONE (OUTPATIENT)
Dept: FAMILY MEDICINE CLINIC | Facility: CLINIC | Age: 52
End: 2022-01-04

## 2022-01-07 ENCOUNTER — TELEMEDICINE (OUTPATIENT)
Dept: FAMILY MEDICINE CLINIC | Facility: CLINIC | Age: 52
End: 2022-01-07
Payer: COMMERCIAL

## 2022-01-07 VITALS — OXYGEN SATURATION: 98 % | HEART RATE: 94 BPM

## 2022-01-07 DIAGNOSIS — G89.4 CHRONIC PAIN SYNDROME: Primary | ICD-10-CM

## 2022-01-07 DIAGNOSIS — U07.1 COVID-19 VIRUS INFECTION: Primary | ICD-10-CM

## 2022-01-07 PROCEDURE — 1111F DSCHRG MED/CURRENT MED MERGE: CPT | Performed by: FAMILY MEDICINE

## 2022-01-07 PROCEDURE — 99213 OFFICE O/P EST LOW 20 MIN: CPT | Performed by: FAMILY MEDICINE

## 2022-01-07 RX ORDER — HYDROCODONE BITARTRATE AND ACETAMINOPHEN 7.5; 325 MG/1; MG/1
TABLET ORAL
Qty: 54 TABLET | Refills: 0 | Status: SHIPPED | OUTPATIENT
Start: 2022-01-07 | End: 2022-01-20

## 2022-01-07 NOTE — PROGRESS NOTES
COVID-19 Outpatient Progress Note    Assessment/Plan:    Problem List Items Addressed This Visit     None         COVID-19 Plan   Encounter provider Jv Boothe MD    Provider located at [address] [place] [id number]    Recent Visits  Date Type Provider Dept   01/04/22 Telephone [facility] recent visits within past 7 days and meeting all other requirements  Future Appointments  No visits were found meeting these conditions  Showing future appointments within next 150 days and meeting all other requirements     COVID-19 HPI  Lab Results   Component Value Date    SARSCOV2 Positive (A) 12/09/2021     Past Medical History:   Diagnosis Date    Chronic hip pain, left      Past Surgical History:   Procedure Laterality Date    ANKLE ARTHROPLASTY      BREAST LUMPECTOMY      BREAST SURGERY Bilateral     Enlargement    EPIDURAL BLOCK INJECTION Left 3/5/2021    Procedure: left L5 transforaminal epidural steroid injection ( [id number]); Surgeon: Tiffany Hayward MD;  Location: [facility] MAIN OR;  Service: Pain Management     LAPAROSCOPY FOR ECTOPIC PREGNANCY      And Salpingectomy    STEROID INJECTION HIP Left 4/16/2021    Procedure: Sacroiliac Joint Injection ([id number]);   Surgeon: Tiffany Hayward MD;  Location: [facility] MAIN OR;  Service: Pain Management     TOTAL HIP ARTHROPLASTY Left 09/2018     Current Outpatient Medications   Medication Sig Dispense Refill    acetaminophen (TYLENOL) 325 mg tablet Take 2 tablets (650 mg total) by mouth every 6 (six) hours as needed for mild pain, headaches or fever (Patient not taking: Reported on 12/20/2021 )  0    aluminum-magnesium hydroxide-simethicone (MYLANTA) 200-200-20 mg/5 mL suspension Take 30 mL by mouth every 4 (four) hours as needed for indigestion or heartburn (Patient not taking: Reported on 12/20/2021 ) 355 mL 0    benzonatate (TESSALON PERLES) 100 mg capsule Take 1 capsule (100 mg total) by mouth 3 (three) times a day as needed for cough 20 capsule 0    famotidine (PEPCID) 20 mg tablet Take 1 tablet (20 mg total) by mouth 2 (two) times a day (Patient not taking: Reported on 12/20/2021 ) 30 tablet 0    ondansetron (Zofran ODT) 4 mg disintegrating tablet Take 1 tablet (4 mg total) by mouth every 6 (six) hours as needed for nausea or vomiting (Patient not taking: Reported on 12/27/2021 ) 10 tablet 0    predniSONE 10 mg tablet Take 30mg/day for 3 days then 20mg/day for 2 days then 10mg/day for 2 days (Patient not taking: Reported on 12/27/2021 ) 15 tablet 0     No current facility-administered medications for this visit  Allergies   Allergen Reactions    Peach [Prunus Persica] Other (See Comments)     Closure of throat with peaches, apricots,plums , nectarine ,and almonds       Review of Systems  Objective: There were no vitals filed for this visit  Physical Exam    VIRTUAL VISIT DISCLAIMER    Ariel Hutchinson verbally agrees to participate in [employer]  Pt is aware that [employer] could be limited without vital signs or the ability to perform a full hands-on physical [place] understands she or the provider may request at any time to terminate the video visit and request the patient to seek care or treatment in person

## 2022-01-08 ENCOUNTER — HOSPITAL ENCOUNTER (EMERGENCY)
Facility: HOSPITAL | Age: 52
Discharge: HOME | End: 2022-01-08
Attending: EMERGENCY MEDICINE | Admitting: EMERGENCY MEDICINE
Payer: COMMERCIAL

## 2022-01-08 VITALS
RESPIRATION RATE: 20 BRPM | SYSTOLIC BLOOD PRESSURE: 144 MMHG | HEART RATE: 81 BPM | DIASTOLIC BLOOD PRESSURE: 91 MMHG | OXYGEN SATURATION: 99 % | TEMPERATURE: 98.5 F

## 2022-01-08 DIAGNOSIS — S61.419A HAND LACERATION: Primary | ICD-10-CM

## 2022-01-08 PROCEDURE — 12001 RPR S/N/AX/GEN/TRNK 2.5CM/<: CPT | Performed by: PHYSICIAN ASSISTANT

## 2022-01-08 PROCEDURE — 99282 EMERGENCY DEPT VISIT SF MDM: CPT

## 2022-01-08 PROCEDURE — 99282 EMERGENCY DEPT VISIT SF MDM: CPT | Performed by: PHYSICIAN ASSISTANT

## 2022-01-08 RX ORDER — GABAPENTIN 100 MG/1
100 CAPSULE ORAL DAILY
COMMUNITY
End: 2022-01-20 | Stop reason: SDUPTHER

## 2022-01-08 RX ORDER — GINSENG 100 MG
1 CAPSULE ORAL ONCE
Status: COMPLETED | OUTPATIENT
Start: 2022-01-08 | End: 2022-01-08

## 2022-01-08 RX ORDER — MELOXICAM 7.5 MG/1
7.5 TABLET ORAL DAILY
COMMUNITY
End: 2022-01-20 | Stop reason: SDUPTHER

## 2022-01-08 RX ORDER — LIDOCAINE HYDROCHLORIDE 10 MG/ML
10 INJECTION, SOLUTION EPIDURAL; INFILTRATION; INTRACAUDAL; PERINEURAL ONCE
Status: COMPLETED | OUTPATIENT
Start: 2022-01-08 | End: 2022-01-08

## 2022-01-08 RX ADMIN — LIDOCAINE HYDROCHLORIDE 10 ML: 10 INJECTION, SOLUTION EPIDURAL; INFILTRATION; INTRACAUDAL at 12:13

## 2022-01-08 RX ADMIN — BACITRACIN 1 SMALL APPLICATION: 500 OINTMENT TOPICAL at 12:14

## 2022-01-08 NOTE — ED PROVIDER NOTES
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 Taking? HYDROcodone-acetaminophen (NORCO) 7 5-325 mg per tablet Past Week at Unknown time  No Yes   Sig: Take 1 tablet by mouth up to 3 times a day as needed for pain     acetaminophen (TYLENOL) 325 mg tablet   No No   Sig: Take 2 tablets (650 mg total) by mouth every 6 (six) hours as needed for mild pain, headaches or fever   Patient not taking: Reported on 12/20/2021    aluminum-magnesium hydroxide-simethicone (MYLANTA) 200-200-20 mg/5 mL suspension   No No   Sig: Take 30 mL by mouth every 4 (four) hours as needed for indigestion or heartburn   Patient not taking: Reported on 12/20/2021    benzonatate (TESSALON PERLES) 100 mg capsule Past Week at Unknown time  No Yes   Sig: Take 1 capsule (100 mg total) by mouth 3 (three) times a day as needed for cough   famotidine (PEPCID) 20 mg tablet   No No   Sig: Take 1 tablet (20 mg total) by mouth 2 (two) times a day   Patient not taking: Reported on 12/20/2021    gabapentin (NEURONTIN) 100 mg capsule   Yes Yes   Sig: Take 100 mg by mouth daily   meloxicam (MOBIC) 7 5 mg tablet   Yes Yes   Sig: Take 7 5 mg by mouth daily   ondansetron (Zofran ODT) 4 mg disintegrating tablet   No No   Sig: Take 1 tablet (4 mg total) by mouth every 6 (six) hours as needed for nausea or vomiting   Patient not taking: Reported on 12/27/2021    predniSONE 10 mg tablet   No No   Sig: Take 30mg/day for 3 days then 20mg/day for 2 days then 10mg/day for 2 days   Patient not taking: Reported on 12/27/2021       Facility-Administered Medications: None       Past Medical History:   Diagnosis Date    Chronic hip pain, left        Past Surgical History:   Procedure Laterality Date    ANKLE ARTHROPLASTY      BREAST LUMPECTOMY      BREAST SURGERY Bilateral     Enlargement    EPIDURAL BLOCK INJECTION Left 3/5/2021    Procedure: left L5 transforaminal epidural steroid injection ( [id number]); Surgeon: Myrna Nava MD;  Location: [facility] MAIN OR;  Service: Pain Management     LAPAROSCOPY FOR ECTOPIC PREGNANCY      And Salpingectomy    STEROID INJECTION HIP Left 4/16/2021    Procedure: Sacroiliac Joint Injection ([id number]); Surgeon: Myrna Nava MD;  Location: [facility] MAIN OR;  Service: Pain Management     TOTAL HIP ARTHROPLASTY Left 09/2018       Family History   Problem Relation Age of Onset    Arthritis Mother     Emphysema Mother     Breast cancer Maternal Aunt     No Known Problems Father     No Known Problems Sister     No Known Problems Brother     No Known Problems Maternal Uncle     No Known Problems Paternal Aunt     No Known Problems Paternal Uncle     No Known Problems Maternal Grandmother     No Known Problems Maternal Grandfather     No Known Problems Paternal Grandmother     No Known Problems Paternal Grandfather      I have reviewed and agree with the history as documented      E-Cigarette/Vaping    E-Cigarette Use Never User      E-Cigarette/Vaping Substances    Nicotine No     THC No     CBD No     Flavoring No     Other No     Unknown No      Social History     Tobacco Use    Smoking status: Never Smoker    Smokeless tobacco: Never Used    Tobacco comment: Former smoker, per allscripts   Vaping Use    Vaping Use: Never used   Substance Use Topics    Alcohol use: Never     Comment: social drinker, per allscripts  Drug use: No       Review of Systems   Constitutional: Negative for chills and fever  Respiratory: Negative for cough and shortness of breath  Cardiovascular: Negative for chest pain and palpitations  Gastrointestinal: Negative for abdominal pain, nausea and vomiting  Genitourinary: Negative for dysuria and hematuria  Musculoskeletal: Negative for arthralgias  Skin: Positive for wound  Negative for color change and rash  All other systems reviewed and are negative  Physical Exam  Physical Exam  Vitals and nursing note reviewed  Constitutional:       General: She is not in acute distress  Appearance: Normal appearance  She is not ill-appearing, toxic-appearing or diaphoretic  Cardiovascular:      Rate and Rhythm: Normal rate and regular rhythm  Pulses: Normal pulses  Heart sounds: Normal heart sounds  Pulmonary:      Effort: Pulmonary effort is normal       Breath sounds: Normal breath sounds  Abdominal:      General: Abdomen is flat  Bowel sounds are normal       Palpations: Abdomen is soft  Musculoskeletal:      Cervical back: Normal range of motion and neck supple  Comments: 2 cm dorsal left hand laceration over 2nd metacarpal   There is moderate swelling to the dorsal hand  Sensation is intact  Extension of all fingers intact with good motor strength  2+ left radial pulse  Skin:     General: Skin is warm and dry  Capillary Refill: Capillary refill takes less than 2 seconds  Neurological:      Mental Status: She is alert           Vital Signs  ED Triage Vitals [01/08/22 1159]   Temperature Pulse Respirations Blood Pressure SpO2   98 5 °F (36 9 °C) 81 20 144/91 99 %      Temp Source Heart Rate Source Patient Position - Orthostatic VS BP Location FiO2 (%)   Tympanic Monitor Sitting Right arm --      Pain Score       10 - Worst Possible Pain           Vitals:    01/08/22 1159   BP: 144/91   Pulse: 81   Patient Position - Orthostatic VS: Sitting Visual Acuity      ED Medications  Medications   lidocaine (PF) (XYLOCAINE-MPF) 1 % injection 10 mL (10 mL Infiltration Given 1/8/22 1213)   bacitracin topical ointment 1 small application (1 small application Topical Given 1/8/22 1214)       Diagnostic Studies  Results Reviewed     None                 No orders to display              Procedures  Laceration repair    Date/Time: 1/8/2022 12:41 PM  Performed by: Jose Bruce PA-C  Authorized by: Jose Bruce PA-C   Consent: Verbal consent obtained  Risks and benefits: risks, benefits and alternatives were discussed  Consent given by: patient  Patient understanding: patient states understanding of the procedure being performed  Patient consent: the patient's understanding of the procedure matches consent given  Procedure consent: procedure consent matches procedure scheduled  Relevant documents: relevant documents present and verified  Test results: test results available and properly labeled  Site marked: the operative site was marked  Radiology Images displayed and confirmed  If images not available, report reviewed: imaging studies available  Patient identity confirmed: verbally with patient and arm band  Time out: Immediately prior to procedure a "time out" was called to verify the correct patient, procedure, equipment, support staff and site/side marked as required  Body area: upper extremity  Location details: left hand  Laceration length: 2 cm  Tendon involvement: none  Nerve involvement: none  Vascular damage: no  Anesthesia: local infiltration    Anesthesia:  Local Anesthetic: lidocaine 1% without epinephrine  Anesthetic total: 2 mL    Sedation:  Patient sedated: no      Wound Dehiscence:  Superficial Wound Dehiscence: simple closure      Procedure Details:  Preparation: Patient was prepped and draped in the usual sterile fashion    Irrigation solution: saline  Irrigation method: syringe  Amount of cleaning: standard  Debridement: none  Degree of undermining: none  Skin closure: 4-0 nylon  Number of sutures: 5  Technique: simple  Approximation: close  Dressing: antibiotic ointment and pressure dressing  Patient tolerance: patient tolerated the procedure well with no immediate complications               ED Course                                             MDM  Number of Diagnoses or Management Options  Hand laceration  Diagnosis management comments: Left hand laceration with hematoma  Neurovascular intact  Wound closure with 5 by 4-0 Ethilon sutures       Amount and/or Complexity of Data Reviewed  Decide to obtain previous medical records or to obtain history from someone other than the patient: yes  Review and summarize past medical records: yes  Independent visualization of images, tracings, or specimens: yes    Risk of Complications, Morbidity, and/or Mortality  Presenting problems: low  Diagnostic procedures: low  Management options: low    Patient Progress  Patient progress: stable      Disposition  Final diagnoses:   Hand laceration     Time reflects when diagnosis was documented in both MDM as applicable and the Disposition within this note     Time User Action Codes Description Comment    1/8/2022 12:36 PM Watson Apley Add [U43 940M] Hand laceration       ED Disposition     ED Disposition Condition Date/Time Comment    Discharge Stable Sat Jan 8, 2022 12:36 PM Harvest August discharge to home/self care              Follow-up Information     Follow up With Specialties Details [phone] [id number]  [phone]            Discharge Medication List as of 1/8/2022 12:38 PM      CONTINUE these medications which have NOT CHANGED    Details   benzonatate (TESSALON PERLES) 100 mg capsule Take 1 capsule (100 mg total) by mouth 3 (three) times a day as needed for cough, Starting Fri 12/17/2021, Normal      gabapentin (NEURONTIN) 100 mg capsule Take 100 mg by mouth daily, Historical Med      HYDROcodone-acetaminophen (NORCO) 7 5-325 mg per tablet Take 1 tablet by mouth up to 3 times a day as needed for pain , Normal      meloxicam (MOBIC) 7 5 mg tablet Take 7 5 mg by mouth daily, Historical Med      acetaminophen (TYLENOL) 325 mg tablet Take 2 tablets (650 mg total) by mouth every 6 (six) hours as needed for mild pain, headaches or fever, Starting Fri 12/17/2021, No Print      aluminum-magnesium hydroxide-simethicone (MYLANTA) 200-200-20 mg/5 mL suspension Take 30 mL by mouth every 4 (four) hours as needed for indigestion or heartburn, Starting Fri 12/17/2021, No Print      famotidine (PEPCID) 20 mg tablet Take 1 tablet (20 mg total) by mouth 2 (two) times a day, Starting Fri 12/17/2021, Normal      ondansetron (Zofran ODT) 4 mg disintegrating tablet Take 1 tablet (4 mg total) by mouth every 6 (six) hours as needed for nausea or vomiting, Starting Fri 12/17/2021, Normal      predniSONE 10 mg tablet Take 30mg/day for 3 days then 20mg/day for 2 days then 10mg/day for 2 days, Normal             No discharge procedures on file      PDMP Review       Value Time User    PDMP Reviewed  Yes 1/7/2022  2:17 PM LUZ MARIA Velez          ED Provider  Electronically Signed by           Austin Tejeda PA-C  01/08/22 3609

## 2022-01-08 NOTE — DISCHARGE INSTRUCTIONS
Keep sutures dry when bathing  Check wound daily for signs of infection  Apply topical antibiotic ointment and new dressing daily  Suture removal 10 days at your primary care provider or ED  Return to the ED for signs of wound infection

## 2022-01-11 ENCOUNTER — VBI (OUTPATIENT)
Dept: FAMILY MEDICINE CLINIC | Facility: CLINIC | Age: 52
End: 2022-01-11

## 2022-01-11 NOTE — TELEPHONE ENCOUNTER
Left Message - PATIENT WENT TO THE ED ON 1/9 FINGER LACERATION PATIENT HAS 5 SUTURES - PATIENT IS ALSO COVID POSITIVE -       By Danette Butler MA

## 2022-01-13 NOTE — TELEPHONE ENCOUNTER
Salvatore Murcia    ED Visit Information     Ed visit date: 1/20/22  Diagnosis Description: laceration   In Network? Yes Jeremie Pappas  Discharge status: Home  Discharged with meds ? Yes  Number of ED visits to date: n/a   ED Severity:n/a     Outreach Information    Outreach successful: Yes 1  Date letter mailed:n/a  Date Finalized:1/13/22    Care Coordination    Follow up appointment with pcp: yes appt scheduled  Transportation issues ?  Yes    Value Consolidated Aditya

## 2022-01-18 ENCOUNTER — OFFICE VISIT (OUTPATIENT)
Dept: FAMILY MEDICINE CLINIC | Facility: CLINIC | Age: 52
End: 2022-01-18
Payer: COMMERCIAL

## 2022-01-18 VITALS
HEART RATE: 96 BPM | HEIGHT: 62 IN | DIASTOLIC BLOOD PRESSURE: 80 MMHG | BODY MASS INDEX: 29.48 KG/M2 | WEIGHT: 160.2 LBS | TEMPERATURE: 98 F | SYSTOLIC BLOOD PRESSURE: 136 MMHG | RESPIRATION RATE: 16 BRPM

## 2022-01-18 DIAGNOSIS — S61.412S LACERATION OF LEFT HAND WITHOUT FOREIGN BODY, SEQUELA: ICD-10-CM

## 2022-01-18 DIAGNOSIS — M79.89 SWELLING OF LEFT HAND: Primary | ICD-10-CM

## 2022-01-18 PROCEDURE — 99213 OFFICE O/P EST LOW 20 MIN: CPT | Performed by: FAMILY MEDICINE

## 2022-01-18 RX ORDER — AMOXICILLIN AND CLAVULANATE POTASSIUM 875; 125 MG/1; MG/1
1 TABLET, FILM COATED ORAL EVERY 12 HOURS SCHEDULED
Qty: 14 TABLET | Refills: 0 | Status: SHIPPED | OUTPATIENT
Start: 2022-01-18 | End: 2022-01-25

## 2022-01-19 NOTE — ASSESSMENT & PLAN NOTE
sxs resolved except for occ, slight , lingering cough  slw admission 12/13-12/17 following+COVID test 12/9--records reviewed  T/c future covid booster vaccination  T/c pulm rehab if cough persists  Cont rest/fluids/immun supplements

## 2022-01-19 NOTE — PROGRESS NOTES
Virtual Regular Visit    Verification of patient location:    Patient is located in the following state in which I hold an active license [place]      Assessment/Plan:    Problem List Items Addressed This Visit     COVID-19 virus infection - Primary     sxs resolved except for occ, slight , lingering cough  [facility] admission 12/13-12/17 following+COVID test 12/9--records reviewed  T/c future covid booster vaccination  T/c pulm rehab if cough persists  Cont rest/fluids/immun supplements                    Reason for visit is   Chief Complaint   Patient presents with    COVID-19    Virtual Regular Visit    Follow-up    Fatigue        Encounter provider Karli Casper MD    Provider located at [address] [place] [id number]      Recent Visits  No visits were found meeting these conditions  Showing recent visits within past 7 days and meeting all other requirements  Today's Visits  Date Type Provider Dept   01/18/22 Office Visit Karli Casper  [facility] today's visits and meeting all other requirements  Future Appointments  No visits were found meeting these conditions  Showing future appointments within next 150 days and meeting all other requirements       The patient was identified by name and date of birth  Jasmin Tian was informed that this is a telemedicine visit and that the visit is being conducted through [facility] and patient was informed that this is not a secure, HIPAA-compliant platform  She agrees to proceed     My office door was closed  No one else was in the room  She acknowledged consent and understanding of privacy and security of the video platform  The patient has agreed to participate and understands they can discontinue the visit at any time  Patient is aware this is a billable service       Subjective  Jasmin Tian is a 49 y o  female       HPI   Follow-up  Interval hx reviewed  +COVID 12/9/2021  Had been vacc w Laina Heap primary series and flu shot  Condition worsened  [facility] admission 12/13-/12/17 2/2 shortness of breath, hypoxia, nausea,abd pain/diarhea  txd w dexamethasone, remdesivir, supplemental oxygen and dvt prophylaxis  +improvement-d/c'd home on prednisone taper, no home O2 needed  cxr-no pneumonia  sxs mainly resolved, slight lingering cough, no fever/gi sx  Sig other also w covid  +inc stress --lo    Past Medical History:   Diagnosis Date    Chronic hip pain, left        Past Surgical History:   Procedure Laterality Date    ANKLE ARTHROPLASTY      BREAST LUMPECTOMY      BREAST SURGERY Bilateral     Enlargement    EPIDURAL BLOCK INJECTION Left 3/5/2021    Procedure: left L5 transforaminal epidural steroid injection ( 20341); Surgeon: Juan Isbell MD;  Location: [facility] MAIN OR;  Service: Pain Management     LAPAROSCOPY FOR ECTOPIC PREGNANCY      And Salpingectomy    STEROID INJECTION HIP Left 4/16/2021    Procedure: Sacroiliac Joint Injection (11212);   Surgeon: Juan Isbell MD;  Location: [facility] MAIN OR;  Service: Pain Management     TOTAL HIP ARTHROPLASTY Left 09/2018       Current Outpatient Medications   Medication Sig Dispense Refill    acetaminophen (TYLENOL) 325 mg tablet Take 2 tablets (650 mg total) by mouth every 6 (six) hours as needed for mild pain, headaches or fever (Patient not taking: Reported on 12/20/2021 )  0    aluminum-magnesium hydroxide-simethicone (MYLANTA) 200-200-20 mg/5 mL suspension Take 30 mL by mouth every 4 (four) hours as needed for indigestion or heartburn (Patient not taking: Reported on 12/20/2021 ) 355 mL 0    amoxicillin-clavulanate (Augmentin) 875-125 mg per tablet Take 1 tablet by mouth every 12 (twelve) hours for 7 days 14 tablet 0    benzonatate (TESSALON PERLES) 100 mg capsule Take 1 capsule (100 mg total) by mouth 3 (three) times a day as needed for cough (Patient not taking: Reported on 1/18/2022 ) 20 capsule 0    famotidine (PEPCID) 20 mg tablet Take 1 tablet (20 mg total) by mouth 2 (two) times a day (Patient not taking: Reported on 12/20/2021 ) 30 tablet 0    gabapentin (NEURONTIN) 100 mg capsule Take 100 mg by mouth daily      HYDROcodone-acetaminophen (NORCO) 7 5-325 mg per tablet Take 1 tablet by mouth up to 3 times a day as needed for pain  54 tablet 0    meloxicam (MOBIC) 7 5 mg tablet Take 7 5 mg by mouth daily      ondansetron (Zofran ODT) 4 mg disintegrating tablet Take 1 tablet (4 mg total) by mouth every 6 (six) hours as needed for nausea or vomiting 10 tablet 0     No current facility-administered medications for this visit  Allergies   Allergen Reactions    Peach [Prunus Persica] Other (See Comments)     Closure of throat with peaches, apricots,plums , nectarine ,and almonds       Review of Systems   Constitutional: Positive for fatigue  Negative for fever  Respiratory: Positive for cough (slight, lingering)  Negative for shortness of breath  Cardiovascular: Negative  Gastrointestinal: Negative  Musculoskeletal: Positive for arthralgias  Neurological: Negative  Psychiatric/Behavioral: Positive for sleep disturbance  Video Exam    Vitals:    01/07/22 0957   Pulse: 94   SpO2: 98%       Physical Exam   AAOx3  Sounds tired, otherwise NAD  I spent 20 minutes directly with the patient during this visit    VIRTUAL VISIT DISCLAIMER      Sabas Shelley verbally agrees to participate in [employer]  Pt is aware that [employer] could be limited without vital signs or the ability to perform a full hands-on physical Amador Brenda understands she or the provider may request at any time to terminate the video visit and request the patient to seek care or treatment in person

## 2022-01-19 NOTE — PROGRESS NOTES
Patient Name: Dorys Rivers     : 1970     MRN: [id number]    Assessment/Plan:    Problem List Items Addressed This Visit     None        COVID-19 PASC Plan       {Covid Time Spent:43495}     Subjective:    COVID-19 PASC HPI  Review of Systems     Patient Active Problem List   Diagnosis    Anxiety    Poison ivy    S/P hip replacement, left    Chronic pain syndrome    Pain in left hip    Pain in both feet    Anxiety and depression    Heel pain, bilateral    Foot arch pain    Encounter for screening mammogram for breast cancer    Fatigue    Abnormal glucose    BMI 31 0-31 9,adult    Herpes zoster without complication    [facility]     Social History     Tobacco Use    Smoking status: Never Smoker    Smokeless tobacco: Never Used    Tobacco comment: Former smoker, per allscripts   Vaping Use    Vaping Use: Never used   Substance Use Topics    Alcohol use: Never     Comment: social drinker, per allscripts    Drug use: No      Objective:  Pulse 94   SpO2 98%      Physical Exam    Lb Gamble MD   Virtual Regular Visit    Verification of patient location:    Patient is located in the following state in which I hold an active license {[facility] virtual patient location:[address]}      Assessment/Plan:    Problem List Items Addressed This Visit     None               Reason for visit is   Chief Complaint   Patient presents with    COVID-19    COVID-19    Virtual Regular Visit        Encounter provider Lb Gamble MD    Provider located at [address] [place] [id number]      Recent Visits  No visits were found meeting these conditions    Showing recent visits within past 7 days and meeting all other requirements  Today's Visits  Date Type Provider Dept   22 Office Visit Lb Gamble  [facility] today's visits and meeting all other requirements  Future Appointments  No visits were found meeting these conditions  Showing future appointments within next 150 days and meeting all other requirements       The patient was identified by name and date of birth  Tanja Page was informed that this is a telemedicine visit and that the visit is being conducted through {AMB VIRTUAL VISIT NCMQXK:23947}  {Telemedicine confidentiality :29265} {Telemedicine participants:49219}  She acknowledged consent and understanding of privacy and security of the video platform  The patient has agreed to participate and understands they can discontinue the visit at any time  Patient is aware this is a billable service  Subjective  Tanja Page is a 49 y o  female ***   HPI     Past Medical History:   Diagnosis Date    Chronic hip pain, left        Past Surgical History:   Procedure Laterality Date    ANKLE ARTHROPLASTY      BREAST LUMPECTOMY      BREAST SURGERY Bilateral     Enlargement    EPIDURAL BLOCK INJECTION Left 3/5/2021    Procedure: left L5 transforaminal epidural steroid injection ( 88647); Surgeon: Kourtney Mann MD;  Location: [facility] MAIN OR;  Service: Pain Management     LAPAROSCOPY FOR ECTOPIC PREGNANCY      And Salpingectomy    STEROID INJECTION HIP Left 4/16/2021    Procedure: Sacroiliac Joint Injection (82820);   Surgeon: Kourtney Mann MD;  Location: [facility] MAIN OR;  Service: Pain Management     TOTAL HIP ARTHROPLASTY Left 09/2018       Current Outpatient Medications   Medication Sig Dispense Refill    acetaminophen (TYLENOL) 325 mg tablet Take 2 tablets (650 mg total) by mouth every 6 (six) hours as needed for mild pain, headaches or fever (Patient not taking: Reported on 12/20/2021 )  0    aluminum-magnesium hydroxide-simethicone (MYLANTA) 200-200-20 mg/5 mL suspension Take 30 mL by mouth every 4 (four) hours as needed for indigestion or heartburn (Patient not taking: Reported on 12/20/2021 ) 355 mL 0    amoxicillin-clavulanate (Augmentin) 875-125 mg per tablet Take 1 tablet by mouth every 12 (twelve) hours for 7 days 14 tablet 0    benzonatate (TESSALON PERLES) 100 mg capsule Take 1 capsule (100 mg total) by mouth 3 (three) times a day as needed for cough (Patient not taking: Reported on 1/18/2022 ) 20 capsule 0    famotidine (PEPCID) 20 mg tablet Take 1 tablet (20 mg total) by mouth 2 (two) times a day (Patient not taking: Reported on 12/20/2021 ) 30 tablet 0    gabapentin (NEURONTIN) 100 mg capsule Take 100 mg by mouth daily      HYDROcodone-acetaminophen (NORCO) 7 5-325 mg per tablet Take 1 tablet by mouth up to 3 times a day as needed for pain  54 tablet 0    meloxicam (MOBIC) 7 5 mg tablet Take 7 5 mg by mouth daily      ondansetron (Zofran ODT) 4 mg disintegrating tablet Take 1 tablet (4 mg total) by mouth every 6 (six) hours as needed for nausea or vomiting 10 tablet 0     No current facility-administered medications for this visit  Allergies   Allergen Reactions    Peach [Prunus Persica] Other (See Comments)     Closure of throat with peaches, apricots,plums , nectarine ,and almonds           Video Exam    Vitals:    01/07/22 0957   Pulse: 94   SpO2: 98%       Physical Exam     {Time Spent:51506}    VIRTUAL VISIT DISCLAIMER      Michelle Rodriguez verbally agrees to participate in [employer]  Pt is aware that [employer] could be limited without vital signs or the ability to perform a full hands-on physical Bonilla Yuri understands she or the provider may request at any time to terminate the video visit and request the patient to seek care or treatment in person

## 2022-01-24 ENCOUNTER — OFFICE VISIT (OUTPATIENT)
Dept: PAIN MEDICINE | Facility: CLINIC | Age: 52
End: 2022-01-24
Payer: COMMERCIAL

## 2022-01-24 ENCOUNTER — TELEPHONE (OUTPATIENT)
Dept: PAIN MEDICINE | Facility: CLINIC | Age: 52
End: 2022-01-24

## 2022-01-24 VITALS
SYSTOLIC BLOOD PRESSURE: 131 MMHG | BODY MASS INDEX: 30 KG/M2 | HEIGHT: 62 IN | HEART RATE: 81 BPM | WEIGHT: 163 LBS | DIASTOLIC BLOOD PRESSURE: 86 MMHG

## 2022-01-24 DIAGNOSIS — Z79.891 LONG-TERM CURRENT USE OF OPIATE ANALGESIC: ICD-10-CM

## 2022-01-24 DIAGNOSIS — G89.4 CHRONIC PAIN SYNDROME: Primary | ICD-10-CM

## 2022-01-24 DIAGNOSIS — F11.20 UNCOMPLICATED OPIOID DEPENDENCE (HCC): ICD-10-CM

## 2022-01-24 PROCEDURE — 3008F BODY MASS INDEX DOCD: CPT | Performed by: FAMILY MEDICINE

## 2022-01-24 PROCEDURE — 80305 DRUG TEST PRSMV DIR OPT OBS: CPT | Performed by: NURSE PRACTITIONER

## 2022-01-24 PROCEDURE — 99214 OFFICE O/P EST MOD 30 MIN: CPT | Performed by: NURSE PRACTITIONER

## 2022-01-24 RX ORDER — HYDROCODONE BITARTRATE AND ACETAMINOPHEN 7.5; 325 MG/1; MG/1
1 TABLET ORAL EVERY 8 HOURS PRN
Qty: 90 TABLET | Refills: 0 | Status: SHIPPED | OUTPATIENT
Start: 2022-02-23 | End: 2022-03-15 | Stop reason: ALTCHOICE

## 2022-01-24 RX ORDER — HYDROCODONE BITARTRATE AND ACETAMINOPHEN 7.5; 325 MG/1; MG/1
1 TABLET ORAL EVERY 8 HOURS PRN
Qty: 90 TABLET | Refills: 0 | Status: SHIPPED | OUTPATIENT
Start: 2022-01-24 | End: 2022-02-23

## 2022-01-24 RX ORDER — MELOXICAM 7.5 MG/1
7.5 TABLET ORAL DAILY
Qty: 30 TABLET | Refills: 1 | Status: SHIPPED | OUTPATIENT
Start: 2022-01-24 | End: 2022-03-15 | Stop reason: SDUPTHER

## 2022-01-24 RX ORDER — GABAPENTIN 100 MG/1
100 CAPSULE ORAL DAILY
Qty: 30 CAPSULE | Refills: 1 | Status: SHIPPED | OUTPATIENT
Start: 2022-01-24 | End: 2022-03-15 | Stop reason: ALTCHOICE

## 2022-01-24 RX ORDER — HYDROCODONE BITARTRATE AND ACETAMINOPHEN 10; 325 MG/1; MG/1
TABLET ORAL
COMMUNITY
Start: 2020-12-03 | End: 2022-01-24 | Stop reason: ALTCHOICE

## 2022-01-24 NOTE — PROGRESS NOTES
Pain Medicine Follow-Up Note    Assessment:  1  Chronic pain syndrome    2   Uncomplicated opioid dependence ([address] )    3  Long-term current use of opiate analgesic        Plan:  Orders Placed This Encounter   Procedures    MM ALL_Prescribed Meds and Special Instructions    MM DT_Alprazolam Definitive Test    MM DT_Amphetamine Definitive Test    MM DT_Aripiprazole Definitive Test    MM DT_Bath Salts Definitive Test    MM DT_Buprenorphine Definitive Test    MM DT_Butalbital Definitive Test    MM DT_Clonazepam Definitive Test    MM DT_Clozapine Definitive Test    MM DT_Cocaine Definitive Test    MM DT_Codeine Definitive Test    MM DT_Desipramine Definitive Test    MM DT_Dextromethorphan Definitive Test    MM Diazepam Definitive Test    MM DT_Ethyl Glucuronide/Ethyl Sulfate Definitive Test    MM DT_Fentanyl Definitive Test    MM DT_Haloperidol Definitive Test    MM DT_Heroin Definitive Test    MM DT_Hydrocodone Definitive Test    MM DT_Hydromorphone Definitive Test    MM DT_Imipramine Definitive Test    MM DT_Kratom Definitive Test    MM DT_Levorphanol Definitive Test    MM DT_MDMA Definitive Test    MM Lorazepam Definitive Test    MM DT_Meperidine Definitive Test    MM DT_Methadone Definitive Test    MM DT_Methamphetamine Definitive Test    MM DT_Methylphenidate Definitive Test    MM DT_Morphine Definitive Test    MM DT_Oxazepam Definitive Test    MM DT_Oxycodone Definitive Test    MM DT_Oxymorphone Definitive Test    MM DT_Phencyclidine Definitive Test    MM DT_Phenobarbital Definitive Test    MM DT_Phentermine Definitive Test    MM DT_Secobarbital Definitive Test    MM DT_Spice Definitive Test    MM DT_Tapentadol Definitive Test    MM DT_Temazapam Definitive Test    MM DT_THC Definitive Test    MM DT_Tramadol Definitive Test    MM DT_Validity Oxidant    MM DT_Validity Creatinine    MM DT_Validity pH    MM DT_Validity Specific       New Medications Ordered This Visit Medications    HYDROcodone-acetaminophen (NORCO) 7 5-325 mg per tablet     Sig: Take 1 tablet by mouth every 8 (eight) hours as needed for pain Max Daily Amount: 3 tablets     Dispense:  90 tablet     Refill:  0    HYDROcodone-acetaminophen (NORCO) 7 5-325 mg per tablet     Sig: Take 1 tablet by mouth every 8 (eight) hours as needed for pain Max Daily Amount: 3 tablets     Dispense:  90 tablet     Refill:  0    gabapentin (NEURONTIN) 100 mg capsule     Sig: Take 1 capsule (100 mg total) by mouth daily     Dispense:  30 capsule     Refill:  1    meloxicam (MOBIC) 7 5 mg tablet     Sig: Take 1 tablet (7 5 mg total) by mouth daily     Dispense:  30 tablet     Refill:  1       My impressions and treatment recommendations were discussed in detail with the patient who verbalized understanding and had no further questions  Given that the patient reports overall reduced pain with improved level of functioning, we will continue her on Norco 7 5/325 mg up to 3 times a day as needed for her pain  She reports no side effects from this medication and states that it helps her pain symptoms by 40%  Fill dates for this medication are 01/24/2022 and 02/23/2022  We will also continue her on gabapentin 100 mg daily and meloxicam 7 5 mg daily  Patient reports no side effects from these medications and refills were sent to the pharmacy on file  If patient is experiencing significant pain at next office visit, we can discuss increasing gabapentin or meloxicam     [place] Prescription Drug Monitoring Program report was reviewed and was appropriate     A urine drug screen was collected at today's office visit as part of our medication management protocol  The point of care testing results were appropriate for what was being prescribed  The specimen will be sent for confirmatory testing   The drug screen is medically necessary because the patient is either dependent on opioid medication or is being considered for opioid medication therapy and the results could impact ongoing or future treatment  The drug screen is to evaluate for the presences or absence of prescribed, non-prescribed, and/or illicit drugs/substances  There are risks associated with opioid medications, including dependence, addiction and tolerance  The patient understands and agrees to use these medications only as prescribed  Potential side effects of the medications include, but are not limited to, constipation, drowsiness, addiction, impaired judgment and risk of fatal overdose if not taken as prescribed  The patient was warned against driving while taking sedation medications  Sharing medications is a felony  At this point in time, the patient is showing no signs of addiction, abuse, diversion or suicidal ideation  Follow-up is planned in 8 weeks time or sooner as warranted  Discharge instructions were provided  I personally saw and examined the patient and I agree with the above discussed plan of care  History of Present Illness:    Rosibel Godoy is a 49 y o  female who presents to [facility] for interval re-evaluation of the above stated pain complaints  The patient has a past medical and chronic pain history as outlined in the assessment section  She was last seen on 11/29/2021  She reports a pain score of 8/10 that is constant to some degree and worse at night  She describes the quality as dull aching, sharp and shooting  She states that the pain is in her left upper buttock and groin area and in her left knee and bilateral ankles  Pain Contract Signed: 8/2/2021  Last Urine Drug Screen:  01/24/2021    Other than as stated above, the patient denies any interval changes in medications, medical condition, mental condition, symptoms, or allergies since the last office visit  Review of Systems:    Review of Systems   Respiratory: Negative for shortness of breath  Cardiovascular: Negative for chest pain  Gastrointestinal: Negative for constipation, diarrhea, nausea and vomiting  Musculoskeletal: Positive for gait problem  Negative for arthralgias, joint swelling and myalgias  Decreased ROM  Joint stiffness  Pain in left groin   Skin: Negative for rash  Neurological: Negative for dizziness, seizures and weakness  All other systems reviewed and are negative  Patient Active Problem List   Diagnosis    Anxiety    Poison ivy    S/P hip replacement, left    Chronic pain syndrome    Pain in left hip    Pain in both feet    Anxiety and depression    Heel pain, bilateral    Foot arch pain    Encounter for screening mammogram for breast cancer    Fatigue    Abnormal glucose    BMI 31 0-31 9,adult    Herpes zoster without complication    XXWEW-74 virus infection       Past Medical History:   Diagnosis Date    Chronic hip pain, left        Past Surgical History:   Procedure Laterality Date    ANKLE ARTHROPLASTY      BREAST LUMPECTOMY      BREAST SURGERY Bilateral     Enlargement    EPIDURAL BLOCK INJECTION Left 3/5/2021    Procedure: left L5 transforaminal epidural steroid injection ( 62725); Surgeon: Lesly Varghese MD;  Location: [facility] MAIN OR;  Service: Pain Management     LAPAROSCOPY FOR ECTOPIC PREGNANCY      And Salpingectomy    STEROID INJECTION HIP Left 4/16/2021    Procedure: Sacroiliac Joint Injection (39669);   Surgeon: Lesly Varghese MD;  Location: [facility] MAIN OR;  Service: Pain Management     TOTAL HIP ARTHROPLASTY Left 09/2018       Family History   Problem Relation Age of Onset    Arthritis Mother     Emphysema Mother     Breast cancer Maternal Aunt     No Known Problems Father     No Known Problems Sister     No Known Problems Brother     No Known Problems Maternal Uncle     No Known Problems Paternal Aunt     No Known Problems Paternal Uncle     No Known Problems Maternal Grandmother     No Known Problems Maternal Grandfather     No Known Problems Paternal Grandmother     No Known Problems Paternal Grandfather        Social History     Occupational History    Not on file   Tobacco Use    Smoking status: Never Smoker    Smokeless tobacco: Never Used    Tobacco comment: Former smoker, per allscripts   Vaping Use    Vaping Use: Never used   Substance and Sexual Activity    Alcohol use: Never     Comment: social drinker, per allscripts    Drug use: No    Sexual activity: Not on file         Current Outpatient Medications:     amoxicillin-clavulanate (Augmentin) 875-125 mg per tablet, Take 1 tablet by mouth every 12 (twelve) hours for 7 days, Disp: 14 tablet, Rfl: 0    gabapentin (NEURONTIN) 100 mg capsule, Take 1 capsule (100 mg total) by mouth daily, Disp: 30 capsule, Rfl: 1    HYDROcodone-acetaminophen (NORCO) 7 5-325 mg per tablet, Take 1 tablet by mouth every 8 (eight) hours as needed for pain Max Daily Amount: 3 tablets, Disp: 90 tablet, Rfl: 0    [START ON 2/23/2022] HYDROcodone-acetaminophen (NORCO) 7 5-325 mg per tablet, Take 1 tablet by mouth every 8 (eight) hours as needed for pain Max Daily Amount: 3 tablets, Disp: 90 tablet, Rfl: 0    meloxicam (MOBIC) 7 5 mg tablet, Take 1 tablet (7 5 mg total) by mouth daily, Disp: 30 tablet, Rfl: 1    ondansetron (Zofran ODT) 4 mg disintegrating tablet, Take 1 tablet (4 mg total) by mouth every 6 (six) hours as needed for nausea or vomiting, Disp: 10 tablet, Rfl: 0    Allergies   Allergen Reactions    Peach [Prunus Persica] Other (See Comments)     Closure of throat with peaches, apricots,plums , nectarine ,and almonds       Physical Exam:    /86   Pulse 81   Ht 5' 2" (1 575 m)   Wt 73 9 kg (163 lb)   BMI 29 81 kg/m²     Constitutional:normal, well developed, well nourished, alert, in no distress and non-toxic and no overt pain behavior    Eyes:anicteric  HEENT:grossly intact  Neck:supple, symmetric, trachea midline and no masses   Pulmonary:even and unlabored  Cardiovascular:No edema or pitting edema present  Skin:Normal without rashes or lesions and well hydrated  Psychiatric:Mood and affect appropriate  Neurologic:Cranial Nerves II-XII grossly intact  Musculoskeletal:antalgic      Imaging  No orders to display         Orders Placed This Encounter   Procedures    MM ALL_Prescribed Meds and Special Instructions    MM DT_Alprazolam Definitive Test    MM DT_Amphetamine Definitive Test    MM DT_Aripiprazole Definitive Test    MM DT_Bath Salts Definitive Test    MM DT_Buprenorphine Definitive Test    MM DT_Butalbital Definitive Test    MM DT_Clonazepam Definitive Test    MM DT_Clozapine Definitive Test    MM DT_Cocaine Definitive Test    MM DT_Codeine Definitive Test    MM DT_Desipramine Definitive Test    MM DT_Dextromethorphan Definitive Test    MM Diazepam Definitive Test    MM DT_Ethyl Glucuronide/Ethyl Sulfate Definitive Test    MM DT_Fentanyl Definitive Test    MM DT_Haloperidol Definitive Test    MM DT_Heroin Definitive Test    MM DT_Hydrocodone Definitive Test    MM DT_Hydromorphone Definitive Test    MM DT_Imipramine Definitive Test    MM DT_Kratom Definitive Test    MM DT_Levorphanol Definitive Test    MM DT_MDMA Definitive Test    MM Lorazepam Definitive Test    MM DT_Meperidine Definitive Test    MM DT_Methadone Definitive Test    MM DT_Methamphetamine Definitive Test    MM DT_Methylphenidate Definitive Test    MM DT_Morphine Definitive Test    MM DT_Oxazepam Definitive Test    MM DT_Oxycodone Definitive Test    MM DT_Oxymorphone Definitive Test    MM DT_Phencyclidine Definitive Test    MM DT_Phenobarbital Definitive Test    MM DT_Phentermine Definitive Test    MM DT_Secobarbital Definitive Test    MM DT_Spice Definitive Test    MM DT_Tapentadol Definitive Test    MM DT_Temazapam Definitive Test    MM DT_THC Definitive Test    MM DT_Tramadol Definitive Test    MM DT_Validity Oxidant    MM DT_Validity Creatinine    MM DT_Validity pH    MM DT_Validity Specific

## 2022-01-25 ENCOUNTER — APPOINTMENT (OUTPATIENT)
Dept: RADIOLOGY | Facility: CLINIC | Age: 52
End: 2022-01-25
Payer: COMMERCIAL

## 2022-01-25 DIAGNOSIS — M79.89 SWELLING OF LEFT HAND: ICD-10-CM

## 2022-01-25 DIAGNOSIS — S61.412S LACERATION OF LEFT HAND WITHOUT FOREIGN BODY, SEQUELA: ICD-10-CM

## 2022-01-25 PROCEDURE — 73130 X-RAY EXAM OF HAND: CPT

## 2022-01-26 ENCOUNTER — TELEPHONE (OUTPATIENT)
Dept: FAMILY MEDICINE CLINIC | Facility: CLINIC | Age: 52
End: 2022-01-26

## 2022-01-26 NOTE — TELEPHONE ENCOUNTER
Pt given Dr London Lira # [phone] and told referral in chart relayed Dr Francisca Cramer message

## 2022-01-26 NOTE — TELEPHONE ENCOUNTER
Pt is aware and hand still hurts and she is having a hard time making a fist  She has a stress ball that she is using to help with movement

## 2022-01-26 NOTE — TELEPHONE ENCOUNTER
Please give pt info for Dr Jarrett Vyas specialist--may just need more time w healing but would like to make sure no soft tissue abscess if still w pain and swelling-referral is in chart-thanks

## 2022-01-26 NOTE — TELEPHONE ENCOUNTER
----- Message from Remy Weaver MD sent at 1/25/2022 10:35 PM EST -----  Please inform hand x-ray shows the soft tissue swelling-otherwise within normal     Please also find out -how is her hand feeling?

## 2022-01-27 LAB
6MAM UR QL CFM: NEGATIVE NG/ML
7AMINOCLONAZEPAM UR QL CFM: NEGATIVE NG/ML
A-OH ALPRAZ UR QL CFM: NEGATIVE NG/ML
ACCEPTABLE CREAT UR QL: NORMAL MG/DL
ACCEPTIBLE SP GR UR QL: NORMAL
AMPHET UR QL CFM: NEGATIVE NG/ML
AMPHET UR QL CFM: NEGATIVE NG/ML
BUPRENORPHINE UR QL CFM: NEGATIVE NG/ML
BUTALBITAL UR QL CFM: NEGATIVE NG/ML
BZE UR QL CFM: NEGATIVE NG/ML
CODEINE UR QL CFM: NEGATIVE NG/ML
DESIPRAMINE UR QL CFM: NEGATIVE NG/ML
DESIPRAMINE UR QL CFM: NEGATIVE NG/ML
EDDP UR QL CFM: NEGATIVE NG/ML
ETHYL GLUCURONIDE UR QL CFM: NEGATIVE NG/ML
ETHYL SULFATE UR QL SCN: NEGATIVE NG/ML
EUTYLONE UR QL: NEGATIVE NG/ML
FENTANYL UR QL CFM: NEGATIVE NG/ML
GLIADIN IGG SER IA-ACNC: NEGATIVE NG/ML
GLUCOSE 30M P 50 G LAC PO SERPL-MCNC: NEGATIVE NG/ML
HYDROCODONE UR QL CFM: NORMAL NG/ML
HYDROCODONE UR QL CFM: NORMAL NG/ML
HYDROMORPHONE UR QL CFM: NORMAL NG/ML
IMIPRAMINE UR QL CFM: NEGATIVE NG/ML
LORAZEPAM UR QL CFM: NEGATIVE NG/ML
MDMA UR QL CFM: NEGATIVE NG/ML
ME-PHENIDATE UR QL CFM: NEGATIVE NG/ML
MEPERIDINE UR QL CFM: NEGATIVE NG/ML
METHADONE UR QL CFM: NEGATIVE NG/ML
METHAMPHET UR QL CFM: NEGATIVE NG/ML
MORPHINE UR QL CFM: NEGATIVE NG/ML
MORPHINE UR QL CFM: NEGATIVE NG/ML
NITRITE UR QL: NORMAL UG/ML
NORBUPRENORPHINE UR QL CFM: NEGATIVE NG/ML
NORDIAZEPAM UR QL CFM: NEGATIVE NG/ML
NORFENTANYL UR QL CFM: NEGATIVE NG/ML
NORHYDROCODONE UR QL CFM: NORMAL NG/ML
NORHYDROCODONE UR QL CFM: NORMAL NG/ML
NORMEPERIDINE UR QL CFM: NEGATIVE NG/ML
NOROXYCODONE UR QL CFM: NEGATIVE NG/ML
OPC-3373 QUANTIFICATION: NEGATIVE
OXAZEPAM UR QL CFM: NEGATIVE NG/ML
OXYCODONE UR QL CFM: NEGATIVE NG/ML
OXYMORPHONE UR QL CFM: NEGATIVE NG/ML
OXYMORPHONE UR QL CFM: NEGATIVE NG/ML
PCP UR QL CFM: NEGATIVE NG/ML
PHENOBARB UR QL CFM: NEGATIVE NG/ML
RESULT ALL_PRESCRIBED MEDS AND SPECIAL INSTRUCTIONS: NORMAL
SECOBARBITAL UR QL CFM: NEGATIVE NG/ML
SL AMB 3-METHYL-FENTANYL QUANTIFICATION: NORMAL NG/ML
SL AMB 4-ANPP QUANTIFICATION: NORMAL NG/ML
SL AMB 4-FIBF QUANTIFICATION: NORMAL NG/ML
SL AMB 5F-ADB-M7 METABOLITE QUANTIFICATION: NEGATIVE NG/ML
SL AMB 7-OH-MITRAGYNINE (KRATOM ALKALOID) QUANTIFICATION: NEGATIVE NG/ML
SL AMB AB-FUBINACA-M3 METABOLITE QUANTIFICATION: NEGATIVE NG/ML
SL AMB ACETYL FENTANYL QUANTIFICATION: NORMAL NG/ML
SL AMB ACETYL NORFENTANYL QUANTIFICATION: NORMAL NG/ML
SL AMB ACRYL FENTANYL QUANTIFICATION: NORMAL NG/ML
SL AMB BUTRYL FENTANYL QUANTIFICATION: NORMAL NG/ML
SL AMB CARFENTANIL QUANTIFICATION: NORMAL NG/ML
SL AMB CLOZAPINE QUANTIFICATION: NEGATIVE NG/ML
SL AMB CTHC (MARIJUANA METABOLITE) QUANTIFICATION: NEGATIVE NG/ML
SL AMB CYCLOPROPYL FENTANYL QUANTIFICATION: NORMAL NG/ML
SL AMB DEXTROMETHORPHAN QUANTIFICATION: NEGATIVE NG/ML
SL AMB DEXTRORPHAN (DEXTROMETHORPHAN METABOLITE) QUANT: NEGATIVE NG/ML
SL AMB DEXTRORPHAN (DEXTROMETHORPHAN METABOLITE) QUANT: NEGATIVE NG/ML
SL AMB FURANYL FENTANYL QUANTIFICATION: NORMAL NG/ML
SL AMB HALOPERIDOL  QUANTIFICATION: NEGATIVE NG/ML
SL AMB HALOPERIDOL METABOLITE QUANTIFICATION: NEGATIVE NG/ML
SL AMB JWH018 METABOLITE QUANTIFICATION: NEGATIVE NG/ML
SL AMB JWH073 METABOLITE QUANTIFICATION: NEGATIVE NG/ML
SL AMB MDMB-FUBINACA-M1 METABOLITE QUANTIFICATION: NEGATIVE NG/ML
SL AMB METHOXYACETYL FENTANYL QUANTIFICATION: NORMAL NG/ML
SL AMB METHYLONE QUANTIFICATION: NEGATIVE NG/ML
SL AMB N-DESMETHYL U-47700 QUANTIFICATION: NORMAL NG/ML
SL AMB N-DESMETHYL-TRAMADOL QUANTIFICATION: NEGATIVE NG/ML
SL AMB N-DESMETHYLCLOZAPINE QUANTIFICATION: NEGATIVE NG/ML
SL AMB PHENTERMINE QUANTIFICATION: NEGATIVE NG/ML
SL AMB RCS4 METABOLITE QUANTIFICATION: NEGATIVE NG/ML
SL AMB RITALINIC ACID QUANTIFICATION: NEGATIVE NG/ML
SL AMB U-47700 QUANTIFICATION: NORMAL NG/ML
SPECIMEN DRAWN SERPL: NEGATIVE NG/ML
SPECIMEN PH ACCEPTABLE UR: NORMAL
TAPENTADOL UR QL CFM: NEGATIVE NG/ML
TEMAZEPAM UR QL CFM: NEGATIVE NG/ML
TEMAZEPAM UR QL CFM: NEGATIVE NG/ML
TRAMADOL UR QL CFM: NEGATIVE NG/ML
URATE/CREAT 24H UR: NEGATIVE NG/ML

## 2022-02-15 ENCOUNTER — OFFICE VISIT (OUTPATIENT)
Dept: OBGYN CLINIC | Facility: CLINIC | Age: 52
End: 2022-02-15
Payer: COMMERCIAL

## 2022-02-15 VITALS
BODY MASS INDEX: 29.81 KG/M2 | SYSTOLIC BLOOD PRESSURE: 118 MMHG | WEIGHT: 162 LBS | HEIGHT: 62 IN | HEART RATE: 70 BPM | DIASTOLIC BLOOD PRESSURE: 82 MMHG

## 2022-02-15 DIAGNOSIS — S61.412S LACERATION OF LEFT HAND WITHOUT FOREIGN BODY, SEQUELA: ICD-10-CM

## 2022-02-15 DIAGNOSIS — M79.89 SWELLING OF LEFT HAND: ICD-10-CM

## 2022-02-15 DIAGNOSIS — S69.92XS: ICD-10-CM

## 2022-02-15 DIAGNOSIS — G56.02 CARPAL TUNNEL SYNDROME ON LEFT: Primary | ICD-10-CM

## 2022-02-15 PROCEDURE — 99213 OFFICE O/P EST LOW 20 MIN: CPT | Performed by: ORTHOPAEDIC SURGERY

## 2022-02-15 PROCEDURE — 20526 THER INJECTION CARP TUNNEL: CPT | Performed by: ORTHOPAEDIC SURGERY

## 2022-02-15 PROCEDURE — 3008F BODY MASS INDEX DOCD: CPT | Performed by: ORTHOPAEDIC SURGERY

## 2022-02-15 RX ORDER — TRIAMCINOLONE ACETONIDE 40 MG/ML
20 INJECTION, SUSPENSION INTRA-ARTICULAR; INTRAMUSCULAR
Status: COMPLETED | OUTPATIENT
Start: 2022-02-15 | End: 2022-02-15

## 2022-02-15 RX ORDER — LIDOCAINE HYDROCHLORIDE 10 MG/ML
0.5 INJECTION, SOLUTION INFILTRATION; PERINEURAL
Status: COMPLETED | OUTPATIENT
Start: 2022-02-15 | End: 2022-02-15

## 2022-02-15 RX ADMIN — LIDOCAINE HYDROCHLORIDE 0.5 ML: 10 INJECTION, SOLUTION INFILTRATION; PERINEURAL at 15:57

## 2022-02-15 RX ADMIN — TRIAMCINOLONE ACETONIDE 20 MG: 40 INJECTION, SUSPENSION INTRA-ARTICULAR; INTRAMUSCULAR at 15:57

## 2022-02-15 NOTE — PROGRESS NOTES
Assessment/Plan:  1  Carpal tunnel syndrome on left  Cock Up Wrist Splint   2  Swelling of left hand  Ambulatory Referral to Hand Surgery   3  Laceration of left hand without foreign body, sequela  Ambulatory Referral to Hand Surgery    Ambulatory Referral to PT/OT Hand Therapy    CANCELED: Ambulatory Referral to PT/OT Hand Therapy   4  Soft tissue injury of left hand, sequela  Ambulatory Referral to Hand Surgery    Ambulatory Referral to PT/OT Hand Therapy    CANCELED: Ambulatory Referral to PT/OT Hand Therapy     49year old female with left dorsal hand laceration and previous hematoma with residual scar tissue and stiffness  We discussed the anatomy and location of where patient's laceration was and that this would not be causing numbness in the area she is experiencing this  Instead this is likely carpal tunnel  The pathology of carpal tunnel was discussed in detail today, including treatment options  Patient elects to try nighttime bracing as well as a steroid injection today  The left carpal tunnel was prepped in the normal fashion and steroid administered here  Patient tolerated the procedure well  She does, however, have some scar adhesions around the area of her previous laceration and this likely is contributing to some of the stiffness and hypersensitivity in this area  For this, patient agrees to pursue a therapy program  Patient should follow up in 2 months for reevaluation  Subjective:   Salvatore Murcia is a 49 y o  female who presents to the office today for evaluation and treatment of left hand pain/stiffness as well as paresthesias  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 back on 01/08/22 when changing a scalpel blade  She did go to the ED where this was cleaned and sutured  Patient states she then developed a large tennis ball-sized hematoma on the back of her hand  This improved with compression wraps   She states she continues to have discomfort over the dorsal hand and stiffness sensation when making a fist  She has been performing exercises on her own but has never down formal therapy for this  She states she's also noticed n/t in her fingertips  She states that she believes she may have had carpal tunnel in the past but was never formally diagnosed  Symptoms are worse at night  Review of Systems   Musculoskeletal:        As noted in HPI  All other systems reviewed and are negative  Past Medical History:   Diagnosis Date    Chronic hip pain, left        Past Surgical History:   Procedure Laterality Date    ANKLE ARTHROPLASTY      BREAST LUMPECTOMY      BREAST SURGERY Bilateral     Enlargement    EPIDURAL BLOCK INJECTION Left 3/5/2021    Procedure: left L5 transforaminal epidural steroid injection ( 00528); Surgeon: Linda Alvarado MD;  Location: [facility] MAIN OR;  Service: Pain Management     LAPAROSCOPY FOR ECTOPIC PREGNANCY      And Salpingectomy    STEROID INJECTION HIP Left 4/16/2021    Procedure: Sacroiliac Joint Injection (49499);   Surgeon: Linda Alvarado MD;  Location: [facility] MAIN OR;  Service: Pain Management     TOTAL HIP ARTHROPLASTY Left 09/2018       Family History   Problem Relation Age of Onset    Breast cancer Mother     Arthritis Mother     Emphysema Mother     No Known Problems Father     No Known Problems Sister     No Known Problems Brother     No Known Problems Maternal Grandmother     No Known Problems Maternal Grandfather     No Known Problems Paternal Grandmother     No Known Problems Paternal Grandfather     Breast cancer Maternal Aunt     No Known Problems Maternal Uncle     No Known Problems Paternal Aunt     No Known Problems Paternal Uncle        Social History     Occupational History    Not on file   Tobacco Use    Smoking status: Never Smoker    Smokeless tobacco: Never Used    Tobacco comment: Former smoker, per allscripts   Vaping Use    Vaping Use: Never used   Substance and Sexual Activity  Alcohol use: Never     Comment: social drinker, per allscripts    Drug use: No    Sexual activity: Not on file         Current Outpatient Medications:     gabapentin (NEURONTIN) 100 mg capsule, Take 1 capsule (100 mg total) by mouth daily, Disp: 30 capsule, Rfl: 1    HYDROcodone-acetaminophen (NORCO) 7 5-325 mg per tablet, Take 1 tablet by mouth every 8 (eight) hours as needed for pain Max Daily Amount: 3 tablets, Disp: 90 tablet, Rfl: 0    [START ON 2/23/2022] HYDROcodone-acetaminophen (NORCO) 7 5-325 mg per tablet, Take 1 tablet by mouth every 8 (eight) hours as needed for pain Max Daily Amount: 3 tablets, Disp: 90 tablet, Rfl: 0    meloxicam (MOBIC) 7 5 mg tablet, Take 1 tablet (7 5 mg total) by mouth daily, Disp: 30 tablet, Rfl: 1    ondansetron (Zofran ODT) 4 mg disintegrating tablet, Take 1 tablet (4 mg total) by mouth every 6 (six) hours as needed for nausea or vomiting, Disp: 10 tablet, Rfl: 0    Allergies   Allergen Reactions    Peach [Prunus Persica] Other (See Comments)     Closure of throat with peaches, apricots,plums , nectarine ,and almonds       Objective:  Vitals:    02/15/22 1428   BP: 118/82   Pulse: 70       Ortho Exam   Left Hand:  Healed laceration approx 2cm in length along her dorsal hand near level of 2nd metacarpal base  Patient's scar is hypersensitive to touch  FDS/FDP/Extensors intact  Patient is able to make a full fist but describes stiffness in her knuckles when doing so  Patient with dorsal hand pain with active finger extension against resistance  Palpable adhesions felt near area of laceration  Patient with + Tinel's carpal tunnel   + Durkan's compression test   Brisk capillary refill  Physical Exam  Vitals reviewed  Constitutional:       Appearance: Normal appearance  She is well-developed  HENT:      Head: Normocephalic and atraumatic  Eyes:      Extraocular Movements: Extraocular movements intact        Conjunctiva/sclera: Conjunctivae normal    Neck: Trachea: No tracheal deviation  Cardiovascular:      Pulses: Normal pulses  Pulmonary:      Effort: Pulmonary effort is normal    Abdominal:      Tenderness: There is no guarding  Skin:     General: Skin is warm and dry  Neurological:      Mental Status: She is alert and oriented to person, place, and time  Psychiatric:         Behavior: Behavior normal          Thought Content: Thought content normal          Judgment: Judgment normal          Studies Reviewed:  I have personally reviewed pertinent films in PACS and my interpretation is as follows:  Previous xrays of the hand from 01/25/22 demonstrate no acute osseous abnormality      Procedures Performed:  Hand/upper extremity injection: L carpal tunnel  [place] Protocol:  Consent: Verbal consent obtained  Risks and benefits: risks, benefits and alternatives were discussed  Consent given by: patient  Time out: Immediately prior to procedure a "time out" was called to verify the correct patient, procedure, equipment, support staff and site/side marked as required    Patient understanding: patient states understanding of the procedure being performed  Patient identity confirmed: verbally with patient    Supporting Documentation  Indications: therapeutic   Procedure Details  Condition:carpal tunnel syndrome Site: L carpal tunnel   Needle size: 27 G  Ultrasound guidance: no  Approach: volar  Medications administered: 0 5 mL lidocaine 1 %; 20 mg triamcinolone acetonide 40 mg/mL    Patient tolerance: patient tolerated the procedure well with no immediate complications  Dressing:  Sterile dressing applied

## 2022-02-21 ENCOUNTER — EVALUATION (OUTPATIENT)
Dept: OCCUPATIONAL THERAPY | Facility: CLINIC | Age: 52
End: 2022-02-21
Payer: COMMERCIAL

## 2022-02-21 DIAGNOSIS — G56.02 CARPAL TUNNEL SYNDROME OF LEFT WRIST: Primary | ICD-10-CM

## 2022-02-21 DIAGNOSIS — S61.412S LACERATION OF LEFT HAND WITHOUT FOREIGN BODY, SEQUELA: ICD-10-CM

## 2022-02-21 DIAGNOSIS — S69.92XS: ICD-10-CM

## 2022-02-21 PROCEDURE — 97110 THERAPEUTIC EXERCISES: CPT

## 2022-02-21 PROCEDURE — 97165 OT EVAL LOW COMPLEX 30 MIN: CPT

## 2022-02-21 NOTE — PROGRESS NOTES
OT Evaluation     Today's date: 2022  Patient name: Lefty Andrews  : 1970  MRN: [id number]  Referring provider: Yves Guzman PA-C  Dx:   Encounter Diagnosis     ICD-10-CM    1  Carpal tunnel syndrome of left wrist  G56 02    2  Laceration of left hand without foreign body, sequela  S61 412S Ambulatory Referral to PT/OT Hand Therapy   3  Soft tissue injury of left hand, sequela  S69 [id number] Ambulatory Referral to PT/OT Hand Therapy       Start Time: 2885  Stop Time: 218  Total time in clinic (min): 44 minutes    Assessment  Assessment details: Patient reported that she was changing a blade and that she cut the top of her hand on 2022  Patient reported that she got 5 sutures and got a hematoma  Patient reported that sometimes she gets burning in her arm, but that it hasn't happened in a few days  Patient reported that she also has CTS and that she has been dealing with these symptoms for years  Lefty Andrews is a 49 y o  female who presents with Laceration of left hand without foreign body, Soft tissue injury of left hand, and carpal tunnel syndrome  Patient tolerated session well  Falcon Mage reported difficulty with activities of daily living secondary to decreased range of motion  Provided home exercise program for digit and wrist range of motion  Patient was able to demonstrate home program past instruction with use of handouts  Patient advised to contact doctor if there is a change of status  Patient advised to discontinue any exercise which cause increased pain  Patient is a good candidate to benefit from skilled occupational therapy to address impairments and return to maximal level of function with minimal symptoms       Impairments: abnormal or restricted ROM, impaired physical strength, lacks appropriate home exercise program and pain with function  Understanding of Dx/Px/POC: good   Prognosis: good    Goals  Short term goals:  Patient to demonstrate understanding of home exercise program in 2 weeks for decreased pain with activities of daily living  Patient to demonstrate increased thumb opposition to bring thumb to distal palmar crease in 4 weeks to aid in in hand manipulation  Patient to demonstrate increased  strength to 35 lbs in 4 weeks to increase  on full cup  Patient to report a decrease in pain by at least 1 point on a 0-10 scale in 2 weeks to aid in dressing    Long term goals:  Patient to demonstrate functional active range of motion for independent ADL's by time of discharge  Patient to demonstrate functional strength for independent ADL's by time of discharge  Patient to demonstrate understanding of final discharge home exercise program by time of discharge    Plan  Patient would benefit from: OT eval and skilled occupational therapy  Planned modality interventions: ultrasound and thermotherapy: hydrocollator packs  Planned therapy interventions: joint mobilization, manual therapy, massage, strengthening, stretching, therapeutic activities, therapeutic exercise, fine motor coordination training, flexibility, functional ROM exercises and home exercise program  Frequency: 1-2x/week  Duration in weeks: 8  Plan of Care beginning date: 2/21/2022  Plan of Care expiration date: 4/18/2022  Treatment plan discussed with: patient        Subjective Evaluation    History of Present Illness  Mechanism of injury: Patient reported that she was changing a blade and that she cut the top of her hand on 1/8/2022  Patient reported that she got 5 sutures and got a hematoma  "It's always worse at night when I'm finished doing things " Patient reported that sometimes she gets burning in her arm, but that it hasn't happened in a few days  Patient reported that she also has CTS and that she has been dealing with these symptoms for years  Patient reported being independent with ADLs  "I can't lift anything too heavy with this because I get pain in my upper knuckles   I get pain where the actual scar is " "If I'm doing too much my hand will go numb "  Quality of life: good    Pain  Current pain ratin  At best pain ratin  At worst pain ratin  Location: left hand  Quality: burning, tight, throbbing and radiating (electric)  Relieving factors: heat  Aggravating factors: lifting  Progression: improved    Social Support    Employment status: not working  Hand dominance: right    Patient Goals  Patient goals for therapy: decreased pain, increased motion and increased strength          Objective     Observations     Left Wrist/Hand   Positive for adhesive scar and edema  Additional Observation Details  1 5 cm adhesive scar at dorsal hand    Active Range of Motion     Left Wrist   Wrist flexion: 61 degrees   Wrist extension: 62 degrees     Right Wrist   Wrist flexion: 62 degrees   Wrist extension: 54 degrees     Additional Active Range of Motion Details  Distance to distal palmar crease:  Left hand:  Thumb: 3 cm  Index: 0 5 cm    Strength/Myotome Testing     Left Wrist/Hand      (2nd hand position)     Trial 1: 24    Thumb Strength  Palmar/Three-Point Pinch     Trial 1: 8    Right Wrist/Hand      (2nd hand position)     Trial 1: 60    Thumb Strength   Palmar/Three-Point Pinch     Trial 1: 13    Swelling     Left Wrist/Hand   Circumference MCP: 20 3 cm    Right Wrist/Hand   Circumference MCP: 19 3 cm             Precautions: Universal    Manuals HEP    STM x3 min                   Ther Ex     Median nerve glides x10    Tendon glides x10    Digit ext/abd x10    Wrist flexor/extensor stretch 10 sec x3    Prayer stretch 10 sec x3    AROM wrist/thumb x10                   Ther Activity                                   Modalities     Moist heat 5 min             Assessment:   Patient tolerated session well  Patient session focused on patient education on anatomy and physiology concerning current dx, techniques for decreasing deficits through HEP, and appropriate use of modalities   Patient educated on HEP with verbal instructions and handouts for patient reference  Patient educated on treatment plan at this time  Patient benefiting from skilled hand therapy OT to reduce deficits to improve independence with daily activities  Plan:   Focus on AROM to improve ability to complete daily activites with ease

## 2022-03-02 ENCOUNTER — OFFICE VISIT (OUTPATIENT)
Dept: OCCUPATIONAL THERAPY | Facility: CLINIC | Age: 52
End: 2022-03-02
Payer: COMMERCIAL

## 2022-03-02 DIAGNOSIS — S61.412S LACERATION OF LEFT HAND WITHOUT FOREIGN BODY, SEQUELA: Primary | ICD-10-CM

## 2022-03-02 DIAGNOSIS — S69.92XS: ICD-10-CM

## 2022-03-02 DIAGNOSIS — G56.02 CARPAL TUNNEL SYNDROME OF LEFT WRIST: ICD-10-CM

## 2022-03-02 PROCEDURE — 97110 THERAPEUTIC EXERCISES: CPT

## 2022-03-02 PROCEDURE — 97112 NEUROMUSCULAR REEDUCATION: CPT

## 2022-03-02 NOTE — PROGRESS NOTES
Daily Note     Today's date: 3/2/2022  Patient name: Conchita Roman  : 1970  MRN: [id number]  Referring provider: Ortiz Venegas PA-C  Dx:   Encounter Diagnosis     ICD-10-CM    1  Laceration of left hand without foreign body, sequela  S61 412S    2  Soft tissue injury of left hand, sequela  S69 [id number]    3  Carpal tunnel syndrome of left wrist  G56 02        Start Time:   Stop Time: 924  Total time in clinic (min): 42 minutes    Subjective: "After just one visit my hand feels phenomenal " "My grocery bag if it's too heavy I can't hold for too long  If it's too heavy I have to switch hands  Other than that it's good "      Objective: See treatment diary below  0 cm from distal palmar crease with thumb opposition     Precautions: [place]    Manuals HEP 3/2/2022    STM x3 min                   Ther Ex     Median nerve glides x10    Tendon glides x10    Digit ext/abd x10 x10   Wrist flexor/extensor stretch 10 sec x3 10 sec x3   Prayer stretch 10 sec x3    AROM wrist/thumb x10 x10   Resisted digit extension Yellow x10 Yellow x10   STM  x3 min        Neuro Re-ed     Juxtaciser  x8   Small pegs  x20   Towel scrunches  x10   Iron balls  x15   Theraputty- , roll, pinch Shen Shen        Modalities     Moist heat 5 min 5 min            Assessment:   Patient tolerated session well  Patient session focused on patient education, range of motion, scar management, neuro re-ed, and gentle strengthening  Patient reported being compliant with home exercises  She tolerated new range of motion exercises without complaints of pain  Initiated gentle strengthening exercises this date with good tolerance  Provided strengthening home exercise program to be performed once daily  Patient benefiting from skilled hand therapy OT to reduce deficits to improve independence with daily activities  Plan:   Focus on range of motion, neuro re-ed, and strengthening to improve ability to complete daily activites with ease   POC: 2/21/2022 - 4/18/2022

## 2022-03-09 ENCOUNTER — APPOINTMENT (OUTPATIENT)
Dept: OCCUPATIONAL THERAPY | Facility: CLINIC | Age: 52
End: 2022-03-09
Payer: COMMERCIAL

## 2022-03-15 ENCOUNTER — OFFICE VISIT (OUTPATIENT)
Dept: PAIN MEDICINE | Facility: CLINIC | Age: 52
End: 2022-03-15
Payer: COMMERCIAL

## 2022-03-15 ENCOUNTER — TELEPHONE (OUTPATIENT)
Dept: PAIN MEDICINE | Facility: CLINIC | Age: 52
End: 2022-03-15

## 2022-03-15 VITALS
HEART RATE: 69 BPM | WEIGHT: 162 LBS | OXYGEN SATURATION: 97 % | HEIGHT: 62 IN | SYSTOLIC BLOOD PRESSURE: 127 MMHG | TEMPERATURE: 98 F | DIASTOLIC BLOOD PRESSURE: 89 MMHG | BODY MASS INDEX: 29.81 KG/M2

## 2022-03-15 DIAGNOSIS — M46.1 SACROILIITIS (HCC): ICD-10-CM

## 2022-03-15 DIAGNOSIS — G89.4 CHRONIC PAIN SYNDROME: Primary | ICD-10-CM

## 2022-03-15 DIAGNOSIS — M25.552 PAIN IN LEFT HIP: ICD-10-CM

## 2022-03-15 PROCEDURE — 3008F BODY MASS INDEX DOCD: CPT | Performed by: NURSE PRACTITIONER

## 2022-03-15 PROCEDURE — 99214 OFFICE O/P EST MOD 30 MIN: CPT | Performed by: NURSE PRACTITIONER

## 2022-03-15 RX ORDER — HYDROCODONE BITARTRATE AND ACETAMINOPHEN 7.5; 325 MG/1; MG/1
1 TABLET ORAL EVERY 8 HOURS PRN
Qty: 90 TABLET | Refills: 0 | Status: SHIPPED | OUTPATIENT
Start: 2022-04-30 | End: 2022-05-09 | Stop reason: SDUPTHER

## 2022-03-15 RX ORDER — GABAPENTIN 300 MG/1
300 CAPSULE ORAL 2 TIMES DAILY
Qty: 60 CAPSULE | Refills: 1 | Status: SHIPPED | OUTPATIENT
Start: 2022-03-15 | End: 2022-05-09 | Stop reason: SDUPTHER

## 2022-03-15 RX ORDER — MELOXICAM 15 MG/1
15 TABLET ORAL DAILY
Qty: 30 TABLET | Refills: 1 | Status: SHIPPED | OUTPATIENT
Start: 2022-03-15 | End: 2022-05-09 | Stop reason: SDUPTHER

## 2022-03-15 RX ORDER — GABAPENTIN 100 MG/1
100 CAPSULE ORAL DAILY
Qty: 30 CAPSULE | Refills: 1 | Status: CANCELLED | OUTPATIENT
Start: 2022-03-15

## 2022-03-15 RX ORDER — HYDROCODONE BITARTRATE AND ACETAMINOPHEN 7.5; 325 MG/1; MG/1
1 TABLET ORAL EVERY 8 HOURS PRN
Qty: 90 TABLET | Refills: 0 | Status: SHIPPED | OUTPATIENT
Start: 2022-03-31 | End: 2022-04-30

## 2022-03-15 NOTE — H&P (VIEW-ONLY)
Pain Medicine Follow-Up Note    Assessment:  1  Chronic pain syndrome    2  Sacroiliitis ([address] )        Plan:  No orders of the defined types were placed in this encounter  New Medications Ordered This Visit   Medications    HYDROcodone-acetaminophen (NORCO) 7 5-325 mg per tablet     Sig: Take 1 tablet by mouth every 8 (eight) hours as needed for pain Max Daily Amount: 3 tablets     Dispense:  90 tablet     Refill:  0    HYDROcodone-acetaminophen (NORCO) 7 5-325 mg per tablet     Sig: Take 1 tablet by mouth every 8 (eight) hours as needed for pain Max Daily Amount: 3 tablets     Dispense:  90 tablet     Refill:  0    meloxicam (MOBIC) 15 mg tablet     Sig: Take 1 tablet (15 mg total) by mouth daily Take with food     Dispense:  30 tablet     Refill:  1    gabapentin (NEURONTIN) 300 mg capsule     Sig: Take 1 capsule (300 mg total) by mouth 2 (two) times a day     Dispense:  60 capsule     Refill:  1       My impressions and treatment recommendations were discussed in detail with the patient who verbalized understanding and had no further questions  The patient was seen in the office today for re-evaluation of her chronic pain secondary to sacroiliitis and left hip pain  She states that her pain is continuing to worsen and is currently a 9/10 and constant becoming worse in the evening and at night  She is asking for an increase in her Norco   Instead of increasing her opioids, we will 1st try increasing her gabapentin and her meloxicam     She reports overall reduced pain and improved level of functioning taking Norco 7 5/325 3 times a day  She reports no side effects from this medication  Refills were sent to the pharmacy on file with fill dates of 03/31/2022 and 04/30/2022  Her gabapentin was increased from 100 mg at bedtime to 300 mg twice a day on a titrating schedule    She reports no side effects from this medication and prescription was sent to the pharmacy on file    Meloxicam was increased from 7 5 mg daily to 15 mg daily  She reports no side effects from this medication and prescription was sent to the pharmacy on file  We will also schedule the patient for a left-sided sacroiliac joint injection  Her last injection was on 04/16/2021 and she states that she gets greater than 50% relief from this procedure  [place] Prescription Drug Monitoring Program report was reviewed and was appropriate       There are risks associated with opioid medications, including dependence, addiction and tolerance  The patient understands and agrees to use these medications only as prescribed  Potential side effects of the medications include, but are not limited to, constipation, drowsiness, addiction, impaired judgment and risk of fatal overdose if not taken as prescribed  The patient was warned against driving while taking sedation medications  Sharing medications is a felony  At this point in time, the patient is showing no signs of addiction, abuse, diversion or suicidal ideation  Complete risks and benefits including bleeding, infection, tissue reaction, nerve injury and allergic reaction were discussed  The approach was demonstrated using models and literature was provided  Verbal and written consent was obtained  Follow-up is planned in 8 weeks time or sooner as warranted  Discharge instructions were provided  I personally saw and examined the patient and I agree with the above discussed plan of care  History of Present Illness:    Marco Dorman is a 49 y o  female who presents to [facility] for interval re-evaluation of the above stated pain complaints  The patient has a past medical and chronic pain history as outlined in the assessment section  She was last seen on 01/24/2022  She reports a pain score of 9/10 today that is constant and becomes worse in the evening and at night  She describes the quality as sharp, throbbing and shooting    She states that the pain is in her left groin and left upper buttock area she states that the pain does not stop and that her medications just take the edge off  Patient is status post total hip replacement on the left side by a surgeon at [facility]  She states that she has not been happy with the job that was done and she has been left with worse pain than prior to her surgery  She was seen by Dr Vazquez Montalvo and she tells me that initially she was told he could help her and then he told her there was nothing he could do  I did offer to refer her outside of [facility] if she would like for a 2nd opinion and the patient became very upset stating that she felt like she was being discarded  I apologized to the patient and told her that I did not mean for her to feel like that I was is trying to help her have options to getting her the help that she wants for her severe pain in her left hip  She became teary-eyed and frustrated stating that she feels like she can not get any help for the pain in her left hip and that is beginning to become so severe that she loses sleep over it  Pain Contract Signed:  3/15/22  Last Urine Drug Screen:  1/24/22    Other than as stated above, the patient denies any interval changes in medications, medical condition, mental condition, symptoms, or allergies since the last office visit  Review of Systems:    Review of Systems   Respiratory: Negative for shortness of breath  Cardiovascular: Negative for chest pain  Gastrointestinal: Negative for constipation, diarrhea, nausea and vomiting  Musculoskeletal: Positive for back pain, gait problem, joint swelling and myalgias  Negative for arthralgias  Skin: Negative for rash  Neurological: Positive for weakness  Negative for dizziness and seizures  All other systems reviewed and are negative          Patient Active Problem List   Diagnosis    Anxiety    Poison ivy    S/P hip replacement, left    Chronic pain syndrome  Pain in left hip    Pain in both feet    Anxiety and depression    Heel pain, bilateral    Foot arch pain    Encounter for screening mammogram for breast cancer    Fatigue    Abnormal glucose    BMI 31 0-31 9,adult    Herpes zoster without complication    [id number] virus infection       Past Medical History:   Diagnosis Date    Chronic hip pain, left        Past Surgical History:   Procedure Laterality Date    ANKLE ARTHROPLASTY      BREAST LUMPECTOMY      BREAST SURGERY Bilateral     Enlargement    EPIDURAL BLOCK INJECTION Left 3/5/2021    Procedure: left L5 transforaminal epidural steroid injection ( 56623); Surgeon: Jaky Jeff MD;  Location: [facility] MAIN OR;  Service: Pain Management     LAPAROSCOPY FOR ECTOPIC PREGNANCY      And Salpingectomy    ORTHOPEDIC SURGERY  9/2018    STEROID INJECTION HIP Left 4/16/2021    Procedure: Sacroiliac Joint Injection (88350);   Surgeon: Jaky Jeff MD;  Location: [facility] MAIN OR;  Service: Pain Management     TOTAL HIP ARTHROPLASTY Left 09/2018       Family History   Problem Relation Age of Onset    Breast cancer Mother     Arthritis Mother     Emphysema Mother     COPD Mother     Alcohol abuse Father         He has been clean for 2 years    No Known Problems Sister     No Known Problems Brother     No Known Problems Maternal Grandmother     No Known Problems Maternal Grandfather     No Known Problems Paternal Grandmother     No Known Problems Paternal Grandfather     Breast cancer Maternal Aunt     Cancer Maternal Aunt     No Known Problems Maternal Uncle     No Known Problems Paternal Aunt     No Known Problems Paternal Uncle        Social History     Occupational History    Not on file   Tobacco Use    Smoking status: Never Smoker    Smokeless tobacco: Never Used    Tobacco comment: Former smoker, per allscripts   Vaping Use    Vaping Use: Never used   Substance and Sexual Activity    Alcohol use: No     Comment: social drinker, per allscripts    Drug use: No    Sexual activity: Not Currently     Partners: Male     Birth control/protection: Post-menopausal         Current Outpatient Medications:     meloxicam (MOBIC) 15 mg tablet, Take 1 tablet (15 mg total) by mouth daily Take with food, Disp: 30 tablet, Rfl: 1    ondansetron (Zofran ODT) 4 mg disintegrating tablet, Take 1 tablet (4 mg total) by mouth every 6 (six) hours as needed for nausea or vomiting, Disp: 10 tablet, Rfl: 0    gabapentin (NEURONTIN) 300 mg capsule, Take 1 capsule (300 mg total) by mouth 2 (two) times a day, Disp: 60 capsule, Rfl: 1    [START ON 3/31/2022] HYDROcodone-acetaminophen (NORCO) 7 5-325 mg per tablet, Take 1 tablet by mouth every 8 (eight) hours as needed for pain Max Daily Amount: 3 tablets, Disp: 90 tablet, Rfl: 0    [START ON 4/30/2022] HYDROcodone-acetaminophen (NORCO) 7 5-325 mg per tablet, Take 1 tablet by mouth every 8 (eight) hours as needed for pain Max Daily Amount: 3 tablets, Disp: 90 tablet, Rfl: 0    Allergies   Allergen Reactions    Peach [Prunus Persica] Other (See Comments)     Closure of throat with peaches, apricots,plums , nectarine ,and almonds       Physical Exam:    /89   Pulse 69   Temp 98 °F (36 7 °C)   Ht 5' 2" (1 575 m)   Wt 73 5 kg (162 lb)   SpO2 97%   BMI 29 63 kg/m²     Constitutional:normal, well developed, well nourished, alert, in no distress and non-toxic and no overt pain behavior  Eyes:anicteric  HEENT:grossly intact  Neck:supple, symmetric, trachea midline and no masses   Pulmonary:even and unlabored  Cardiovascular:No edema or pitting edema present  Skin:Normal without rashes or lesions and well hydrated  Psychiatric:Mood and affect appropriate  Neurologic:Cranial Nerves II-XII grossly intact  Musculoskeletal:antalgic and Positive left-sided STEVEN, thigh thrust, and distraction testing      Imaging  No orders to display         No orders of the defined types were placed in this encounter

## 2022-03-15 NOTE — PROGRESS NOTES
Pain Medicine Follow-Up Note    Assessment:  1  Chronic pain syndrome    2  Sacroiliitis ([address] )        Plan:  No orders of the defined types were placed in this encounter  New Medications Ordered This Visit   Medications    HYDROcodone-acetaminophen (NORCO) 7 5-325 mg per tablet     Sig: Take 1 tablet by mouth every 8 (eight) hours as needed for pain Max Daily Amount: 3 tablets     Dispense:  90 tablet     Refill:  0    HYDROcodone-acetaminophen (NORCO) 7 5-325 mg per tablet     Sig: Take 1 tablet by mouth every 8 (eight) hours as needed for pain Max Daily Amount: 3 tablets     Dispense:  90 tablet     Refill:  0    meloxicam (MOBIC) 15 mg tablet     Sig: Take 1 tablet (15 mg total) by mouth daily Take with food     Dispense:  30 tablet     Refill:  1    gabapentin (NEURONTIN) 300 mg capsule     Sig: Take 1 capsule (300 mg total) by mouth 2 (two) times a day     Dispense:  60 capsule     Refill:  1       My impressions and treatment recommendations were discussed in detail with the patient who verbalized understanding and had no further questions  The patient was seen in the office today for re-evaluation of her chronic pain secondary to sacroiliitis and left hip pain  She states that her pain is continuing to worsen and is currently a 9/10 and constant becoming worse in the evening and at night  She is asking for an increase in her Norco   Instead of increasing her opioids, we will 1st try increasing her gabapentin and her meloxicam     She reports overall reduced pain and improved level of functioning taking Norco 7 5/325 3 times a day  She reports no side effects from this medication  Refills were sent to the pharmacy on file with fill dates of 03/31/2022 and 04/30/2022  Her gabapentin was increased from 100 mg at bedtime to 300 mg twice a day on a titrating schedule    She reports no side effects from this medication and prescription was sent to the pharmacy on file    Meloxicam was increased from 7 5 mg daily to 15 mg daily  She reports no side effects from this medication and prescription was sent to the pharmacy on file  We will also schedule the patient for a left-sided sacroiliac joint injection  Her last injection was on 04/16/2021 and she states that she gets greater than 50% relief from this procedure  [place] Prescription Drug Monitoring Program report was reviewed and was appropriate       There are risks associated with opioid medications, including dependence, addiction and tolerance  The patient understands and agrees to use these medications only as prescribed  Potential side effects of the medications include, but are not limited to, constipation, drowsiness, addiction, impaired judgment and risk of fatal overdose if not taken as prescribed  The patient was warned against driving while taking sedation medications  Sharing medications is a felony  At this point in time, the patient is showing no signs of addiction, abuse, diversion or suicidal ideation  Complete risks and benefits including bleeding, infection, tissue reaction, nerve injury and allergic reaction were discussed  The approach was demonstrated using models and literature was provided  Verbal and written consent was obtained  Follow-up is planned in 8 weeks time or sooner as warranted  Discharge instructions were provided  I personally saw and examined the patient and I agree with the above discussed plan of care  History of Present Illness:    Miguel Scott is a 49 y o  female who presents to [facility] for interval re-evaluation of the above stated pain complaints  The patient has a past medical and chronic pain history as outlined in the assessment section  She was last seen on 01/24/2022  She reports a pain score of 9/10 today that is constant and becomes worse in the evening and at night  She describes the quality as sharp, throbbing and shooting    She states that the pain is in her left groin and left upper buttock area she states that the pain does not stop and that her medications just take the edge off  Patient is status post total hip replacement on the left side by a surgeon at [facility]  She states that she has not been happy with the job that was done and she has been left with worse pain than prior to her surgery  She was seen by Dr Alison Jacobs and she tells me that initially she was told he could help her and then he told her there was nothing he could do  I did offer to refer her outside of [facility] if she would like for a 2nd opinion and the patient became very upset stating that she felt like she was being discarded  I apologized to the patient and told her that I did not mean for her to feel like that I was is trying to help her have options to getting her the help that she wants for her severe pain in her left hip  She became teary-eyed and frustrated stating that she feels like she can not get any help for the pain in her left hip and that is beginning to become so severe that she loses sleep over it  Pain Contract Signed:  3/15/22  Last Urine Drug Screen:  1/24/22    Other than as stated above, the patient denies any interval changes in medications, medical condition, mental condition, symptoms, or allergies since the last office visit  Review of Systems:    Review of Systems   Respiratory: Negative for shortness of breath  Cardiovascular: Negative for chest pain  Gastrointestinal: Negative for constipation, diarrhea, nausea and vomiting  Musculoskeletal: Positive for back pain, gait problem, joint swelling and myalgias  Negative for arthralgias  Skin: Negative for rash  Neurological: Positive for weakness  Negative for dizziness and seizures  All other systems reviewed and are negative          Patient Active Problem List   Diagnosis    Anxiety    Poison ivy    S/P hip replacement, left    Chronic pain syndrome  Pain in left hip    Pain in both feet    Anxiety and depression    Heel pain, bilateral    Foot arch pain    Encounter for screening mammogram for breast cancer    Fatigue    Abnormal glucose    BMI 31 0-31 9,adult    Herpes zoster without complication    [id number] virus infection       Past Medical History:   Diagnosis Date    Chronic hip pain, left        Past Surgical History:   Procedure Laterality Date    ANKLE ARTHROPLASTY      BREAST LUMPECTOMY      BREAST SURGERY Bilateral     Enlargement    EPIDURAL BLOCK INJECTION Left 3/5/2021    Procedure: left L5 transforaminal epidural steroid injection ( 04194); Surgeon: Ariana Boss MD;  Location: [facility] MAIN OR;  Service: Pain Management     LAPAROSCOPY FOR ECTOPIC PREGNANCY      And Salpingectomy    ORTHOPEDIC SURGERY  9/2018    STEROID INJECTION HIP Left 4/16/2021    Procedure: Sacroiliac Joint Injection (40992);   Surgeon: Ariana Boss MD;  Location: [facility] MAIN OR;  Service: Pain Management     TOTAL HIP ARTHROPLASTY Left 09/2018       Family History   Problem Relation Age of Onset    Breast cancer Mother     Arthritis Mother     Emphysema Mother     COPD Mother     Alcohol abuse Father         He has been clean for 2 years    No Known Problems Sister     No Known Problems Brother     No Known Problems Maternal Grandmother     No Known Problems Maternal Grandfather     No Known Problems Paternal Grandmother     No Known Problems Paternal Grandfather     Breast cancer Maternal Aunt     Cancer Maternal Aunt     No Known Problems Maternal Uncle     No Known Problems Paternal Aunt     No Known Problems Paternal Uncle        Social History     Occupational History    Not on file   Tobacco Use    Smoking status: Never Smoker    Smokeless tobacco: Never Used    Tobacco comment: Former smoker, per allscripts   Vaping Use    Vaping Use: Never used   Substance and Sexual Activity    Alcohol use: No     Comment: social drinker, per allscripts    Drug use: No    Sexual activity: Not Currently     Partners: Male     Birth control/protection: Post-menopausal         Current Outpatient Medications:     meloxicam (MOBIC) 15 mg tablet, Take 1 tablet (15 mg total) by mouth daily Take with food, Disp: 30 tablet, Rfl: 1    ondansetron (Zofran ODT) 4 mg disintegrating tablet, Take 1 tablet (4 mg total) by mouth every 6 (six) hours as needed for nausea or vomiting, Disp: 10 tablet, Rfl: 0    gabapentin (NEURONTIN) 300 mg capsule, Take 1 capsule (300 mg total) by mouth 2 (two) times a day, Disp: 60 capsule, Rfl: 1    [START ON 3/31/2022] HYDROcodone-acetaminophen (NORCO) 7 5-325 mg per tablet, Take 1 tablet by mouth every 8 (eight) hours as needed for pain Max Daily Amount: 3 tablets, Disp: 90 tablet, Rfl: 0    [START ON 4/30/2022] HYDROcodone-acetaminophen (NORCO) 7 5-325 mg per tablet, Take 1 tablet by mouth every 8 (eight) hours as needed for pain Max Daily Amount: 3 tablets, Disp: 90 tablet, Rfl: 0    Allergies   Allergen Reactions    Peach [Prunus Persica] Other (See Comments)     Closure of throat with peaches, apricots,plums , nectarine ,and almonds       Physical Exam:    /89   Pulse 69   Temp 98 °F (36 7 °C)   Ht 5' 2" (1 575 m)   Wt 73 5 kg (162 lb)   SpO2 97%   BMI 29 63 kg/m²     Constitutional:normal, well developed, well nourished, alert, in no distress and non-toxic and no overt pain behavior  Eyes:anicteric  HEENT:grossly intact  Neck:supple, symmetric, trachea midline and no masses   Pulmonary:even and unlabored  Cardiovascular:No edema or pitting edema present  Skin:Normal without rashes or lesions and well hydrated  Psychiatric:Mood and affect appropriate  Neurologic:Cranial Nerves II-XII grossly intact  Musculoskeletal:antalgic and Positive left-sided STEVEN, thigh thrust, and distraction testing      Imaging  No orders to display         No orders of the defined types were placed in this encounter

## 2022-03-15 NOTE — PATIENT INSTRUCTIONS
Opioid Safety   WHAT YOU NEED TO KNOW:   What do I need to know about opioid safety? Safety includes the correct use, storage, and disposal of opioids  Examples of opioid pain medicines are oxycodone, morphine, fentanyl, and codeine  How are opioids given? Opioids can be given as a pill, patch, or suppository  They can also be given as an injection into a vein, near a nerve, or into a joint  Your prescription may include one or both of the following:  · Short-acting opioids  work fast and relieve pain for about 3 to 6 hours  They are often used for acute or breakthrough pain  · Long-acting opioids  usually last at least 8 hours  You can take them less often and they may be used for chronic pain  How do I use opioids safely? · Take prescribed opioids exactly as directed  Opioids come with directions based on the kind and how it is given  Talk to your healthcare provider or a pharmacist if you have any questions  Do not take more than the recommended amount  Too much can cause a life-threatening overdose  Do not continue to take it after your pain stops  You may develop tolerance  This means you keep needing higher doses to get the same effect  You may also develop opioid use disorder  This means you are not able to control your opioid use  · Do not give opioids to others or take opioids that belong to someone else  The kind or amount one person takes may not be right for another  The person you share them with may also be taking medicines that do not mix with opioids  He or she may drink alcohol or use other drugs that can cause life-threatening problems when mixed with opioids  · Do not mix opioids with other medicines or alcohol  The combination can cause an overdose, or cause you to stop breathing  Alcohol, sleeping pills, and medicines such as antihistamines can make you sleepy  A combination with opioids can lead to a coma      · Do not drive or operate heavy machinery after you use an opioid  You may feel drowsy or have trouble concentrating  You can injure yourself or others if you drive or use heavy machinery when you are not alert  Your provider or pharmacist can tell you how long to wait after a dose before you do these activities  · Talk to your healthcare provider if you have any side effects  Side effects include nausea, sleepiness, itching, and trouble thinking clearly  Your provider may need to make changes to the kind or amount of opioid you are taking  He or she can also help you find ways to prevent or relieve side effects  What can I do to manage constipation? Constipation is the most common side effect of opioid medicine  Constipation is when you have hard, dry bowel movements, or you go longer than usual between bowel movements  Tell your healthcare provider about all changes in your bowel movements while you are taking opioids  He or she may recommend laxative medicine to help you have a bowel movement  He or she may also change the kind of opioid you are taking, or change when you take it  The following are more ways you can prevent or relieve constipation:  · Drink liquids as directed  You may need to drink extra liquids to help soften and move your bowels  Ask how much liquid to drink each day and which liquids are best for you  · Eat high-fiber foods  This may help decrease constipation by adding bulk to your bowel movements  High-fiber foods include fruits, vegetables, whole-grain breads and cereals, and beans  Your healthcare provider or dietitian can help you create a high-fiber meal plan  Your provider may also recommend a fiber supplement if you cannot get enough fiber from food  · Exercise regularly  Regular physical activity can help stimulate your intestines  Walking is a good exercise to prevent or relieve constipation  Ask which exercises are best for you  · Schedule a time each day to have a bowel movement    This may help train your body to have regular bowel movements  Bend forward while you are on the toilet to help move the bowel movement out  Sit on the toilet for at least 10 minutes, even if you do not have a bowel movement  How do I store opioids safely? · Store opioids where others cannot easily get them  Keep them in a locked cabinet or secure area  Do not  keep them in a purse or other bag you carry with you  A person may be looking for something else and find the opioids  · Make sure opioids are stored out of the reach of children  A child can easily overdose on opioids  Opioids may look like candy to a small child  What is the best way to dispose of opioids? The laws vary by country and area  In the [place], the best way is to return the opioids through a take-back program  This program is offered by the [employer])  The following are options for using the program:  · Take the opioids to a REAL collection site  The site is often a law enforcement center  Call your local law enforcement center for scheduled take-back days in your area  You will be given information on where to go if the collection site is in a different location  · Take the opioids to an approved pharmacy or hospital   A pharmacy or hospital may be set up as a collection site  You will need to ask if it is a REAL collection site if you were not directed there  A pharmacy or doctor's office may not be able to take back opioids unless it is a REAL site  · Use a mail-back system  This means you are given containers to put the opioids into  You will then mail them in the containers  · Use a take-back drop box  This is a place to leave the opioids at any time  People and animals will not be able to get into the box  Your local law enforcement agency can tell you where to find a drop box in your area  What are some other safe ways to dispose of opioids? The medicine may come with disposal instructions   The instructions may vary depending on the brand of medicine you are using  Instructions may come in a Medication Guide, but not every medicine has one  You may instead get instructions from your pharmacy or doctor  Follow instructions carefully  The following are general guidelines to follow:  · Find out if you can flush the opioid  Some opioids can be flushed down the toilet or poured into the sink  You will need to contact authorities in your area to see if this is an option for you  The FDA also offers a list of medicines that are safe to flush down the toilet  You can check the list if you cannot get the information for your local area  · Ask your waste management company about rules for putting opioids in the trash  The company will be able to give you specific directions  Scratch out personal information on the original medicine label so it cannot be read  Then put it in the trash  Do not label the trash or put any information on it about the opioids  It should look like regular household trash so no one is tempted to look for the opioids  Keep the trash out of the reach of children and animals  Always make sure trash is secure  · Talk to officials if you live in a facility  If you live in a nursing home or assisted living center, talk to an official  The person will know the rules for your area  What are some other ways to manage pain? · Ask your healthcare provider about non-opioid medicines to control pain  Some medicines may even work better than opioids, depending on the cause of your pain  Nonprescription medicines include NSAIDs (such as ibuprofen) and acetaminophen  Prescription medicines include muscle relaxers, antidepressants, and steroids  · Pain may be managed without any medicines  Some ways to relieve pain include massage, aromatherapy, or meditation  Physical or occupational therapy may also help  Where can I find more information?    · Drug Enforcement Administration  [facility] [address]  Phone: 7- 332 - [phone]  Web Address: [facility] [email or web address]_disposal/    · [address]  and Drug Administration  [place] [address] , [address]  Phone: 4- [phone] - [phone]  Web Address: http://SafetyWeb/    Call your local emergency number (911 in the [address]), or have someone else call if:   · You have a seizure  · You cannot be woken  · You have trouble staying awake and your breathing is slow or shallow  · Your speech is slurred, or you are confused  · You are dizzy or stumble when you walk  When should I or someone close to me call my doctor? · You are extremely drowsy, or you have trouble staying awake or speaking  · You have pale or clammy skin  · You have blue fingernails or lips  · Your heartbeat is slower than normal     · You cannot stop vomiting  · You have questions or concerns about your condition or care  CARE AGREEMENT:   You have the right to help plan your care  Learn about your health condition and how it may be treated  Discuss treatment options with your healthcare providers to decide what care you want to receive  You always have the right to refuse treatment  The above information is an  only  It is not intended as medical advice for individual conditions or treatments  Talk to your doctor, nurse or pharmacist before following any medical regimen to see if it is safe and effective for you  © Copyright [employer] 2022 Information is for End User's use only and may not be sold, redistributed or otherwise used for commercial purposes   All illustrations and images included in CareNotes® are the copyrighted property of A D A M , Inc  or [facility] [employer]

## 2022-03-15 NOTE — TELEPHONE ENCOUNTER
Scheduled pt for [facility] for 4/7/22    Went over pre-procedure instructions below:  Nothing to eat or drink 1 hr prior to procedure  Need to arrange transportation  Proper clothing for procedure  No vaccines 2 weeks prior or after procedure  If ill or placed on antibiotics please call to reschedule

## 2022-04-07 ENCOUNTER — HOSPITAL ENCOUNTER (OUTPATIENT)
Facility: AMBULARY SURGERY CENTER | Age: 52
Setting detail: OUTPATIENT SURGERY
Discharge: HOME | End: 2022-04-07
Attending: ANESTHESIOLOGY | Admitting: ANESTHESIOLOGY
Payer: COMMERCIAL

## 2022-04-07 ENCOUNTER — APPOINTMENT (OUTPATIENT)
Dept: RADIOLOGY | Facility: HOSPITAL | Age: 52
End: 2022-04-07
Payer: COMMERCIAL

## 2022-04-07 VITALS
TEMPERATURE: 97.3 F | HEART RATE: 80 BPM | RESPIRATION RATE: 18 BRPM | DIASTOLIC BLOOD PRESSURE: 88 MMHG | SYSTOLIC BLOOD PRESSURE: 136 MMHG | OXYGEN SATURATION: 100 %

## 2022-04-07 PROCEDURE — G0260 INJ FOR SACROILIAC JT ANESTH: HCPCS

## 2022-04-07 PROCEDURE — 27096 INJECT SACROILIAC JOINT: CPT | Performed by: ANESTHESIOLOGY

## 2022-04-07 RX ORDER — METHYLPREDNISOLONE ACETATE 80 MG/ML
INJECTION, SUSPENSION INTRA-ARTICULAR; INTRALESIONAL; INTRAMUSCULAR; SOFT TISSUE AS NEEDED
Status: DISCONTINUED | OUTPATIENT
Start: 2022-04-07 | End: 2022-04-07 | Stop reason: HOSPADM

## 2022-04-07 RX ORDER — BUPIVACAINE HYDROCHLORIDE 2.5 MG/ML
INJECTION, SOLUTION EPIDURAL; INFILTRATION; INTRACAUDAL AS NEEDED
Status: DISCONTINUED | OUTPATIENT
Start: 2022-04-07 | End: 2022-04-07 | Stop reason: HOSPADM

## 2022-04-07 RX ORDER — LIDOCAINE WITH 8.4% SOD BICARB 0.9%(10ML)
SYRINGE (ML) INJECTION AS NEEDED
Status: DISCONTINUED | OUTPATIENT
Start: 2022-04-07 | End: 2022-04-07 | Stop reason: HOSPADM

## 2022-04-07 NOTE — DISCHARGE INSTRUCTIONS

## 2022-04-07 NOTE — OP NOTE
ATTENDING PHYSICIAN:  Leif Rabago MD     PROCEDURE: Left sacroiliac joint injection with steroid and local anesthetic under fluoroscopic guidance  PREPROCEDURE DIAGNOSIS:  Sacroiliitis  POSTPROCEDURE DIAGNOSIS: Sacroiliitis  ANESTHESIA:  Local     ESTIMATED BLOOD LOSS:  Minimal     COMPLICATIONS:  None  LOCATION:  [place] [address], [facility]  CONSENT:  Today's procedure, its potential benefits as well as its risks and potential side effects were reviewed  Discussed risks of the procedure including bleeding, infection, nerve irritation or damage, reactions to the medications, headache, failure of the pain to improve, and potential worsening of the pain were explained to the patient who verbalized understanding and who wished to proceed  Written informed consent was thereby obtained  DESCRIPTION OF THE PROCEDURE:  After written informed consent was obtained, the patient was taken to the fluoroscopy suite and placed in the prone position  Anatomical landmarks were identified by way of fluoroscopy in multiple views  The skin overlying the sacroiliac region was prepped using antiseptic and draped in the usual sterile fashion  Strict aseptic technique was utilized  The skin and subcutaneous tissues at the needle entry site were infiltrated with 5 mL of 1% preservative-free lidocaine using a 25-gauge 1-1/2-inch needle  A 22-gauge 3-1/2-inch needle was then incrementally advanced using multiple fluoroscopic views into the inferior posterior pole of the sacroiliac joint  Proper needle placement was confirmed with the aid of fluoroscopy in the AP and lateral views  After negative aspiration, contrast was injected which delineated the sacroiliac joint under fluoroscopy in the AP view  After negative aspiration was reconfirmed, a 3 mL mixture consisting of 2 mL of preservative-free 0 25% bupivacaine mixed with 1 mL of 80 mg/mL of Depo-Medrol was slowly injected      The patient tolerated the procedure well and all needles were removed with the tips intact  Hemostasis was maintained  There were no apparent paresthesias or complications  The skin was wiped clean and a Band-Aid was placed as appropriate  The patient was monitored for an appropriate period of time following the procedure and remained hemodynamically stable and neurovascularly intact following the procedure  The patient was ultimately discharged to home with supervision in good condition and instructed to call the office in a few days for an update or sooner as warranted  I was present and participated in all key and critical portions of this procedure      Manjeet Blackman MD  4/7/2022  4:03 PM

## 2022-04-07 NOTE — INTERVAL H&P NOTE
H&P reviewed  After examining the patient I find no changes in the patients condition since the H&P had been written      Vitals:    04/07/22 1527   BP: 138/87   Pulse: 86   Resp: 18   Temp: (!) 97 3 °F (36 3 °C)   SpO2: 99%

## 2022-04-12 NOTE — PROGRESS NOTES
Patient was last seen for occupational therapy on 03/02/2022  She has not scheduled any follow-up occupational therapy appointments to date  As a result, occupational therapy is discharged for this treatment cycle

## 2022-04-14 ENCOUNTER — TELEPHONE (OUTPATIENT)
Dept: PAIN MEDICINE | Facility: CLINIC | Age: 52
End: 2022-04-14

## 2022-05-09 ENCOUNTER — OFFICE VISIT (OUTPATIENT)
Dept: PAIN MEDICINE | Facility: CLINIC | Age: 52
End: 2022-05-09
Payer: COMMERCIAL

## 2022-05-09 VITALS
DIASTOLIC BLOOD PRESSURE: 85 MMHG | SYSTOLIC BLOOD PRESSURE: 146 MMHG | BODY MASS INDEX: 29.81 KG/M2 | WEIGHT: 162 LBS | HEIGHT: 62 IN | HEART RATE: 96 BPM | TEMPERATURE: 98.4 F

## 2022-05-09 DIAGNOSIS — F11.20 UNCOMPLICATED OPIOID DEPENDENCE (HCC): ICD-10-CM

## 2022-05-09 DIAGNOSIS — M51.16 LUMBAR DISC DISEASE WITH RADICULOPATHY: ICD-10-CM

## 2022-05-09 DIAGNOSIS — G89.4 CHRONIC PAIN SYNDROME: ICD-10-CM

## 2022-05-09 DIAGNOSIS — Z79.891 LONG-TERM CURRENT USE OF OPIATE ANALGESIC: Primary | ICD-10-CM

## 2022-05-09 DIAGNOSIS — M46.1 SACROILIITIS (HCC): ICD-10-CM

## 2022-05-09 PROCEDURE — 99214 OFFICE O/P EST MOD 30 MIN: CPT | Performed by: NURSE PRACTITIONER

## 2022-05-09 PROCEDURE — 80305 DRUG TEST PRSMV DIR OPT OBS: CPT | Performed by: NURSE PRACTITIONER

## 2022-05-09 PROCEDURE — 3008F BODY MASS INDEX DOCD: CPT | Performed by: NURSE PRACTITIONER

## 2022-05-09 RX ORDER — GABAPENTIN 300 MG/1
300 CAPSULE ORAL 2 TIMES DAILY
Qty: 60 CAPSULE | Refills: 2 | Status: SHIPPED | OUTPATIENT
Start: 2022-05-09 | End: 2022-06-27 | Stop reason: SDUPTHER

## 2022-05-09 RX ORDER — HYDROCODONE BITARTRATE AND ACETAMINOPHEN 7.5; 325 MG/1; MG/1
1 TABLET ORAL EVERY 8 HOURS PRN
Qty: 90 TABLET | Refills: 0 | Status: SHIPPED | OUTPATIENT
Start: 2022-06-05 | End: 2022-07-05

## 2022-05-09 RX ORDER — MELOXICAM 15 MG/1
15 TABLET ORAL DAILY
Qty: 30 TABLET | Refills: 2 | Status: SHIPPED | OUTPATIENT
Start: 2022-05-09 | End: 2022-07-18 | Stop reason: SDUPTHER

## 2022-05-09 RX ORDER — HYDROCODONE BITARTRATE AND ACETAMINOPHEN 7.5; 325 MG/1; MG/1
1 TABLET ORAL EVERY 8 HOURS PRN
Qty: 90 TABLET | Refills: 0 | Status: SHIPPED | OUTPATIENT
Start: 2022-07-05 | End: 2022-07-18 | Stop reason: SDUPTHER

## 2022-05-09 NOTE — PROGRESS NOTES
Pain Medicine Follow-Up Note    Assessment:  1  Long-term current use of opiate analgesic    2  Chronic pain syndrome    3  Sacroiliitis ([facility] [address] )    4  Uncomplicated opioid dependence ([facility] [address] )    5  Lumbar disc disease with radiculopathy        Plan:  Orders Placed This Encounter   Procedures    [facility] All Prescribed Meds     Order Specific Question:   [facility] Is GABAPENTIN prescribed? Answer:   Yes     Order Specific Question:   [facility] Is HYDROCODONE/APAP prescribed? Answer:   Yes     Order Specific Question:   [facility] Is MELOXICAM prescribed? Answer:    Yes    Amphetamines, Methamphetamines    Butalbital    Phenobarbital    Secobarbital    Temazepam    Alprazolam    Clonazepam    Diazepam    Lorazepam    Oxazepam    Gabapentin    Pregabalin    Cocaine    Heroin    Buprenorphine    Levorphanol    Meperidine    Naltrexone    Fentanyl    Methadone    Oxycodone    Oxymorphone    Tapentadol    Tramadol    Codeine, Hydrocodone, Hydropmorphone, Morphine    Bath Salts    Kratom    Spice    Methylphenidate    Phentermine    Validity Oxidant    Validity Creatinine    Validity pH    Validity Specific       New Medications Ordered This Visit   Medications    HYDROcodone-acetaminophen (NORCO) 7 5-325 mg per tablet     Sig: Take 1 tablet by mouth every 8 (eight) hours as needed for pain Max Daily Amount: 3 tablets     Dispense:  90 tablet     Refill:  0    HYDROcodone-acetaminophen (NORCO) 7 5-325 mg per tablet     Sig: Take 1 tablet by mouth every 8 (eight) hours as needed for pain Max Daily Amount: 3 tablets     Dispense:  90 tablet     Refill:  0    gabapentin (NEURONTIN) 300 mg capsule     Sig: Take 1 capsule (300 mg total) by mouth 2 (two) times a day     Dispense:  60 capsule     Refill:  2    meloxicam (MOBIC) 15 mg tablet     Sig: Take 1 tablet (15 mg total) by mouth daily Take with food     Dispense:  30 tablet     Refill:  2       My impressions and treatment recommendations were discussed in detail with the patient who verbalized understanding and had no further questions  Given that the patient continues to report overall reduced pain with improved level of functioning by 35-45% taking Norco 7 5/325, meloxicam 15 mg and gabapentin 300 mg twice a day we will continue her with these doses and frequencies  She reports no side effects from these medications except a new onset of dry eyes that is relieved from using eyedrops  Prescription refills were sent to the pharmacy on file with fill dates for the Norco of 06/05/2022 and 07/05/2022  [place] Prescription Drug Monitoring Program report was reviewed and was appropriate     A urine drug screen was collected at today's office visit as part of our medication management protocol  The point of care testing results were appropriate for what was being prescribed  The specimen will be sent for confirmatory testing  The drug screen is medically necessary because the patient is either dependent on opioid medication or is being considered for opioid medication therapy and the results could impact ongoing or future treatment  The drug screen is to evaluate for the presences or absence of prescribed, non-prescribed, and/or illicit drugs/substances  There are risks associated with opioid medications, including dependence, addiction and tolerance  The patient understands and agrees to use these medications only as prescribed  Potential side effects of the medications include, but are not limited to, constipation, drowsiness, addiction, impaired judgment and risk of fatal overdose if not taken as prescribed  The patient was warned against driving while taking sedation medications  Sharing medications is a felony  At this point in time, the patient is showing no signs of addiction, abuse, diversion or suicidal ideation  Follow-up is planned in 8 weeks time or sooner as warranted  Discharge instructions were provided  I personally saw and examined the patient and I agree with the above discussed plan of care  History of Present Illness:    Adeola Kapadia is a 49 y o  female who presents to [facility] for interval re-evaluation of the above stated pain complaints  The patient has a past medical and chronic pain history as outlined in the assessment section  She was last seen on 03/15/2022  Patient reports a pain score of 7/10 that is constant  She describes the quality of her pain as burning, sharp and shooting  She states that the pain is in her left buttock, hip and low back and radiates into her thigh  She also has left knee pain  Other than as stated above, the patient denies any interval changes in medications, medical condition, mental condition, symptoms, or allergies since the last office visit  Review of Systems:    Review of Systems   Respiratory: Negative for shortness of breath  Cardiovascular: Negative for chest pain  Gastrointestinal: Negative for constipation, diarrhea, nausea and vomiting  Musculoskeletal: Negative for arthralgias, gait problem, joint swelling and myalgias  Skin: Negative for rash  Neurological: Negative for dizziness, seizures and weakness  All other systems reviewed and are negative          Patient Active Problem List   Diagnosis    Anxiety    Poison ivy    S/P hip replacement, left    Chronic pain syndrome    Pain in left hip    Pain in both feet    Anxiety and depression    Heel pain, bilateral    Foot arch pain    Encounter for screening mammogram for breast cancer    Fatigue    Abnormal glucose    BMI 31 0-31 9,adult    Herpes zoster without complication    [id number] virus infection       Past Medical History:   Diagnosis Date    Chronic hip pain, left        Past Surgical History:   Procedure Laterality Date    ANKLE ARTHROPLASTY      BREAST LUMPECTOMY      BREAST SURGERY Bilateral     Enlargement    EPIDURAL BLOCK INJECTION Left 3/5/2021    Procedure: left L5 transforaminal epidural steroid injection ( 15432); Surgeon: Evon Moore MD;  Location: [facility] MAIN OR;  Service: Pain Management     FL INJECTION LEFT ELBOW (NON ARTHROGRAM)  4/7/2022    LAPAROSCOPY FOR ECTOPIC PREGNANCY      And Salpingectomy    ORTHOPEDIC SURGERY  9/2018    [place] INJECTION,SACROILIAC JOINT Left 4/7/2022    Procedure: SACROILIAC joint injection (57038 ); Surgeon: Evon Moore MD;  Location: [facility] MAIN OR;  Service: Pain Management     STEROID INJECTION HIP Left 4/16/2021    Procedure: Sacroiliac Joint Injection (50698);   Surgeon: Evon Moore MD;  Location: [facility] MAIN OR;  Service: Pain Management     TOTAL HIP ARTHROPLASTY Left 09/2018       Family History   Problem Relation Age of Onset    Breast cancer Mother     Arthritis Mother     Emphysema Mother     COPD Mother     Alcohol abuse Father         He has been clean for 2 years    No Known Problems Sister     No Known Problems Brother     No Known Problems Maternal Grandmother     No Known Problems Maternal Grandfather     No Known Problems Paternal Grandmother     No Known Problems Paternal Grandfather     Breast cancer Maternal Aunt     Cancer Maternal Aunt     No Known Problems Maternal Uncle     No Known Problems Paternal Aunt     No Known Problems Paternal Uncle        Social History     Occupational History    Not on file   Tobacco Use    Smoking status: Never Smoker    Smokeless tobacco: Never Used    Tobacco comment: Former smoker, per allscripts   Vaping Use    Vaping Use: Never used   Substance and Sexual Activity    Alcohol use: No     Comment: social drinker, per allscripts    Drug use: No    Sexual activity: Not Currently     Partners: Male     Birth control/protection: Post-menopausal         Current Outpatient Medications:     gabapentin (NEURONTIN) 300 mg capsule, Take 1 capsule (300 mg total) by mouth 2 (two) times a day, Disp: 61 capsule, Rfl: 2    [START ON 6/5/2022] HYDROcodone-acetaminophen (NORCO) 7 5-325 mg per tablet, Take 1 tablet by mouth every 8 (eight) hours as needed for pain Max Daily Amount: 3 tablets, Disp: 90 tablet, Rfl: 0    [START ON 7/5/2022] HYDROcodone-acetaminophen (NORCO) 7 5-325 mg per tablet, Take 1 tablet by mouth every 8 (eight) hours as needed for pain Max Daily Amount: 3 tablets, Disp: 90 tablet, Rfl: 0    meloxicam (MOBIC) 15 mg tablet, Take 1 tablet (15 mg total) by mouth daily Take with food, Disp: 30 tablet, Rfl: 2    ondansetron (Zofran ODT) 4 mg disintegrating tablet, Take 1 tablet (4 mg total) by mouth every 6 (six) hours as needed for nausea or vomiting, Disp: 10 tablet, Rfl: 0    Allergies   Allergen Reactions    Peach [Prunus Persica] Other (See Comments)     Closure of throat with peaches, apricots,plums , nectarine ,and almonds       Physical Exam:    /85   Pulse 96   Temp 98 4 °F (36 9 °C)   Ht 5' 2" (1 575 m)   Wt 73 5 kg (162 lb)   BMI 29 63 kg/m²     Constitutional:overweight  Eyes:anicteric  HEENT:grossly intact  Neck:supple, symmetric, trachea midline and no masses   Pulmonary:even and unlabored  Cardiovascular:No edema or pitting edema present  Skin:Normal without rashes or lesions and well hydrated  Psychiatric:Mood and affect appropriate  Neurologic:Cranial Nerves II-XII grossly intact  Musculoskeletal:antalgic      Imaging  No orders to display         Orders Placed This Encounter   Procedures    [facility] All Prescribed Meds    Amphetamines, Methamphetamines    Butalbital    Phenobarbital    Secobarbital    Temazepam    Alprazolam    Clonazepam    Diazepam    Lorazepam    Oxazepam    Gabapentin    Pregabalin    Cocaine    Heroin    Buprenorphine    Levorphanol    Meperidine    Naltrexone    Fentanyl    Methadone    Oxycodone    Oxymorphone    Tapentadol    Tramadol    Codeine, Hydrocodone, Hydropmorphone, Morphine    Bath Salts    Kratom    Spice    Methylphenidate    Phentermine    Validity Oxidant    Validity Creatinine    Validity pH    Validity Specific

## 2022-05-09 NOTE — PATIENT INSTRUCTIONS
Opioid Safety   WHAT YOU NEED TO KNOW:   What do I need to know about opioid safety? Safety includes the correct use, storage, and disposal of opioids  Examples of opioid pain medicines are oxycodone, morphine, fentanyl, and codeine  How are opioids given? Opioids can be given as a pill, patch, or suppository  They can also be given as an injection into a vein, near a nerve, or into a joint  Your prescription may include one or both of the following:  · Short-acting opioids  work fast and relieve pain for about 3 to 6 hours  They are often used for acute or breakthrough pain  · Long-acting opioids  usually last at least 8 hours  You can take them less often and they may be used for chronic pain  How do I use opioids safely? · Take prescribed opioids exactly as directed  Opioids come with directions based on the kind and how it is given  Talk to your healthcare provider or a pharmacist if you have any questions  Do not take more than the recommended amount  Too much can cause a life-threatening overdose  Do not continue to take it after your pain stops  You may develop tolerance  This means you keep needing higher doses to get the same effect  You may also develop opioid use disorder  This means you are not able to control your opioid use  · Do not give opioids to others or take opioids that belong to someone else  The kind or amount one person takes may not be right for another  The person you share them with may also be taking medicines that do not mix with opioids  He or she may drink alcohol or use other drugs that can cause life-threatening problems when mixed with opioids  · Do not mix opioids with other medicines or alcohol  The combination can cause an overdose, or cause you to stop breathing  Alcohol, sleeping pills, and medicines such as antihistamines can make you sleepy  A combination with opioids can lead to a coma      · Do not drive or operate heavy machinery after you use an opioid  You may feel drowsy or have trouble concentrating  You can injure yourself or others if you drive or use heavy machinery when you are not alert  Your provider or pharmacist can tell you how long to wait after a dose before you do these activities  · Talk to your healthcare provider if you have any side effects  Side effects include nausea, sleepiness, itching, and trouble thinking clearly  Your provider may need to make changes to the kind or amount of opioid you are taking  He or she can also help you find ways to prevent or relieve side effects  What can I do to manage constipation? Constipation is the most common side effect of opioid medicine  Constipation is when you have hard, dry bowel movements, or you go longer than usual between bowel movements  Tell your healthcare provider about all changes in your bowel movements while you are taking opioids  He or she may recommend laxative medicine to help you have a bowel movement  He or she may also change the kind of opioid you are taking, or change when you take it  The following are more ways you can prevent or relieve constipation:  · Drink liquids as directed  You may need to drink extra liquids to help soften and move your bowels  Ask how much liquid to drink each day and which liquids are best for you  · Eat high-fiber foods  This may help decrease constipation by adding bulk to your bowel movements  High-fiber foods include fruits, vegetables, whole-grain breads and cereals, and beans  Your healthcare provider or dietitian can help you create a high-fiber meal plan  Your provider may also recommend a fiber supplement if you cannot get enough fiber from food  · Exercise regularly  Regular physical activity can help stimulate your intestines  Walking is a good exercise to prevent or relieve constipation  Ask which exercises are best for you  · Schedule a time each day to have a bowel movement    This may help train your body to have regular bowel movements  Bend forward while you are on the toilet to help move the bowel movement out  Sit on the toilet for at least 10 minutes, even if you do not have a bowel movement  How do I store opioids safely? · Store opioids where others cannot easily get them  Keep them in a locked cabinet or secure area  Do not  keep them in a purse or other bag you carry with you  A person may be looking for something else and find the opioids  · Make sure opioids are stored out of the reach of children  A child can easily overdose on opioids  Opioids may look like candy to a small child  What is the best way to dispose of opioids? The laws vary by country and area  In the [place], the best way is to return the opioids through a take-back program  This program is offered by the [employer])  The following are options for using the program:  · Take the opioids to a REAL collection site  The site is often a law enforcement center  Call your local law enforcement center for scheduled take-back days in your area  You will be given information on where to go if the collection site is in a different location  · Take the opioids to an approved pharmacy or hospital   A pharmacy or hospital may be set up as a collection site  You will need to ask if it is a REAL collection site if you were not directed there  A pharmacy or doctor's office may not be able to take back opioids unless it is a REAL site  · Use a mail-back system  This means you are given containers to put the opioids into  You will then mail them in the containers  · Use a take-back drop box  This is a place to leave the opioids at any time  People and animals will not be able to get into the box  Your local law enforcement agency can tell you where to find a drop box in your area  What are some other safe ways to dispose of opioids? The medicine may come with disposal instructions   The instructions may vary depending on the brand of medicine you are using  Instructions may come in a Medication Guide, but not every medicine has one  You may instead get instructions from your pharmacy or doctor  Follow instructions carefully  The following are general guidelines to follow:  · Find out if you can flush the opioid  Some opioids can be flushed down the toilet or poured into the sink  You will need to contact authorities in your area to see if this is an option for you  The FDA also offers a list of medicines that are safe to flush down the toilet  You can check the list if you cannot get the information for your local area  · Ask your waste management company about rules for putting opioids in the trash  The company will be able to give you specific directions  Scratch out personal information on the original medicine label so it cannot be read  Then put it in the trash  Do not label the trash or put any information on it about the opioids  It should look like regular household trash so no one is tempted to look for the opioids  Keep the trash out of the reach of children and animals  Always make sure trash is secure  · Talk to officials if you live in a facility  If you live in a nursing home or assisted living center, talk to an official  The person will know the rules for your area  What are some other ways to manage pain? · Ask your healthcare provider about non-opioid medicines to control pain  Some medicines may even work better than opioids, depending on the cause of your pain  Nonprescription medicines include NSAIDs (such as ibuprofen) and acetaminophen  Prescription medicines include muscle relaxers, antidepressants, and steroids  · Pain may be managed without any medicines  Some ways to relieve pain include massage, aromatherapy, or meditation  Physical or occupational therapy may also help  Where can I find more information?    · Drug Enforcement Administration  [address]  Phone: 4- 509 - [phone]  Web Address: [facility] gov/drug_disposal/    · [address]  and Drug Administration  [place] , [address]  Phone: 6- 349 - [phone]  Web Address: http://[employer]/    Call your local emergency number (911 in the [address]), or have someone else call if:   · You have a seizure  · You cannot be woken  · You have trouble staying awake and your breathing is slow or shallow  · Your speech is slurred, or you are confused  · You are dizzy or stumble when you walk  When should I or someone close to me call my doctor? · You are extremely drowsy, or you have trouble staying awake or speaking  · You have pale or clammy skin  · You have blue fingernails or lips  · Your heartbeat is slower than normal     · You cannot stop vomiting  · You have questions or concerns about your condition or care  CARE AGREEMENT:   You have the right to help plan your care  Learn about your health condition and how it may be treated  Discuss treatment options with your healthcare providers to decide what care you want to receive  You always have the right to refuse treatment  The above information is an  only  It is not intended as medical advice for individual conditions or treatments  Talk to your doctor, nurse or pharmacist before following any medical regimen to see if it is safe and effective for you  © Copyright [employer] 2022 Information is for End User's use only and may not be sold, redistributed or otherwise used for commercial purposes   All illustrations and images included in CareNotes® are the copyrighted property of [employer]  or [facility] [employer]

## 2022-05-11 LAB
6MAM UR QL CFM: NEGATIVE NG/ML
7AMINOCLONAZEPAM UR QL CFM: NEGATIVE NG/ML
A-OH ALPRAZ UR QL CFM: NEGATIVE NG/ML
ACCEPTABLE CREAT UR QL: NORMAL MG/DL
ACCEPTIBLE SP GR UR QL: NORMAL
AMPHET UR QL CFM: NEGATIVE NG/ML
BUPRENORPHINE UR QL CFM: NEGATIVE NG/ML
BUTALBITAL UR QL CFM: NEGATIVE NG/ML
BZE UR QL CFM: NEGATIVE NG/ML
CODEINE UR QL CFM: NEGATIVE NG/ML
EDDP UR QL CFM: NEGATIVE NG/ML
EUTYLONE UR QL: NEGATIVE NG/ML
FENTANYL UR QL CFM: NEGATIVE NG/ML
GLIADIN IGG SER IA-ACNC: NEGATIVE NG/ML
HYDROCODONE UR QL CFM: NORMAL NG/ML
HYDROMORPHONE UR QL CFM: NORMAL NG/ML
LORAZEPAM UR QL CFM: NEGATIVE NG/ML
ME-PHENIDATE UR QL CFM: NEGATIVE NG/ML
MEPERIDINE UR QL CFM: NEGATIVE NG/ML
METHADONE UR QL CFM: NEGATIVE NG/ML
METHAMPHET UR QL CFM: NEGATIVE NG/ML
MORPHINE UR QL CFM: NEGATIVE NG/ML
NALTREXONE UR QL CFM: NEGATIVE NG/ML
NITRITE UR QL: NORMAL UG/ML
NORBUPRENORPHINE UR QL CFM: NEGATIVE NG/ML
NORDIAZEPAM UR QL CFM: NEGATIVE NG/ML
NORFENTANYL UR QL CFM: NEGATIVE NG/ML
NORHYDROCODONE UR QL CFM: NORMAL NG/ML
NORMEPERIDINE UR QL CFM: NEGATIVE NG/ML
NOROXYCODONE UR QL CFM: NEGATIVE NG/ML
OXAZEPAM UR QL CFM: NEGATIVE NG/ML
OXYCODONE UR QL CFM: NEGATIVE NG/ML
OXYMORPHONE UR QL CFM: NEGATIVE NG/ML
OXYMORPHONE UR QL CFM: NEGATIVE NG/ML
PHENOBARB UR QL CFM: NEGATIVE NG/ML
RESULT ALL_PRESCRIBED MEDS AND SPECIAL INSTRUCTIONS: NORMAL
SECOBARBITAL UR QL CFM: NEGATIVE NG/ML
SL AMB 3-METHYL-FENTANYL QUANTIFICATION: NORMAL NG/ML
SL AMB 4-ANPP QUANTIFICATION: NORMAL NG/ML
SL AMB 4-FIBF QUANTIFICATION: NORMAL NG/ML
SL AMB 5F-ADB-M7 METABOLITE QUANTIFICATION: NEGATIVE NG/ML
SL AMB 7-OH-MITRAGYNINE (KRATOM ALKALOID) QUANTIFICATION: NEGATIVE NG/ML
SL AMB AB-FUBINACA-M3 METABOLITE QUANTIFICATION: NEGATIVE NG/ML
SL AMB ACETYL FENTANYL QUANTIFICATION: NORMAL NG/ML
SL AMB ACETYL NORFENTANYL QUANTIFICATION: NORMAL NG/ML
SL AMB ACRYL FENTANYL QUANTIFICATION: NORMAL NG/ML
SL AMB BUTRYL FENTANYL QUANTIFICATION: NORMAL NG/ML
SL AMB CARFENTANIL QUANTIFICATION: NORMAL NG/ML
SL AMB CYCLOPROPYL FENTANYL QUANTIFICATION: NORMAL NG/ML
SL AMB DEXTRORPHAN (DEXTROMETHORPHAN METABOLITE) QUANT: NEGATIVE NG/ML
SL AMB FURANYL FENTANYL QUANTIFICATION: NORMAL NG/ML
SL AMB GABAPENTIN QUANTIFICATION: NORMAL
SL AMB JWH018 METABOLITE QUANTIFICATION: NEGATIVE NG/ML
SL AMB JWH073 METABOLITE QUANTIFICATION: NEGATIVE NG/ML
SL AMB MDMB-FUBINACA-M1 METABOLITE QUANTIFICATION: NEGATIVE NG/ML
SL AMB METHOXYACETYL FENTANYL QUANTIFICATION: NORMAL NG/ML
SL AMB METHYLONE QUANTIFICATION: NEGATIVE NG/ML
SL AMB N-DESMETHYL U-47700 QUANTIFICATION: NORMAL NG/ML
SL AMB N-DESMETHYL-TRAMADOL QUANTIFICATION: NEGATIVE NG/ML
SL AMB PHENTERMINE QUANTIFICATION: NEGATIVE NG/ML
SL AMB PREGABALIN QUANTIFICATION: NEGATIVE
SL AMB RCS4 METABOLITE QUANTIFICATION: NEGATIVE NG/ML
SL AMB RITALINIC ACID QUANTIFICATION: NEGATIVE NG/ML
SL AMB U-47700 QUANTIFICATION: NORMAL NG/ML
SMOOTH MUSCLE AB TITR SER IF: NEGATIVE NG/ML
SPECIMEN DRAWN SERPL: NEGATIVE NG/ML
SPECIMEN PH ACCEPTABLE UR: NORMAL
TAPENTADOL UR QL CFM: NEGATIVE NG/ML
TEMAZEPAM UR QL CFM: NEGATIVE NG/ML
TEMAZEPAM UR QL CFM: NEGATIVE NG/ML
TRAMADOL UR QL CFM: NEGATIVE NG/ML
URATE/CREAT 24H UR: NEGATIVE NG/ML

## 2022-06-10 ENCOUNTER — OFFICE VISIT (OUTPATIENT)
Dept: OBGYN CLINIC | Facility: CLINIC | Age: 52
End: 2022-06-10
Payer: COMMERCIAL

## 2022-06-10 VITALS
WEIGHT: 175.8 LBS | BODY MASS INDEX: 32.35 KG/M2 | DIASTOLIC BLOOD PRESSURE: 88 MMHG | HEIGHT: 62 IN | HEART RATE: 62 BPM | SYSTOLIC BLOOD PRESSURE: 126 MMHG

## 2022-06-10 DIAGNOSIS — M17.12 PRIMARY OSTEOARTHRITIS OF LEFT KNEE: Primary | ICD-10-CM

## 2022-06-10 DIAGNOSIS — G89.29 CHRONIC PAIN OF LEFT KNEE: ICD-10-CM

## 2022-06-10 DIAGNOSIS — M25.562 CHRONIC PAIN OF LEFT KNEE: ICD-10-CM

## 2022-06-10 PROCEDURE — 20610 DRAIN/INJ JOINT/BURSA W/O US: CPT | Performed by: ORTHOPAEDIC SURGERY

## 2022-06-10 PROCEDURE — 3008F BODY MASS INDEX DOCD: CPT | Performed by: ORTHOPAEDIC SURGERY

## 2022-06-10 PROCEDURE — 99214 OFFICE O/P EST MOD 30 MIN: CPT | Performed by: ORTHOPAEDIC SURGERY

## 2022-06-10 RX ORDER — BUPIVACAINE HYDROCHLORIDE 5 MG/ML
6 INJECTION, SOLUTION EPIDURAL; INTRACAUDAL
Status: COMPLETED | OUTPATIENT
Start: 2022-06-10 | End: 2022-06-10

## 2022-06-10 RX ORDER — TRIAMCINOLONE ACETONIDE 40 MG/ML
80 INJECTION, SUSPENSION INTRA-ARTICULAR; INTRAMUSCULAR
Status: COMPLETED | OUTPATIENT
Start: 2022-06-10 | End: 2022-06-10

## 2022-06-10 RX ADMIN — BUPIVACAINE HYDROCHLORIDE 6 ML: 5 INJECTION, SOLUTION EPIDURAL; INTRACAUDAL at 09:31

## 2022-06-10 RX ADMIN — TRIAMCINOLONE ACETONIDE 80 MG: 40 INJECTION, SUSPENSION INTRA-ARTICULAR; INTRAMUSCULAR at 09:31

## 2022-06-10 NOTE — PROGRESS NOTES
Assessment/Plan:  1  Primary osteoarthritis of left knee     2  Chronic pain of left knee       Scribe Attestation    I,:  Eduardo Choudhury am acting as a scribe while in the presence of the attending physician :       I,:  Nuria Sanchez, DO personally performed the services described in this documentation    as scribed in my presence :         Portia Tipton is a pleasant 49year old who presents to the office today for a follow-up evaluation of her left knee osteoarthritis  She underwent a left knee aspiration and corticosteroid injection on 1/15/2021 which provided her with about 1 year of pain relief  A repeat left knee corticosteroid injection was offered to the patient at today's visit  We discussed the risks and benefits of corticosteroid injection today in the office and she did elect to proceed  The left knee corticosteroid injection was administered without issue and well tolerated by the patient  This was done under sterile technique  Post injection instructions were provided  She understands can be repeated no sooner than three months  I will follow-up with her as needed  She understood and had further questions  Large joint arthrocentesis: L knee  Universal Protocol:  Consent: Verbal consent obtained  Risks and benefits: risks, benefits and alternatives were discussed  Consent given by: patient  Time out: Immediately prior to procedure a "time out" was called to verify the correct patient, procedure, equipment, support staff and site/side marked as required    Timeout called at: 6/10/2022 9:30 AM   Site marked: the operative site was marked  Patient identity confirmed: verbally with patient    Supporting Documentation  Indications: pain   Procedure Details  Location: knee - L knee  Preparation: Patient was prepped and draped in the usual sterile fashion  Needle size: 20 G  Ultrasound guidance: no  Approach: anterolateral  Medications administered: 6 mL bupivacaine (PF) 0 5 %; 80 mg triamcinolone acetonide 40 mg/mL    Patient tolerance: patient tolerated the procedure well with no immediate complications  Dressing:  Sterile dressing applied          Subjective: Follow-up evaluation of left knee    Patient ID: Adriano Garcia is a 49 y o  female who presents to the office today for a follow-up evaluation of her left knee osteoarthritis  She underwent a left knee aspiration and corticosteroid injection on 1/15/2021  She states that she experienced pain relief for about 1 year  She states that over the past couple weeks she noticed swelling and pain about her left knee  She denies any new mechanism of injury or trauma  She states that she will experience a daily intermittent sharp pain  She rates her pain as a 7/10  She localizes her pain over the anterior and medial aspects of her left knee  She states that her pain is exacerbated with weight-bearing activity such as walking and ambulating up and down stairs  She states that she will use ice, Mobic and gabapentin to help alleviate her pain  She states that she would like a repeat corticosteroid injection at today's visit  Review of Systems   Constitutional: Negative for chills, fever and unexpected weight change  HENT: Negative for hearing loss, nosebleeds and sore throat  Eyes: Negative for pain, redness and visual disturbance  Respiratory: Negative for cough, shortness of breath and wheezing  Cardiovascular: Negative for chest pain, palpitations and leg swelling  Gastrointestinal: Negative for abdominal pain, nausea and vomiting  Endocrine: Negative for polyphagia and polyuria  Genitourinary: Negative for dysuria and hematuria  Musculoskeletal: Positive for arthralgias, joint swelling and myalgias  Skin: Negative for rash and wound  Neurological: Negative for dizziness, numbness and headaches  Psychiatric/Behavioral: Negative for confusion and suicidal ideas  The patient is not nervous/anxious            Past Medical History:   Diagnosis Date    Chronic hip pain, left        Past Surgical History:   Procedure Laterality Date    ANKLE ARTHROPLASTY      BREAST LUMPECTOMY      BREAST SURGERY Bilateral     Enlargement    EPIDURAL BLOCK INJECTION Left 3/5/2021    Procedure: left L5 transforaminal epidural steroid injection ( 01135); Surgeon: Chetan Zacarias MD;  Location[facility] MAIN OR;  Service: Pain Management     FL INJECTION LEFT ELBOW (NON ARTHROGRAM)  4/7/2022    LAPAROSCOPY FOR ECTOPIC PREGNANCY      And Salpingectomy    ORTHOPEDIC SURGERY  9/2018    AK INJECTION,SACROILIAC JOINT Left 4/7/2022    Procedure: SACROILIAC joint injection (38870 ); Surgeon: Chetan Zacarias MD;  Location: [facility] MAIN OR;  Service: Pain Management     STEROID INJECTION HIP Left 4/16/2021    Procedure: Sacroiliac Joint Injection (94762);   Surgeon: Chetan Zacarias MD;  Location: [facility] MAIN OR;  Service: Pain Management     TOTAL HIP ARTHROPLASTY Left 09/2018       Family History   Problem Relation Age of Onset    Breast cancer Mother     Arthritis Mother     Emphysema Mother     COPD Mother     Alcohol abuse Father         He has been clean for 2 years    No Known Problems Sister     No Known Problems Brother     No Known Problems Maternal Grandmother     No Known Problems Maternal Grandfather     No Known Problems Paternal Grandmother     No Known Problems Paternal Grandfather     Breast cancer Maternal Aunt     Cancer Maternal Aunt     No Known Problems Maternal Uncle     No Known Problems Paternal Aunt     No Known Problems Paternal Uncle        Social History     Occupational History    Not on file   Tobacco Use    Smoking status: Never Smoker    Smokeless tobacco: Never Used    Tobacco comment: Former smoker, per allscripts   Vaping Use    Vaping Use: Never used   Substance and Sexual Activity    Alcohol use: No     Comment: social drinker, per allscripts    Drug use: No    Sexual activity: Not Currently     Partners: Male Birth control/protection: Post-menopausal         Current Outpatient Medications:     gabapentin (NEURONTIN) 300 mg capsule, Take 1 capsule (300 mg total) by mouth 2 (two) times a day, Disp: 60 capsule, Rfl: 2    HYDROcodone-acetaminophen (NORCO) 7 5-325 mg per tablet, Take 1 tablet by mouth every 8 (eight) hours as needed for pain Max Daily Amount: 3 tablets, Disp: 90 tablet, Rfl: 0    [START ON 7/5/2022] HYDROcodone-acetaminophen (NORCO) 7 5-325 mg per tablet, Take 1 tablet by mouth every 8 (eight) hours as needed for pain Max Daily Amount: 3 tablets, Disp: 90 tablet, Rfl: 0    meloxicam (MOBIC) 15 mg tablet, Take 1 tablet (15 mg total) by mouth daily Take with food, Disp: 30 tablet, Rfl: 2    ondansetron (Zofran ODT) 4 mg disintegrating tablet, Take 1 tablet (4 mg total) by mouth every 6 (six) hours as needed for nausea or vomiting, Disp: 10 tablet, Rfl: 0    Allergies   Allergen Reactions    Peach [Prunus Persica] Other (See Comments)     Closure of throat with peaches, apricots,plums , nectarine ,and almonds       Objective:  Vitals:    06/10/22 0859   BP: 126/88   Pulse: 62       Body mass index is 32 15 kg/m²  Left Knee Exam     Tenderness   The patient is experiencing tenderness in the medial joint line, patella and lateral joint line  Range of Motion   Extension: 0   Flexion: 120     Tests   Varus: negative Valgus: negative    Other   Erythema: absent  Scars: absent  Sensation: normal  Pulse: present  Swelling: none  Effusion: no effusion present    Comments:  No erythema, warmth or signs of infections  Parapatellar crepitus appreciated  +PFG  Knee is stable on ligamentous exam at 0, 30, 90 degrees  Neurovascularly intact           Observations   Left Knee   Negative for effusion  Physical Exam  Vitals reviewed  Constitutional:       Appearance: Normal appearance  She is well-developed  HENT:      Head: Normocephalic and atraumatic     Eyes:      General:         Right eye: No discharge  Left eye: No discharge  Extraocular Movements: Extraocular movements intact  Conjunctiva/sclera: Conjunctivae normal    Cardiovascular:      Rate and Rhythm: Normal rate  Pulmonary:      Effort: Pulmonary effort is normal  No respiratory distress  Musculoskeletal:      Cervical back: Normal range of motion and neck supple  Left knee: No effusion  Skin:     General: Skin is warm and dry  Neurological:      General: No focal deficit present  Mental Status: She is alert and oriented to person, place, and time  Psychiatric:         Mood and Affect: Mood normal          Behavior: Behavior normal            I have personally reviewed pertinent films in PACS  No new images reviewed today

## 2022-06-27 DIAGNOSIS — M46.1 SACROILIITIS (HCC): ICD-10-CM

## 2022-06-27 RX ORDER — GABAPENTIN 300 MG/1
300 CAPSULE ORAL 2 TIMES DAILY
Qty: 60 CAPSULE | Refills: 2 | Status: SHIPPED | OUTPATIENT
Start: 2022-06-27

## 2022-07-11 ENCOUNTER — TELEPHONE (OUTPATIENT)
Dept: PAIN MEDICINE | Facility: CLINIC | Age: 52
End: 2022-07-11

## 2022-07-11 NOTE — TELEPHONE ENCOUNTER
Spoke to pt offering her an 11:45 am appt with Dr Dino Nuñez today (there was a cancellation) pt couldn't make it

## 2022-07-18 ENCOUNTER — OFFICE VISIT (OUTPATIENT)
Dept: PAIN MEDICINE | Facility: CLINIC | Age: 52
End: 2022-07-18
Payer: COMMERCIAL

## 2022-07-18 VITALS
HEIGHT: 62 IN | HEART RATE: 75 BPM | WEIGHT: 172 LBS | SYSTOLIC BLOOD PRESSURE: 135 MMHG | BODY MASS INDEX: 31.65 KG/M2 | TEMPERATURE: 98.2 F | DIASTOLIC BLOOD PRESSURE: 75 MMHG | RESPIRATION RATE: 19 BRPM

## 2022-07-18 DIAGNOSIS — M25.552 PAIN IN LEFT HIP: ICD-10-CM

## 2022-07-18 DIAGNOSIS — G89.4 CHRONIC PAIN SYNDROME: Primary | ICD-10-CM

## 2022-07-18 DIAGNOSIS — M25.562 CHRONIC PAIN OF LEFT KNEE: ICD-10-CM

## 2022-07-18 DIAGNOSIS — Z96.642 S/P HIP REPLACEMENT, LEFT: ICD-10-CM

## 2022-07-18 DIAGNOSIS — G89.29 CHRONIC PAIN OF LEFT KNEE: ICD-10-CM

## 2022-07-18 PROCEDURE — 99214 OFFICE O/P EST MOD 30 MIN: CPT | Performed by: ANESTHESIOLOGY

## 2022-07-18 RX ORDER — HYDROCODONE BITARTRATE AND ACETAMINOPHEN 7.5; 325 MG/1; MG/1
1 TABLET ORAL EVERY 8 HOURS PRN
Qty: 90 TABLET | Refills: 0 | Status: SHIPPED | OUTPATIENT
Start: 2022-08-08 | End: 2022-09-07

## 2022-07-18 RX ORDER — MELOXICAM 15 MG/1
15 TABLET ORAL DAILY
Qty: 30 TABLET | Refills: 2 | Status: SHIPPED | OUTPATIENT
Start: 2022-07-18 | End: 2022-09-29 | Stop reason: SDUPTHER

## 2022-07-18 RX ORDER — HYDROCODONE BITARTRATE AND ACETAMINOPHEN 7.5; 325 MG/1; MG/1
1 TABLET ORAL EVERY 8 HOURS PRN
Qty: 90 TABLET | Refills: 0 | Status: SHIPPED | OUTPATIENT
Start: 2022-09-07 | End: 2022-09-29 | Stop reason: SDUPTHER

## 2022-07-18 NOTE — PROGRESS NOTES
Scribe Attestation    I,:  LUZ MARIA Velazquez am acting as a scribe while in the presence of the attending physician :       I,:  Bridgette Trimble MD personally performed the services described in this documentation    as scribed in my presence :           Pain Medicine Follow-Up Note    Assessment:  1  Chronic pain syndrome    2  S/P hip replacement, left    3  Pain in left hip    4  Chronic pain of left knee        Plan:  New Medications Ordered This Visit   Medications    meloxicam (MOBIC) 15 mg tablet     Sig: Take 1 tablet (15 mg total) by mouth daily Take with food     Dispense:  30 tablet     Refill:  2    HYDROcodone-acetaminophen (NORCO) 7 5-325 mg per tablet     Sig: Take 1 tablet by mouth every 8 (eight) hours as needed for pain Do not fill until 9/7/2022 Max Daily Amount: 3 tablets     Dispense:  90 tablet     Refill:  0    HYDROcodone-acetaminophen (NORCO) 7 5-325 mg per tablet     Sig: Take 1 tablet by mouth every 8 (eight) hours as needed for pain Do not fill until 8/8/2022 Max Daily Amount: 3 tablets     Dispense:  90 tablet     Refill:  0     My impressions and treatment recommendations were discussed in detail with the patient who verbalized understanding and had no further questions  The patient is reporting overall reduced pain and improved level functioning without significant side  I felt it reasonable to continue the patient on meloxicam 15 mg take 1 tablet daily and hydrocodone/acetaminophen 7 5/325 mg 1 tablet 3 times daily as needed for pain  I sent the prescriptions to the pharmacy dated 08/08/2022 and 09/07/2022  [place] Prescription Drug Monitoring Program report was reviewed and was appropriate     There are risks associated with opioid medications, including dependence, addiction and tolerance  The patient understands and agrees to use these medications only as prescribed   Potential side effects of the medications include, but are not limited to, constipation, drowsiness, addiction, impaired judgment and risk of fatal overdose if not taken as prescribed  The patient was warned against driving while taking sedation medications  Sharing medications is a felony  At this point in time, the patient is showing no signs of addiction, abuse, diversion or suicidal ideation  Follow-up is planned in 8 weeks time or sooner as warranted  Discharge instructions were provided  I personally saw and examined the patient and I agree with the above discussed plan of care  History of Present Illness:    Sabas Shelley is a 49 y o  female who presents to [facility] for interval re-evaluation of the above stated pain complaints  The patient has a past medical and chronic pain history as outlined in the assessment section  She was last seen on 05/09/2022  Patient presents the office stating that her pain symptoms are the same with a pain score of 8/10 on the verbal numeric pain scale  Patient states that her pain is worse at night  Patient states that her pain is constant in nature  The quality of her pain is sharp, pressure-like and shooting  Patient states that she feels that her current pain regimen is providing enough relief to make a real difference in her life reporting 60% improvement in pain  Pain Contract Signed:  08/02/2021  Last Urine Drug Screen:  05/11/2022    Other than as stated above, the patient denies any interval changes in medications, medical condition, mental condition, symptoms, or allergies since the last office visit  Review of Systems:    Review of Systems   Constitutional: Negative for unexpected weight change  HENT: Negative for ear pain  Eyes: Negative for visual disturbance  Respiratory: Negative for shortness of breath and wheezing  Gastrointestinal: Negative for abdominal pain  Musculoskeletal: Positive for back pain and gait problem  Decreased ROM, joint stiffness, hip knee and leg  pain Neurological: Positive for weakness and numbness  Psychiatric/Behavioral: Negative for decreased concentration  Patient Active Problem List   Diagnosis    Anxiety    Poison ivy    S/P hip replacement, left    Chronic pain syndrome    Pain in left hip    Pain in both feet    Anxiety and depression    Heel pain, bilateral    Foot arch pain    Encounter for screening mammogram for breast cancer    Fatigue    Abnormal glucose    BMI 31 0-31 9,adult    Herpes zoster without complication    [id number] virus infection       Past Medical History:   Diagnosis Date    Chronic hip pain, left        Past Surgical History:   Procedure Laterality Date    ANKLE ARTHROPLASTY      BREAST LUMPECTOMY      BREAST SURGERY Bilateral     Enlargement    EPIDURAL BLOCK INJECTION Left 3/5/2021    Procedure: left L5 transforaminal epidural steroid injection ( 43693); Surgeon: Ying Morales MD;  Location: [facility] MAIN OR;  Service: Pain Management     FL INJECTION LEFT ELBOW (NON ARTHROGRAM)  4/7/2022    LAPAROSCOPY FOR ECTOPIC PREGNANCY      And Salpingectomy    ORTHOPEDIC SURGERY  9/2018    [place] INJECTION,SACROILIAC JOINT Left 4/7/2022    Procedure: SACROILIAC joint injection (24142 ); Surgeon: Ying Morales MD;  Location: [facility] MAIN OR;  Service: Pain Management     STEROID INJECTION HIP Left 4/16/2021    Procedure: Sacroiliac Joint Injection ([id number]);   Surgeon: Ying Morales MD;  Location: [facility] MAIN OR;  Service: Pain Management     TOTAL HIP ARTHROPLASTY Left 09/2018       Family History   Problem Relation Age of Onset   Earna Simona Breast cancer Mother     Arthritis Mother     Emphysema Mother     COPD Mother     Alcohol abuse Father         He has been clean for 2 years    No Known Problems Sister     No Known Problems Brother     No Known Problems Maternal Grandmother     No Known Problems Maternal Grandfather     No Known Problems Paternal Grandmother     No Known Problems Paternal Grandfather     Breast cancer Maternal Aunt     Cancer Maternal Aunt     No Known Problems Maternal Uncle     No Known Problems Paternal Aunt     No Known Problems Paternal Uncle        Social History     Occupational History    Not on file   Tobacco Use    Smoking status: Never Smoker    Smokeless tobacco: Never Used    Tobacco comment: Former smoker, per allscripts   Vaping Use    Vaping Use: Never used   Substance and Sexual Activity    Alcohol use: No     Comment: social drinker, per allscripts    Drug use: No    Sexual activity: Not Currently     Partners: Male     Birth control/protection: Post-menopausal         Current Outpatient Medications:     gabapentin (NEURONTIN) 300 mg capsule, Take 1 capsule (300 mg total) by mouth 2 (two) times a day, Disp: 60 capsule, Rfl: 2    [START ON 9/7/2022] HYDROcodone-acetaminophen (NORCO) 7 5-325 mg per tablet, Take 1 tablet by mouth every 8 (eight) hours as needed for pain Do not fill until 9/7/2022 Max Daily Amount: 3 tablets, Disp: 90 tablet, Rfl: 0    [START ON 8/8/2022] HYDROcodone-acetaminophen (NORCO) 7 5-325 mg per tablet, Take 1 tablet by mouth every 8 (eight) hours as needed for pain Do not fill until 8/8/2022 Max Daily Amount: 3 tablets, Disp: 90 tablet, Rfl: 0    meloxicam (MOBIC) 15 mg tablet, Take 1 tablet (15 mg total) by mouth daily Take with food, Disp: 30 tablet, Rfl: 2    ondansetron (Zofran ODT) 4 mg disintegrating tablet, Take 1 tablet (4 mg total) by mouth every 6 (six) hours as needed for nausea or vomiting, Disp: 10 tablet, Rfl: 0    Allergies   Allergen Reactions    Peach [Prunus Persica] Other (See Comments)     Closure of throat with peaches, apricots,plums , nectarine ,and almonds       Physical Exam:    /75   Pulse 75   Temp 98 2 °F (36 8 °C)   Resp 19   Ht 5' 2" (1 575 m)   Wt 78 kg (172 lb)   BMI 31 46 kg/m²     Constitutional:normal, well developed, well nourished, alert, in no distress and non-toxic and no overt pain behavior   and obese  Eyes:anicteric  HEENT:grossly intact  Neck:supple, symmetric, trachea midline and no masses   Pulmonary:even and unlabored  Cardiovascular:No edema or pitting edema present  Skin:Normal without rashes or lesions and well hydrated  Psychiatric:Mood and affect appropriate  Neurologic:Cranial Nerves II-XII grossly intact  Musculoskeletal:antalgic

## 2022-09-29 ENCOUNTER — OFFICE VISIT (OUTPATIENT)
Dept: PAIN MEDICINE | Facility: CLINIC | Age: 52
End: 2022-09-29
Payer: COMMERCIAL

## 2022-09-29 VITALS
WEIGHT: 173.9 LBS | SYSTOLIC BLOOD PRESSURE: 112 MMHG | HEART RATE: 69 BPM | DIASTOLIC BLOOD PRESSURE: 76 MMHG | HEIGHT: 62 IN | BODY MASS INDEX: 32 KG/M2

## 2022-09-29 DIAGNOSIS — G89.4 CHRONIC PAIN SYNDROME: Primary | ICD-10-CM

## 2022-09-29 DIAGNOSIS — Z79.891 LONG-TERM CURRENT USE OF OPIATE ANALGESIC: ICD-10-CM

## 2022-09-29 DIAGNOSIS — F11.20 UNCOMPLICATED OPIOID DEPENDENCE (HCC): ICD-10-CM

## 2022-09-29 DIAGNOSIS — M54.16 LUMBAR RADICULOPATHY: ICD-10-CM

## 2022-09-29 DIAGNOSIS — M46.1 SACROILIITIS (HCC): ICD-10-CM

## 2022-09-29 DIAGNOSIS — Z96.642 S/P HIP REPLACEMENT, LEFT: ICD-10-CM

## 2022-09-29 PROCEDURE — 80305 DRUG TEST PRSMV DIR OPT OBS: CPT

## 2022-09-29 PROCEDURE — 99214 OFFICE O/P EST MOD 30 MIN: CPT

## 2022-09-29 RX ORDER — MELOXICAM 15 MG/1
15 TABLET ORAL DAILY
Qty: 30 TABLET | Refills: 2 | Status: SHIPPED | OUTPATIENT
Start: 2022-09-29

## 2022-09-29 RX ORDER — PREDNISONE 10 MG/1
TABLET ORAL
Qty: 17 TABLET | Refills: 0 | Status: SHIPPED | OUTPATIENT
Start: 2022-09-29 | End: 2022-10-07

## 2022-09-29 RX ORDER — HYDROCODONE BITARTRATE AND ACETAMINOPHEN 7.5; 325 MG/1; MG/1
1 TABLET ORAL EVERY 8 HOURS PRN
Qty: 90 TABLET | Refills: 0 | Status: SHIPPED | OUTPATIENT
Start: 2022-11-08 | End: 2022-12-08

## 2022-09-29 RX ORDER — HYDROCODONE BITARTRATE AND ACETAMINOPHEN 7.5; 325 MG/1; MG/1
1 TABLET ORAL EVERY 8 HOURS PRN
Qty: 90 TABLET | Refills: 0 | Status: SHIPPED | OUTPATIENT
Start: 2022-10-09 | End: 2022-11-08

## 2022-09-29 NOTE — PROGRESS NOTES
Pain Medicine Follow-Up Note    Assessment:  1  Chronic pain syndrome    2  S/P hip replacement, left    3  Uncomplicated opioid dependence ([address] )    4  Long-term current use of opiate analgesic    5  Sacroiliitis (HCC)    6  Lumbar radiculopathy        Plan:  Orders Placed This Encounter   Procedures    MRI lumbar spine without contrast     Standing Status:   Future     Standing Expiration Date:   9/29/2026     Scheduling Instructions: There is no preparation for this test  Please leave your jewelry and valuables at home, wedding rings are the exception  All patients will be required to change into a hospital gown and pants  Street clothes are not permitted in the MRI  Magnetic nail polish must be removed prior to arrival for your test  Please bring your insurance cards, a form of photo ID and a list of your medications with you  Arrive 15 minutes prior to your appointment time in order to register  Please bring any prior CT or MRI studies of this area that were not performed at a [facility]  To schedule this appointment, please contact Central Scheduling at [phone]  Prior to your appointment, please make sure you complete the MRI Screening Form when you e-Check in for your appointment  This will be available starting 7 days before your appointment in [address]  You may receive an e-mail with an activation code if you do not have a [employer] account  If you do not have access to a device, we will complete your screening at your appointment  Order Specific Question:   What is the patient's sedation requirement? Answer:   Unknown     Order Specific Question:   Is the patient pregnant? Answer:   No     Order Specific Question:   Release to patient through [employer] [facility]     Answer:   Immediate     Order Specific Question:   Is order priority selected as STAT?      Answer:   No     Order Specific Question:   Reason for Exam (FREE TEXT)     Answer:   Unresolved low back pain with radiculopathy     Order Specific Question:   When should the test be performed? Answer:   Elective- non urgent    MM ALL_Prescribed Meds and Special Instructions     Order Specific Question:   [facility] Is GABAPENTIN prescribed? Answer:   Yes     Order Specific Question:   [facility] Is HYDROCODONE/APAP prescribed? Answer:   Yes     Order Specific Question:   [facility] Is MELOXICAM prescribed? Answer:    Yes    MM DT_Alprazolam Definitive Test    MM DT_Amphetamine Definitive Test    MM DT_Aripiprazole Definitive Test    MM DT_Bath Salts Definitive Test    MM DT_Buprenorphine Definitive Test    MM DT_Butalbital Definitive Test    MM DT_Clozapine Definitive Test    MM DT_Clonazepam Definitive Test    MM DT_Cocaine Definitive Test    MM DT_Codeine Definitive Test    MM DT_Desipramine Definitive Test    MM DT_Dextromethorphan Definitive Test    MM Diazepam Definitive Test    MM DT_Ethyl Glucuronide/Ethyl Sulfate Definitive Test    MM DT_Fentanyl Definitive Test    MM DT_Haloperidol Definitive Test    MM DT_Heroin Definitive Test    MM DT_Hydrocodone Definitive Test    MM DT_Hydromorphone Definitive Test    MM DT_Imipramine Definitive Test    MM DT_Kratom Definitive Test    MM DT_Levorphanol Definitive Test    MM Lorazepam Definitive Test    MM DT_MDMA Definitive Test    MM DT_Meperidine Definitive Test    MM DT_Methadone Definitive Test    MM DT_Methamphetamine Definitive Test    MM DT_Methylphenidate Definitive Test    MM DT_Morphine Definitive Test    MM DT_Oxazepam Definitive Test    MM DT_Oxycodone Definitive Test    MM DT_Oxymorphone Definitive Test    MM DT_Phenobarbital Definitive Test    MM DT_Phencyclidine Definitive Test    MM DT_Phentermine Definitive Test    MM DT_Secobarbital Definitive Test    MM DT_Spice Definitive Test    MM DT_Tapentadol Definitive Test    MM DT_Temazapam Definitive Test    MM DT_THC Definitive Test    MM DT_Tramadol Definitive Test       New Medications Ordered This Visit   Medications    predniSONE 10 mg tablet     Sig: Take 4 tablets (40 mg total) by mouth daily for 2 days, THEN 2 tablets (20 mg total) daily for 3 days, THEN 1 tablet (10 mg total) daily for 3 days  Dispense:  17 tablet     Refill:  0    HYDROcodone-acetaminophen (NORCO) 7 5-325 mg per tablet     Sig: Take 1 tablet by mouth every 8 (eight) hours as needed for pain Do not fill until 11/8/2022 Max Daily Amount: 3 tablets Do not start before November 8, 2022  Dispense:  90 tablet     Refill:  0    HYDROcodone-acetaminophen (NORCO) 7 5-325 mg per tablet     Sig: Take 1 tablet by mouth every 8 (eight) hours as needed for pain Do not fill until 10/9/2022 Max Daily Amount: 3 tablets Do not start before October 9, 2022  Dispense:  90 tablet     Refill:  0    meloxicam (MOBIC) 15 mg tablet     Sig: Take 1 tablet (15 mg total) by mouth daily Take with food     Dispense:  30 tablet     Refill:  2       My impressions and treatment recommendations were discussed in detail with the patient who verbalized understanding and had no further questions  Patient presents to the office in significant worse pain, patient is status post left total hip replacement which has been bothersome to the patient since it was implanted  Patient also reports severe back pain that radiates down her posterior back buttock into her groin  Patient states she is fallen multiple times over the summer most recently 3 weeks ago, patient states she feels that she falls because her legs do not listen to her  Patient's last MRI was done in 2018 at this time I find it appropriate for the patient to undergo a a new MRI of the lumbar spine  Also discussed with the patient the option to do a lateral branch block of S1, S2, S3    At this time the patient has intense pain therefore I will trial her on a prednisone taper 1st   Patient also does report a reduced level of pain and improvement level of functioning without significant side effects using hydrocodone/acetaminophen 7 5/325 mg 1 tablet every 8 hours as needed for severe pain, therefore I will continue the patient on this medication  Prescription E prescribed to the patient's pharmacy with fill dates of 10/09/2022 and 11/08/2022  Patient is in agreement with this plan  [place] Prescription Drug Monitoring Program report was reviewed and was appropriate     A urine drug screen was collected at today's office visit as part of our medication management protocol  The point of care testing results were appropriate for what was being prescribed  The specimen will be sent for confirmatory testing  The drug screen is medically necessary because the patient is either dependent on opioid medication or is being considered for opioid medication therapy and the results could impact ongoing or future treatment  The drug screen is to evaluate for the presences or absence of prescribed, non-prescribed, and/or illicit drugs/substances  There are risks associated with opioid medications, including dependence, addiction and tolerance  The patient understands and agrees to use these medications only as prescribed  Potential side effects of the medications include, but are not limited to, constipation, drowsiness, addiction, impaired judgment and risk of fatal overdose if not taken as prescribed  The patient was warned against driving while taking sedation medications  Sharing medications is a felony  At this point in time, the patient is showing no signs of addiction, abuse, diversion or suicidal ideation  Complete risks and benefits including bleeding, infection, tissue reaction, nerve injury and allergic reaction were discussed  The approach was demonstrated using models and literature was provided  Verbal and written consent was obtained  Follow-up is planned in 8 weeks time or sooner as warranted    Discharge instructions were provided  I personally saw and examined the patient and I agree with the above discussed plan of care  History of Present Illness:    Richardson Hall is a 49 y o  female who presents to [facility] for interval re-evaluation of the above stated pain complaints  The patient has a past medical and chronic pain history as outlined in the assessment section  She was last seen on 7/18/2022  At today's visit patient states that her pain worse with a pain score of 9/10 on the verbal numeric pain scale patient's pain is worse all the time  The patient's pain is constant in nature  The quality of the patient's pain is described as sharp, shooting, and tightness  Patient indicates that her pain is in her bilateral low back, left posterior thigh, anterior left groin and left knee  Patient states that she is not obtaining enough pain relief from her current treatment plan  Pain Contract Signed:  07/18/2022  Last Urine Drug Screen:  05/11/2022  New Drug Screen Collected: 09/29/2022    Other than as stated above, the patient denies any interval changes in medications, medical condition, mental condition, symptoms, or allergies since the last office visit           Review of Systems:    Review of Systems      Patient Active Problem List   Diagnosis    Anxiety    Poison ivy    S/P hip replacement, left    Chronic pain syndrome    Pain in left hip    Pain in both feet    Anxiety and depression    Heel pain, bilateral    Foot arch pain    Encounter for screening mammogram for breast cancer    Fatigue    Abnormal glucose    BMI 31 0-31 9,adult    Herpes zoster without complication    [id number] virus infection       Past Medical History:   Diagnosis Date    Chronic hip pain, left        Past Surgical History:   Procedure Laterality Date    ANKLE ARTHROPLASTY      BREAST LUMPECTOMY      BREAST SURGERY Bilateral     Enlargement    EPIDURAL BLOCK INJECTION Left 3/5/2021    Procedure: left L5 transforaminal epidural steroid injection ( 16755); Surgeon: Valdo Montalvo MD;  Location: [facility] MAIN OR;  Service: Pain Management     FL INJECTION LEFT ELBOW (NON ARTHROGRAM)  4/7/2022    LAPAROSCOPY FOR ECTOPIC PREGNANCY      And Salpingectomy    ORTHOPEDIC SURGERY  9/2018    [place] INJECTION,SACROILIAC JOINT Left 4/7/2022    Procedure: SACROILIAC joint injection (67503 ); Surgeon: Valdo Montalvo MD;  Location: [facility] MAIN OR;  Service: Pain Management     STEROID INJECTION HIP Left 4/16/2021    Procedure: Sacroiliac Joint Injection ([id number]);   Surgeon: Valdo Montalvo MD;  Location: [facility] MAIN OR;  Service: Pain Management     TOTAL HIP ARTHROPLASTY Left 09/2018       Family History   Problem Relation Age of Onset    Breast cancer Mother     Arthritis Mother     Emphysema Mother     COPD Mother     Alcohol abuse Father         He has been clean for 2 years    No Known Problems Sister     No Known Problems Brother     No Known Problems Maternal Grandmother     No Known Problems Maternal Grandfather     No Known Problems Paternal Grandmother     No Known Problems Paternal Grandfather     Breast cancer Maternal Aunt     Cancer Maternal Aunt     No Known Problems Maternal Uncle     No Known Problems Paternal Aunt     No Known Problems Paternal Uncle        Social History     Occupational History    Not on file   Tobacco Use    Smoking status: Never Smoker    Smokeless tobacco: Never Used    Tobacco comment: Former smoker, per allscripts   Vaping Use    Vaping Use: Never used   Substance and Sexual Activity    Alcohol use: No     Comment: social drinker, per allscripts    Drug use: No    Sexual activity: Not Currently     Partners: Male     Birth control/protection: Post-menopausal         Current Outpatient Medications:     gabapentin (NEURONTIN) 300 mg capsule, Take 1 capsule (300 mg total) by mouth 2 (two) times a day, Disp: 60 capsule, Rfl: 2    [START ON 11/8/2022] HYDROcodone-acetaminophen (NORCO) 7 5-325 mg per tablet, Take 1 tablet by mouth every 8 (eight) hours as needed for pain Do not fill until 11/8/2022 Max Daily Amount: 3 tablets Do not start before November 8, 2022 , Disp: 90 tablet, Rfl: 0    [START ON 10/9/2022] HYDROcodone-acetaminophen (Kael Zana) 7 5-325 mg per tablet, Take 1 tablet by mouth every 8 (eight) hours as needed for pain Do not fill until 10/9/2022 Max Daily Amount: 3 tablets Do not start before October 9, 2022 , Disp: 90 tablet, Rfl: 0    meloxicam (MOBIC) 15 mg tablet, Take 1 tablet (15 mg total) by mouth daily Take with food, Disp: 30 tablet, Rfl: 2    ondansetron (Zofran ODT) 4 mg disintegrating tablet, Take 1 tablet (4 mg total) by mouth every 6 (six) hours as needed for nausea or vomiting, Disp: 10 tablet, Rfl: 0    predniSONE 10 mg tablet, Take 4 tablets (40 mg total) by mouth daily for 2 days, THEN 2 tablets (20 mg total) daily for 3 days, THEN 1 tablet (10 mg total) daily for 3 days  , Disp: 17 tablet, Rfl: 0    Allergies   Allergen Reactions    Peach [Prunus Persica] Other (See Comments)     Closure of throat with peaches, apricots,plums , nectarine ,and almonds       Physical Exam:    /76   Pulse 69   Ht 5' 2" (1 575 m)   Wt 78 9 kg (173 lb 14 4 oz)   BMI 31 81 kg/m²     Constitutional:normal, well developed, well nourished, alert, in no distress and non-toxic and no overt pain behavior   and overweight  Eyes:anicteric  HEENT:grossly intact  Neck:supple, symmetric, trachea midline and no masses   Pulmonary:even and unlabored  Cardiovascular:No edema or pitting edema present  Skin:Normal without rashes or lesions and well hydrated  Psychiatric:Mood and affect appropriate  Neurologic:Cranial Nerves II-XII grossly intact  Musculoskeletal:antalgic     Lumbar Spine Exam    Appearance:  Normal lordosis  Palpation/Tenderness:  left lumbar paraspinal tenderness  left sacroiliac joint tenderness  Special Tests:  Left Straight Leg Test: positive  Right Straight Leg Test:  negative  Left Jake's Maneuver:  positive  Right Jake's Maneuver:  negative           Imaging  MRI lumbar spine without contrast    (Results Pending)         Orders Placed This Encounter   Procedures    MRI lumbar spine without contrast    MM ALL_Prescribed Meds and Special Instructions    MM DT_Alprazolam Definitive Test    MM DT_Amphetamine Definitive Test    MM DT_Aripiprazole Definitive Test    MM DT_Bath Salts Definitive Test    MM DT_Buprenorphine Definitive Test    MM DT_Butalbital Definitive Test    MM DT_Clozapine Definitive Test    MM DT_Clonazepam Definitive Test    MM DT_Cocaine Definitive Test    MM DT_Codeine Definitive Test    MM DT_Desipramine Definitive Test    MM DT_Dextromethorphan Definitive Test    MM Diazepam Definitive Test    MM DT_Ethyl Glucuronide/Ethyl Sulfate Definitive Test    MM DT_Fentanyl Definitive Test    MM DT_Haloperidol Definitive Test    MM DT_Heroin Definitive Test    MM DT_Hydrocodone Definitive Test    MM DT_Hydromorphone Definitive Test    MM DT_Imipramine Definitive Test    MM DT_Kratom Definitive Test    MM DT_Levorphanol Definitive Test    MM Lorazepam Definitive Test    MM DT_MDMA Definitive Test    MM DT_Meperidine Definitive Test    MM DT_Methadone Definitive Test    MM DT_Methamphetamine Definitive Test    MM DT_Methylphenidate Definitive Test    MM DT_Morphine Definitive Test    MM DT_Oxazepam Definitive Test    MM DT_Oxycodone Definitive Test    MM DT_Oxymorphone Definitive Test    MM DT_Phenobarbital Definitive Test    MM DT_Phencyclidine Definitive Test    MM DT_Phentermine Definitive Test    MM DT_Secobarbital Definitive Test    MM DT_Spice Definitive Test    MM DT_Tapentadol Definitive Test    MM DT_Temazapam Definitive Test    MM DT_THC Definitive Test    MM DT_Tramadol Definitive Test

## 2022-09-29 NOTE — PATIENT INSTRUCTIONS

## 2022-10-01 LAB
6MAM UR QL CFM: NEGATIVE NG/ML
7AMINOCLONAZEPAM UR QL CFM: NEGATIVE NG/ML
A-OH ALPRAZ UR QL CFM: NEGATIVE NG/ML
AMPHET UR QL CFM: NEGATIVE NG/ML
AMPHET UR QL CFM: NEGATIVE NG/ML
BUPRENORPHINE UR QL CFM: NEGATIVE NG/ML
BUTALBITAL UR QL CFM: NEGATIVE NG/ML
BZE UR QL CFM: NEGATIVE NG/ML
CODEINE UR QL CFM: NEGATIVE NG/ML
DESIPRAMINE UR QL CFM: NEGATIVE NG/ML
DESIPRAMINE UR QL CFM: NEGATIVE NG/ML
EDDP UR QL CFM: NEGATIVE NG/ML
ETHYL GLUCURONIDE UR QL CFM: NEGATIVE NG/ML
ETHYL SULFATE UR QL SCN: NEGATIVE NG/ML
EUTYLONE UR QL: NEGATIVE NG/ML
FENTANYL UR QL CFM: NEGATIVE NG/ML
GLIADIN IGG SER IA-ACNC: NEGATIVE NG/ML
GLUCOSE 30M P 50 G LAC PO SERPL-MCNC: NEGATIVE NG/ML
HYDROCODONE UR QL CFM: NORMAL NG/ML
HYDROCODONE UR QL CFM: NORMAL NG/ML
HYDROMORPHONE UR QL CFM: NORMAL NG/ML
IMIPRAMINE UR QL CFM: NEGATIVE NG/ML
LORAZEPAM UR QL CFM: NEGATIVE NG/ML
MDMA UR QL CFM: NEGATIVE NG/ML
ME-PHENIDATE UR QL CFM: NEGATIVE NG/ML
MEPERIDINE UR QL CFM: NEGATIVE NG/ML
METHADONE UR QL CFM: NEGATIVE NG/ML
METHAMPHET UR QL CFM: NEGATIVE NG/ML
MORPHINE UR QL CFM: NEGATIVE NG/ML
MORPHINE UR QL CFM: NEGATIVE NG/ML
NORBUPRENORPHINE UR QL CFM: NEGATIVE NG/ML
NORDIAZEPAM UR QL CFM: NEGATIVE NG/ML
NORFENTANYL UR QL CFM: NEGATIVE NG/ML
NORHYDROCODONE UR QL CFM: NORMAL NG/ML
NORHYDROCODONE UR QL CFM: NORMAL NG/ML
NORMEPERIDINE UR QL CFM: NEGATIVE NG/ML
NOROXYCODONE UR QL CFM: NEGATIVE NG/ML
OPC-3373 QUANTIFICATION: NEGATIVE
OXAZEPAM UR QL CFM: NEGATIVE NG/ML
OXYCODONE UR QL CFM: NEGATIVE NG/ML
OXYMORPHONE UR QL CFM: NEGATIVE NG/ML
OXYMORPHONE UR QL CFM: NEGATIVE NG/ML
PARA-FLUOROFENTANYL QUANTIFICATION: NORMAL NG/ML
PCP UR QL CFM: NEGATIVE NG/ML
PHENOBARB UR QL CFM: NEGATIVE NG/ML
RESULT ALL_PRESCRIBED MEDS AND SPECIAL INSTRUCTIONS: NORMAL
SECOBARBITAL UR QL CFM: NEGATIVE NG/ML
SL AMB 4-ANPP QUANTIFICATION: NORMAL NG/ML
SL AMB 5F-ADB-M7 METABOLITE QUANTIFICATION: NEGATIVE NG/ML
SL AMB 7-OH-MITRAGYNINE (KRATOM ALKALOID) QUANTIFICATION: NEGATIVE NG/ML
SL AMB AB-FUBINACA-M3 METABOLITE QUANTIFICATION: NEGATIVE NG/ML
SL AMB ACETYL FENTANYL QUANTIFICATION: NORMAL NG/ML
SL AMB ACETYL NORFENTANYL QUANTIFICATION: NORMAL NG/ML
SL AMB ACRYL FENTANYL QUANTIFICATION: NORMAL NG/ML
SL AMB CARFENTANIL QUANTIFICATION: NORMAL NG/ML
SL AMB CLOZAPINE QUANTIFICATION: NEGATIVE NG/ML
SL AMB CTHC (MARIJUANA METABOLITE) QUANTIFICATION: NEGATIVE NG/ML
SL AMB DEXTROMETHORPHAN QUANTIFICATION: NEGATIVE NG/ML
SL AMB DEXTRORPHAN (DEXTROMETHORPHAN METABOLITE) QUANT: NEGATIVE NG/ML
SL AMB DEXTRORPHAN (DEXTROMETHORPHAN METABOLITE) QUANT: NEGATIVE NG/ML
SL AMB HALOPERIDOL  QUANTIFICATION: NEGATIVE NG/ML
SL AMB HALOPERIDOL METABOLITE QUANTIFICATION: NEGATIVE NG/ML
SL AMB JWH018 METABOLITE QUANTIFICATION: NEGATIVE NG/ML
SL AMB JWH073 METABOLITE QUANTIFICATION: NEGATIVE NG/ML
SL AMB MDMB-FUBINACA-M1 METABOLITE QUANTIFICATION: NEGATIVE NG/ML
SL AMB METHYLONE QUANTIFICATION: NORMAL NG/ML
SL AMB N-DESMETHYL-TRAMADOL QUANTIFICATION: NEGATIVE NG/ML
SL AMB N-DESMETHYLCLOZAPINE QUANTIFICATION: NEGATIVE NG/ML
SL AMB PHENTERMINE QUANTIFICATION: NEGATIVE NG/ML
SL AMB RCS4 METABOLITE QUANTIFICATION: NEGATIVE NG/ML
SL AMB RITALINIC ACID QUANTIFICATION: NEGATIVE NG/ML
SPECIMEN DRAWN SERPL: NEGATIVE NG/ML
TAPENTADOL UR QL CFM: NEGATIVE NG/ML
TEMAZEPAM UR QL CFM: NEGATIVE NG/ML
TEMAZEPAM UR QL CFM: NEGATIVE NG/ML
TRAMADOL UR QL CFM: NEGATIVE NG/ML
URATE/CREAT 24H UR: NEGATIVE NG/ML

## 2022-10-04 ENCOUNTER — TELEPHONE (OUTPATIENT)
Dept: PAIN MEDICINE | Facility: CLINIC | Age: 52
End: 2022-10-04

## 2022-10-04 NOTE — TELEPHONE ENCOUNTER
Spoke with Susana Knows to schedule the Sacroiliac block   She is still thinking about it and will call back

## 2022-12-01 ENCOUNTER — OFFICE VISIT (OUTPATIENT)
Dept: PAIN MEDICINE | Facility: CLINIC | Age: 52
End: 2022-12-01

## 2022-12-01 VITALS
DIASTOLIC BLOOD PRESSURE: 91 MMHG | HEART RATE: 78 BPM | HEIGHT: 62 IN | SYSTOLIC BLOOD PRESSURE: 128 MMHG | WEIGHT: 173 LBS | BODY MASS INDEX: 31.83 KG/M2 | TEMPERATURE: 99.3 F

## 2022-12-01 DIAGNOSIS — M25.552 PAIN IN LEFT HIP: ICD-10-CM

## 2022-12-01 DIAGNOSIS — Z96.642 S/P HIP REPLACEMENT, LEFT: ICD-10-CM

## 2022-12-01 DIAGNOSIS — G89.4 CHRONIC PAIN SYNDROME: Primary | ICD-10-CM

## 2022-12-01 RX ORDER — MELOXICAM 15 MG/1
15 TABLET ORAL DAILY
Qty: 30 TABLET | Refills: 2 | Status: SHIPPED | OUTPATIENT
Start: 2022-12-01

## 2022-12-01 RX ORDER — HYDROCODONE BITARTRATE AND ACETAMINOPHEN 7.5; 325 MG/1; MG/1
1 TABLET ORAL EVERY 8 HOURS PRN
Qty: 90 TABLET | Refills: 0 | Status: SHIPPED | OUTPATIENT
Start: 2023-02-08 | End: 2023-03-10

## 2022-12-01 RX ORDER — HYDROCODONE BITARTRATE AND ACETAMINOPHEN 7.5; 325 MG/1; MG/1
1 TABLET ORAL EVERY 8 HOURS PRN
Qty: 90 TABLET | Refills: 0 | Status: SHIPPED | OUTPATIENT
Start: 2023-01-09 | End: 2023-02-08

## 2022-12-01 RX ORDER — HYDROCODONE BITARTRATE AND ACETAMINOPHEN 7.5; 325 MG/1; MG/1
1 TABLET ORAL EVERY 8 HOURS PRN
Qty: 90 TABLET | Refills: 0 | Status: SHIPPED | OUTPATIENT
Start: 2022-12-10 | End: 2023-01-09

## 2022-12-01 NOTE — PROGRESS NOTES
Pain Medicine Follow-Up Note    Assessment:  1  Chronic pain syndrome    2  Pain in left hip    3  S/P hip replacement, left        Plan:  New Medications Ordered This Visit   Medications   • HYDROcodone-acetaminophen (NORCO) 7 5-325 mg per tablet     Sig: Take 1 tablet by mouth every 8 (eight) hours as needed for pain Do not fill until 11/8/2022 Max Daily Amount: 3 tablets Do not start before December 10, 2022  Dispense:  90 tablet     Refill:  0   • HYDROcodone-acetaminophen (NORCO) 7 5-325 mg per tablet     Sig: Take 1 tablet by mouth every 8 (eight) hours as needed for pain Do not fill until 11/8/2022 Max Daily Amount: 3 tablets Do not start before January 9, 2023  Dispense:  90 tablet     Refill:  0   • HYDROcodone-acetaminophen (NORCO) 7 5-325 mg per tablet     Sig: Take 1 tablet by mouth every 8 (eight) hours as needed for pain Do not fill until 11/8/2022 Max Daily Amount: 3 tablets Do not start before February 8, 2023  Dispense:  90 tablet     Refill:  0   • meloxicam (MOBIC) 15 mg tablet     Sig: Take 1 tablet (15 mg total) by mouth daily Take with food     Dispense:  30 tablet     Refill:  2     My impressions and treatment recommendations were discussed in detail with the patient who verbalized understanding and had no further questions  The the patient is currently reporting overall reduced pain and improved level of functioning without significant side effects on hydrocodone/acetaminophen 7 5/325 mg 1 tablet every 8 hours as needed for pain as well as meloxicam 15 mg 1 tablet once daily as needed for pain, with food  I did feel reasonable to continue the patient on this medication  I sent E prescriptions to the pharmacy dated December 10, 2022, January 9, 2023, and February 8, 2023  The risks and side effects of chronic opioid treatment were discussed in detail with the patient   Side effects include but are not limited to nausea, vomiting, GI intolerance, sedation, constipation, mental clouding, opioid-induced hyperalgesia, endocrine dysfunction, addiction, dependence, and tolerance  The patient was asked to take his medications only as prescribed and directed, never in excess, and never for any other reason other than for pain control  The patient was also asked to keep his medications out of the reach of others and away from children, preferably in a locked drawer  The patient verbalized understanding and wished to use these opioid medications  [place] Prescription Drug Monitoring Program report was reviewed and was appropriate     Follow-up is planned in 3 months time or sooner as warranted  Discharge instructions were provided  I personally saw and examined the patient and I agree with the above discussed plan of care  History of Present Illness:    Purvi Neal is a 47 y o  female who presents to [facility] for interval re-evaluation of the above stated pain complaints  The patient has a past medical and chronic pain history as outlined in the assessment section  She was last seen on September 29, 2022  At today's office visit, the patient's pain score is 7/10 on the verbal numerical pain rating scale  The patient states that her pain is primarily in the left buttocks, left hip, and left knee region  She describes her pain as worse at night  Her pain is constant in nature  She reports the quality of her pain as burning, sharp, and shooting  She is reporting about 50% relief of symptoms with the combination of her medications  She denies any opioid induced constipation  She uses 3 tablets of the hydrocodone/acetaminophen per day, which is appropriate  Other than as stated above, the patient denies any interval changes in medications, medical condition, mental condition, symptoms, or allergies since the last office visit  Review of Systems:    Review of Systems   Respiratory: Negative for shortness of breath      Cardiovascular: Negative for chest pain  Gastrointestinal: Negative for constipation, diarrhea, nausea and vomiting  Musculoskeletal: Negative for arthralgias, gait problem, joint swelling and myalgias  Skin: Negative for rash  Neurological: Negative for dizziness, seizures and weakness  All other systems reviewed and are negative  Patient Active Problem List   Diagnosis   • Anxiety   • Poison ivy   • S/P hip replacement, left   • Chronic pain syndrome   • Pain in left hip   • Pain in both feet   • Anxiety and depression   • Heel pain, bilateral   • Foot arch pain   • Encounter for screening mammogram for breast cancer   • Fatigue   • Abnormal glucose   • BMI 31 0-31 9,adult   • Herpes zoster without complication   • [id number] virus infection       Past Medical History:   Diagnosis Date   • Chronic hip pain, left        Past Surgical History:   Procedure Laterality Date   • ANKLE ARTHROPLASTY     • BREAST LUMPECTOMY     • BREAST SURGERY Bilateral     Enlargement   • EPIDURAL BLOCK INJECTION Left 3/5/2021    Procedure: left L5 transforaminal epidural steroid injection ( 98038); Surgeon: David Mathews MD;  Location: [facility] OR;  Service: Pain Management    • FL INJECTION LEFT ELBOW (NON ARTHROGRAM)  4/7/2022   • LAPAROSCOPY FOR ECTOPIC PREGNANCY      And Salpingectomy   • ORTHOPEDIC SURGERY  9/2018   • IN INJECTION,SACROILIAC JOINT Left 4/7/2022    Procedure: SACROILIAC joint injection (86252 ); Surgeon: David Mathews MD;  Location: [facility] MAIN OR;  Service: Pain Management    • STEROID INJECTION HIP Left 4/16/2021    Procedure: Sacroiliac Joint Injection (15094);   Surgeon: David Mathews MD;  Location: [facility] OR;  Service: Pain Management    • TOTAL HIP ARTHROPLASTY Left 09/2018       Family History   Problem Relation Age of Onset   • Breast cancer Mother    • Arthritis Mother    • Emphysema Mother    • COPD Mother    • Alcohol abuse Father         He has been clean for 2 years   • No Known Problems Sister    • No Known Problems Brother    • No Known Problems Maternal Grandmother    • No Known Problems Maternal Grandfather    • No Known Problems Paternal Grandmother    • No Known Problems Paternal Grandfather    • Breast cancer Maternal Aunt    • Cancer Maternal Aunt    • No Known Problems Maternal Uncle    • No Known Problems Paternal Aunt    • No Known Problems Paternal Uncle        Social History     Occupational History   • Not on file   Tobacco Use   • Smoking status: Never   • Smokeless tobacco: Never   • Tobacco comments:     Former smoker, per allscripts   Vaping Use   • Vaping Use: Never used   Substance and Sexual Activity   • Alcohol use: No     Comment: social drinker, per allscripts   • Drug use: No   • Sexual activity: Not Currently     Partners: Male     Birth control/protection: Post-menopausal         Current Outpatient Medications:   •  gabapentin (NEURONTIN) 300 mg capsule, Take 1 capsule (300 mg total) by mouth 2 (two) times a day, Disp: 60 capsule, Rfl: 2  •  [START ON 12/10/2022] HYDROcodone-acetaminophen (NORCO) 7 5-325 mg per tablet, Take 1 tablet by mouth every 8 (eight) hours as needed for pain Do not fill until 11/8/2022 Max Daily Amount: 3 tablets Do not start before December 10, 2022 , Disp: 90 tablet, Rfl: 0  •  [START ON 1/9/2023] HYDROcodone-acetaminophen (NORCO) 7 5-325 mg per tablet, Take 1 tablet by mouth every 8 (eight) hours as needed for pain Do not fill until 11/8/2022 Max Daily Amount: 3 tablets Do not start before January 9, 2023 , Disp: 90 tablet, Rfl: 0  •  [START ON 2/8/2023] HYDROcodone-acetaminophen (NORCO) 7 5-325 mg per tablet, Take 1 tablet by mouth every 8 (eight) hours as needed for pain Do not fill until 11/8/2022 Max Daily Amount: 3 tablets Do not start before February 8, 2023 , Disp: 90 tablet, Rfl: 0  •  meloxicam (MOBIC) 15 mg tablet, Take 1 tablet (15 mg total) by mouth daily Take with food, Disp: 30 tablet, Rfl: 2  •  ondansetron (Zofran ODT) 4 mg disintegrating tablet, Take 1 tablet (4 mg total) by mouth every 6 (six) hours as needed for nausea or vomiting (Patient not taking: Reported on 12/1/2022), Disp: 10 tablet, Rfl: 0    Allergies   Allergen Reactions   • Peach [Prunus Persica] Other (See Comments)     Closure of throat with peaches, apricots,plums , nectarine ,and almonds       Physical Exam:    /91   Pulse 78   Temp 99 3 °F (37 4 °C)   Ht 5' 2" (1 575 m)   Wt 78 5 kg (173 lb)   BMI 31 64 kg/m²     Constitutional:obese  Eyes:anicteric  HEENT:grossly intact  Neck:supple, symmetric, trachea midline and no masses   Pulmonary:even and unlabored  Cardiovascular:No edema or pitting edema present  Skin:Normal without rashes or lesions and well hydrated  Psychiatric:Mood and affect appropriate  Neurologic:Cranial Nerves II-XII grossly intact  Musculoskeletal:normal

## 2022-12-30 ENCOUNTER — TELEPHONE (OUTPATIENT)
Dept: OBGYN CLINIC | Facility: CLINIC | Age: 52
End: 2022-12-30

## 2023-01-03 DIAGNOSIS — M46.1 SACROILIITIS (HCC): ICD-10-CM

## 2023-01-04 RX ORDER — GABAPENTIN 300 MG/1
300 CAPSULE ORAL 2 TIMES DAILY
Qty: 60 CAPSULE | Refills: 0 | Status: SHIPPED | OUTPATIENT
Start: 2023-01-04

## 2023-02-03 DIAGNOSIS — M46.1 SACROILIITIS (HCC): ICD-10-CM

## 2023-02-04 RX ORDER — GABAPENTIN 300 MG/1
CAPSULE ORAL
Qty: 60 CAPSULE | Refills: 0 | Status: SHIPPED | OUTPATIENT
Start: 2023-02-04

## 2023-03-01 ENCOUNTER — OFFICE VISIT (OUTPATIENT)
Dept: PAIN MEDICINE | Facility: CLINIC | Age: 53
End: 2023-03-01

## 2023-03-01 VITALS
BODY MASS INDEX: 32.74 KG/M2 | TEMPERATURE: 98.8 F | WEIGHT: 179 LBS | SYSTOLIC BLOOD PRESSURE: 128 MMHG | DIASTOLIC BLOOD PRESSURE: 78 MMHG | HEART RATE: 68 BPM

## 2023-03-01 DIAGNOSIS — G89.4 CHRONIC PAIN SYNDROME: Primary | ICD-10-CM

## 2023-03-01 DIAGNOSIS — Z96.642 S/P HIP REPLACEMENT, LEFT: ICD-10-CM

## 2023-03-01 DIAGNOSIS — F11.20 UNCOMPLICATED OPIOID DEPENDENCE (HCC): ICD-10-CM

## 2023-03-01 DIAGNOSIS — M46.1 SACROILIITIS (HCC): ICD-10-CM

## 2023-03-01 DIAGNOSIS — Z79.891 LONG-TERM CURRENT USE OF OPIATE ANALGESIC: ICD-10-CM

## 2023-03-01 DIAGNOSIS — M54.16 LUMBAR RADICULOPATHY: ICD-10-CM

## 2023-03-01 DIAGNOSIS — M25.552 PAIN IN LEFT HIP: ICD-10-CM

## 2023-03-01 RX ORDER — MELOXICAM 15 MG/1
15 TABLET ORAL DAILY
Qty: 30 TABLET | Refills: 2 | Status: SHIPPED | OUTPATIENT
Start: 2023-03-01

## 2023-03-01 RX ORDER — HYDROCODONE BITARTRATE AND ACETAMINOPHEN 7.5; 325 MG/1; MG/1
1 TABLET ORAL EVERY 8 HOURS PRN
Qty: 90 TABLET | Refills: 0 | Status: SHIPPED | OUTPATIENT
Start: 2023-04-10 | End: 2023-05-10

## 2023-03-01 RX ORDER — GABAPENTIN 300 MG/1
300 CAPSULE ORAL 3 TIMES DAILY
Qty: 90 CAPSULE | Refills: 1 | Status: SHIPPED | OUTPATIENT
Start: 2023-03-01

## 2023-03-01 RX ORDER — HYDROCODONE BITARTRATE AND ACETAMINOPHEN 7.5; 325 MG/1; MG/1
1 TABLET ORAL EVERY 8 HOURS PRN
Qty: 90 TABLET | Refills: 0 | Status: SHIPPED | OUTPATIENT
Start: 2023-03-11 | End: 2023-03-10 | Stop reason: SDUPTHER

## 2023-03-01 NOTE — PROGRESS NOTES
Pain Medicine Follow-Up Note    Assessment:  1  Chronic pain syndrome    2  S/P hip replacement, left    3  Pain in left hip    4  Long-term current use of opiate analgesic    5  Uncomplicated opioid dependence (HCC)    6  Lumbar radiculopathy    7  Sacroiliitis ([address] )        Plan:  Orders Placed This Encounter   Procedures   • MM OF_Alprazolam Definitive   • MM OF_Amphetamine Definitive Test   • MM OF_Buprenorphine Definitive Test   • MM OF_Carisoprodol Definitive Test   • MM OF_Clonazepam Definitive   • MM OF_Cocaine Definitive Test   • MM OF_Codeine Definitive   • MM OF_Dextromethorphan Definitive Test   • MM OF_Diazepam Definitive   • MM OF_Fentanyl Definitive Test   • MM OF_Gabapentin Definitive Test   • MM OF_Heroin Definitive Test   • MM OF_Hydrocodone Definitive   • MM OF_Hydromorphone Definitive   • MM OF_Levorphanol Definitive Test   • MM OF_Lorazepam Definitive   • MM OF_MDMA Definitive Test   • MM OF_Meperidine Definitive Test   • MM OF_Methadone Definitive Test   • MM OF_Methylphenidate Definitive Test   • MM OF_Morphine Definitive   • MM OF_Naltrexone Definitive Test   • MM OF_Oxazepam Definitive   • MM OF_Oxycodone Definitive Test - Oral Fluid   • MM OF_Oxymorphone Definitive Test   • MM OF_Phencyclidine Definitive Test   • MM OF_Pregabalin Definitive Test   • MM OF_Tapentadol Definitive Test   • MM OF_Temazepam Definitive   • MM OF_THC Definitive Test   • MM OF_Tramadol Definitive Test   • XR hip/pelv 2-3 vws left if performed     Leif [place] on left hip     Standing Status:   Future     Standing Expiration Date:   3/1/2027     Scheduling Instructions:      Bring along any outside films relating to this procedure  Order Specific Question:   Is the patient pregnant? Answer:   No   • X-ray lumbar spine 2 or 3 views     Standing Status:   Future     Standing Expiration Date:   3/1/2027     Scheduling Instructions:      Bring along any outside films relating to this procedure             Order Specific Question:   Is the patient pregnant? Answer:   No       New Medications Ordered This Visit   Medications   • meloxicam (MOBIC) 15 mg tablet     Sig: Take 1 tablet (15 mg total) by mouth daily Take with food     Dispense:  30 tablet     Refill:  2   • HYDROcodone-acetaminophen (NORCO) 7 5-325 mg per tablet     Sig: Take 1 tablet by mouth every 8 (eight) hours as needed for pain Max Daily Amount: 3 tablets Do not start before April 10, 2023  Dispense:  90 tablet     Refill:  0     DNF until 4/10/2023   • HYDROcodone-acetaminophen (NORCO) 7 5-325 mg per tablet     Sig: Take 1 tablet by mouth every 8 (eight) hours as needed for pain Max Daily Amount: 3 tablets Do not start before March 11, 2023  Dispense:  90 tablet     Refill:  0     DNF until 3/11/2023   • gabapentin (NEURONTIN) 300 mg capsule     Sig: Take 1 capsule (300 mg total) by mouth 3 (three) times a day     Dispense:  90 capsule     Refill:  1       My impressions and treatment recommendations were discussed in detail with the patient who verbalized understanding and had no further questions  The patient continues to report an overall reduction of her pain level and improvement with her functioning without significant side effects using hydrocodone/acetaminophen 7 5/325 mg tablet patient takes 1 tablet every 8 hours as needed for pain along with meloxicam 15 mg tablet daily as needed for pain and gabapentin 300 mg twice daily, therefore I will continue the patient on this medication  Hydrocodone/acetaminophen 7 5/325 mg tablet E-prescribed to the patient's pharmacy with a do not fill until date of 3/11/2023 and 4/10/2023  Patient reports that her left hip pain is causing her to fall  At this time I recommend the patient have a x-ray of her left hip since she reports she did land on this hip several times as well as her lumbar spine    I also recommend the patient increase her gabapentin from 300 mg twice daily to 300 mg 3 times daily in hopes of it providing her more pain relief  Patient is in agreement with this plan  [place] Prescription Drug Monitoring Program report was reviewed and was appropriate     There are risks associated with opioid medications, including dependence, addiction and tolerance  The patient understands and agrees to use these medications only as prescribed  Potential side effects of the medications include, but are not limited to, constipation, drowsiness, addiction, impaired judgment and risk of fatal overdose if not taken as prescribed  The patient was warned against driving while taking sedation medications  Sharing medications is a felony  At this point in time, the patient is showing no signs of addiction, abuse, diversion or suicidal ideation  An oral drug screen swab was collected at today's office visit  The swab will be sent for confirmatory testing  The drug screen is medically necessary because the patient is either dependent on opioid medication or is being considered for opioid medication therapy and the results could impact ongoing or future treatment  The drug screen is to evaluate for the presences or absence of prescribed, non-prescribed, and/or illicit drugs/substances  Follow-up is planned in 2 months time or sooner as warranted  Discharge instructions were provided  I personally saw and examined the patient and I agree with the above discussed plan of care  History of Present Illness:    Charmaine Saeed is a 47 y o  female who presents to [facility] for interval re-evaluation of the above stated pain complaints  The patient has a past medical and chronic pain history as outlined in the assessment section  She was last seen on 12/1/2022  At today's visit patient states that her pain symptoms are the same with a pain score of 8 out of 10 on the verbal numeric pain scale  The patient's pain is worse in the morning, evening, and at night    The patient's pain is constant in nature  The quality of the patient's pain is described as sharp and shooting  Patient indicates that her pain is located in her left anterior hip, left knee, and bilateral low back  Pain Contract Signed:  3/1/2023  Last Urine Drug Screen:  9/29/2022  New drug screen: 3/1/2023    Other than as stated above, the patient denies any interval changes in medications, medical condition, mental condition, symptoms, or allergies since the last office visit  Review of Systems:    Review of Systems   Respiratory: Negative for shortness of breath  Cardiovascular: Negative for chest pain  Gastrointestinal: Negative for constipation, diarrhea, nausea and vomiting  Musculoskeletal: Positive for gait problem  Negative for arthralgias, joint swelling and myalgias  Decreased range of motion, joint stiffness, pain in the left groin area and middle low back   Skin: Negative for rash  Neurological: Negative for dizziness, seizures and weakness  All other systems reviewed and are negative  Past Medical History:   Diagnosis Date   • Chronic hip pain, left        Past Surgical History:   Procedure Laterality Date   • ANKLE ARTHROPLASTY     • BREAST LUMPECTOMY     • BREAST SURGERY Bilateral     Enlargement   • EPIDURAL BLOCK INJECTION Left 3/5/2021    Procedure: left L5 transforaminal epidural steroid injection ( 48778); Surgeon: Belkys Barreto MD;  Location: [facility] OR;  Service: Pain Management    • FL INJECTION LEFT ELBOW (NON ARTHROGRAM)  4/7/2022   • LAPAROSCOPY FOR ECTOPIC PREGNANCY      And Salpingectomy   • ORTHOPEDIC SURGERY  9/2018   • SD INJECT SI JOINT ARTHRGRPHY&/ANES/STEROID W/OKSANA Left 4/7/2022    Procedure: SACROILIAC joint injection ([id number] ); Surgeon: Belkys Barreto MD;  Location: [facility] OR;  Service: Pain Management    • STEROID INJECTION HIP Left 4/16/2021    Procedure: Sacroiliac Joint Injection ([id number]);   Surgeon: Belkys Barreto MD;  Location[facility] MA[facility] OR;  Service: Pain Management    • TOTAL HIP ARTHROPLASTY Left 09/2018       Family History   Problem Relation Age of Onset   • Breast cancer Mother    • Arthritis Mother    • Emphysema Mother    • COPD Mother    • Alcohol abuse Father         He has been clean for 2 years   • No Known Problems Sister    • No Known Problems Brother    • No Known Problems Maternal Grandmother    • No Known Problems Maternal Grandfather    • No Known Problems Paternal Grandmother    • No Known Problems Paternal Grandfather    • Breast cancer Maternal Aunt    • Cancer Maternal Aunt    • No Known Problems Maternal Uncle    • No Known Problems Paternal Aunt    • No Known Problems Paternal Uncle        Social History     Occupational History   • Not on file   Tobacco Use   • Smoking status: Never   • Smokeless tobacco: Never   • Tobacco comments:     Former smoker, per allscripts   Vaping Use   • Vaping Use: Never used   Substance and Sexual Activity   • Alcohol use: No     Comment: social drinker, per allscripts   • Drug use: No   • Sexual activity: Not Currently     Partners: Male     Birth control/protection: Post-menopausal         Current Outpatient Medications:   •  gabapentin (NEURONTIN) 300 mg capsule, Take 1 capsule (300 mg total) by mouth 3 (three) times a day, Disp: 90 capsule, Rfl: 1  •  [START ON 4/10/2023] HYDROcodone-acetaminophen (NORCO) 7 5-325 mg per tablet, Take 1 tablet by mouth every 8 (eight) hours as needed for pain Max Daily Amount: 3 tablets Do not start before April 10, 2023 , Disp: 90 tablet, Rfl: 0  •  [START ON 3/11/2023] HYDROcodone-acetaminophen (NORCO) 7 5-325 mg per tablet, Take 1 tablet by mouth every 8 (eight) hours as needed for pain Max Daily Amount: 3 tablets Do not start before March 11, 2023 , Disp: 90 tablet, Rfl: 0  •  meloxicam (MOBIC) 15 mg tablet, Take 1 tablet (15 mg total) by mouth daily Take with food, Disp: 30 tablet, Rfl: 2  •  ondansetron (Zofran ODT) 4 mg disintegrating tablet, Take 1 tablet (4 mg total) by mouth every 6 (six) hours as needed for nausea or vomiting (Patient not taking: Reported on 3/1/2023), Disp: 10 tablet, Rfl: 0    Allergies   Allergen Reactions   • Peach [Prunus Persica] Other (See Comments)     Closure of throat with peaches, apricots,plums , nectarine ,and almonds       Physical Exam:    /78   Pulse 68   Temp 98 8 °F (37 1 °C)   Wt 81 2 kg (179 lb)   BMI 32 74 kg/m²     Constitutional:normal, well developed, well nourished, alert, in no distress and non-toxic and no overt pain behavior   and obese  Eyes:anicteric  HEENT:grossly intact  Neck:supple, symmetric, trachea midline and no masses   Pulmonary:even and unlabored  Cardiovascular:No edema or pitting edema present  Skin:Normal without rashes or lesions and well hydrated  Psychiatric:Mood and affect appropriate  Neurologic:Cranial Nerves II-XII grossly intact  Musculoskeletal:antalgic       Imaging  XR hip/pelv 2-3 vws left if performed    (Results Pending)   X-ray lumbar spine 2 or 3 views    (Results Pending)         Orders Placed This Encounter   Procedures   • MM OF_Alprazolam Definitive   • MM OF_Amphetamine Definitive Test   • MM OF_Buprenorphine Definitive Test   • MM OF_Carisoprodol Definitive Test   • MM OF_Clonazepam Definitive   • MM OF_Cocaine Definitive Test   • MM OF_Codeine Definitive   • MM OF_Dextromethorphan Definitive Test   • MM OF_Diazepam Definitive   • MM OF_Fentanyl Definitive Test   • MM OF_Gabapentin Definitive Test   • MM OF_Heroin Definitive Test   • MM OF_Hydrocodone Definitive   • MM OF_Hydromorphone Definitive   • MM OF_Levorphanol Definitive Test   • MM OF_Lorazepam Definitive   • MM OF_MDMA Definitive Test   • MM OF_Meperidine Definitive Test   • MM OF_Methadone Definitive Test   • MM OF_Methylphenidate Definitive Test   • MM OF_Morphine Definitive   • MM OF_Naltrexone Definitive Test   • MM OF_Oxazepam Definitive   • MM OF_Oxycodone Definitive Test - Oral Fluid   • MM OF_Oxymorphone Definitive Test   • MM OF_Phencyclidine Definitive Test   • MM OF_Pregabalin Definitive Test   • MM OF_Tapentadol Definitive Test   • MM OF_Temazepam Definitive   • MM OF_THC Definitive Test   • MM OF_Tramadol Definitive Test   • XR hip/pelv 2-3 vws left if performed   • X-ray lumbar spine 2 or 3 views     This document was created using speech voice recognition software  Grammatical errors, random word insertions, pronoun errors, and incomplete sentences are an occasional consequence of this system due to software limitations, ambient noise, and hardware issues  Any formal questions or concerns about content, text, or information contained within the body of this dictation should be directly addressed to the provider for clarification

## 2023-03-01 NOTE — PATIENT INSTRUCTIONS
Opioid Safety   AMBULATORY CARE:   Opioid safety  includes the correct use, storage, and disposal of opioids  Examples of opioid medicines to treat pain include oxycodone, morphine, fentanyl, and codeine  Call your local emergency number (911 in the [address]), or have someone else call if:   You have a seizure  You cannot be woken  You have trouble staying awake and your breathing is slow or shallow  Your speech is slurred, or you are confused  You are dizzy or stumble when you walk  Call your doctor, or have someone close to you call if:   You are extremely drowsy, or you have trouble staying awake or speaking  You have pale or clammy skin  You have blue fingernails or lips  Your heartbeat is slower than normal     You cannot stop vomiting  You have questions or concerns about your condition or care  Use opioids safely:   Take prescribed opioids exactly as directed  Opioids come with directions based on the kind of opioid and how it is given  Do not take more than the recommended amount, or for longer than needed  Do not give opioids to others or take opioids that belong to someone else  Misuse of opioids can lead to an addiction or overdose  Do not mix opioids with other medicines or alcohol  The combination can cause an overdose, or lead to a coma  Do not drive or operate heavy machinery after you take the opioid  Your provider or pharmacist can tell you how long to wait after a dose before you do these activities  Talk to your healthcare provider if you have any side effects  He or she can help you prevent or relieve side effects  Side effects include nausea, sleepiness, itching, and trouble thinking clearly  Manage constipation:  Constipation is the most common side effect of opioid medicine  Constipation is when you have hard, dry bowel movements, or you go longer than usual between bowel movements   Tell your healthcare provider about all changes in your bowel movements while you are taking opioids  He or she may recommend laxative medicine to help you have a bowel movement  He or she may also change the kind of opioid you are taking, or change when you take it  The following are more ways you can prevent or relieve constipation:  Drink liquids as directed  You may need to drink extra liquids to help soften and move your bowels  Ask how much liquid to drink each day and which liquids are best for you  Eat high-fiber foods  This may help decrease constipation by adding bulk to your bowel movements  High-fiber foods include fruits, vegetables, whole-grain breads and cereals, and beans  Your healthcare provider or dietitian can help you create a high-fiber meal plan  Your provider may also recommend a fiber supplement if you cannot get enough fiber from food  Exercise regularly  Regular physical activity can help stimulate your intestines  Walking is a good exercise to prevent or relieve constipation  Ask which exercises are best for you  Schedule a time each day to have a bowel movement  This may help train your body to have regular bowel movements  Bend forward while you are on the toilet to help move the bowel movement out  Sit on the toilet for at least 10 minutes, even if you do not have a bowel movement  Store opioids safely:   Store opioids where others cannot easily get them  Keep them in a locked cabinet or secure area  Do not  keep them in a purse or other bag you carry with you  A person may be looking for something else and find the opioids  Make sure opioids are stored out of the reach of children  A child can easily overdose on opioids  Opioids may look like candy to a small child  The best way to dispose of opioids: The laws vary by country and area  In the [place], the best way is to return the opioids through a take-back program  This program is offered by the [employer] (REAL)   The following are options for using the program:  Take the opioids to a REAL collection site  The site is often a law enforcement center  Call your local law enforcement center for scheduled take-back days in your area  You will be given information on where to go if the collection site is in a different location  Take the opioids to an approved pharmacy or hospital   A pharmacy or hospital may be set up as a collection site  You will need to ask if it is a REAL collection site if you were not directed there  A pharmacy or doctor's office may not be able to take back opioids unless it is a REAL site  Use a mail-back system  This means you are given containers to put the opioids into  You will then mail them in the containers  Use a take-back drop box  This is a place to leave the opioids at any time  People and animals will not be able to get into the box  Your local law enforcement agency can tell you where to find a drop box in your area  Other safe ways to dispose of opioids: The medicine may come with disposal instructions  The instructions may vary depending on the brand of medicine you are using  Instructions may come in a Medication Guide, but not every medicine has one  You may instead get instructions from your pharmacy or doctor  Follow instructions carefully  The following are general guidelines to follow:  Find out if you can flush the opioid  Some opioids can be flushed down the toilet or poured into the sink  You will need to contact authorities in your area to see if this is an option for you  The FDA also offers a list of medicines that are safe to flush down the toilet  You can check the list if you cannot get the information for your local area  Ask your waste management company about rules for putting opioids in the trash  The company will be able to give you specific directions  Scratch out personal information on the original medicine label so it cannot be read  Then put it in the trash   Do not label the trash or put any information on it about the opioids  It should look like regular household trash so no one is tempted to look for the opioids  Keep the trash out of the reach of children and animals  Always make sure trash is secure  Talk to officials if you live in a facility  If you live in a nursing home or assisted living center, talk to an official  The person will know the rules for your area  Other ways to manage pain:   Ask your healthcare provider about non-opioid medicines to control pain  Nonprescription medicines include NSAIDs (such as ibuprofen) and acetaminophen  Prescription medicines include muscle relaxers, antidepressants, and steroids  Pain may be managed without any medicines  Some ways to relieve pain include massage, aromatherapy, or meditation  Physical or occupational therapy may also help  For more information:   Drug Enforcement Administration  [facility] [address]  Phone: [phone]  Web Address: [facility]/drug_disposal/    [address]  and Drug Administration  [facility] , [address] [place] [address]  Phone: 2- [phone]  Web Address: http://[employer]/    Follow up with your doctor or pain specialist as directed: You may need to have your dose adjusted  Your doctor or pain specialist can also help you find ways to manage pain without opioids  Write down your questions so you remember to ask them during your visits  © Copyright [employer] 2022 Information is for End User's use only and may not be sold, redistributed or otherwise used for commercial purposes  All illustrations and images included in CareNotes® are the copyrighted property of [employer]  or Benson Chaidez   The above information is an  only  It is not intended as medical advice for individual conditions or treatments   Talk to your doctor, nurse or pharmacist before following any medical regimen to see if it is safe and effective for you  Gabapentin (By mouth)   Gabapentin (claudio-a-PEN-tin)  Treats seizures and pain caused by shingles  Brand Name(s): [employer]q Fanatrex, Neurontin   There may be other brand names for this medicine  When This Medicine Should Not Be Used: This medicine is not right for everyone  Do not use it if you had an allergic reaction to gabapentin  How to Use This Medicine:   Capsule, Liquid, Tablet  Take your medicine as directed  Your dose may need to be changed several times to find what works best for you  If you have epilepsy, do not allow more than 12 hours to pass between doses  Capsule: Swallow the capsule whole with plenty of water  Do not open, crush, or chew it  Gralise® tablet: Swallow the tablet whole   Do not crush, break, or chew it  Neurontin® tablet: If you break a tablet into 2 pieces, use the second half as your next dose  Do not use the half-tablet if the whole tablet has been cut or broken after 28 days  Oral liquid: Measure the oral liquid medicine with a marked measuring spoon, oral syringe, or medicine cup  This medicine should come with a Medication Guide  Ask your pharmacist for a copy if you do not have one  Missed dose: Take a dose as soon as you remember  If it is almost time for your next dose, wait until then and take a regular dose  Do not take extra medicine to make up for a missed dose  Store the medicine in a closed container at room temperature, away from heat, moisture, and direct light  Store the Neurontin® oral liquid in the refrigerator  Do not freeze  Drugs and Foods to Avoid:   Ask your doctor or pharmacist before using any other medicine, including over-the-counter medicines, vitamins, and herbal products  Some medicines can affect how gabapentin works  Tell your doctor if you also using hydrocodone or morphine  If you take an antacid, wait at least 2 hours before you take gabapentin    Do not drink alcohol while you are using this medicine  Tell your doctor if you use anything else that makes you sleepy  Some examples are allergy medicine, narcotic pain medicine, and alcohol  Tell your doctor if you are also using lorazepam, oxycodone, or zolpidem  Warnings While Using This Medicine:   Tell your doctor if you are pregnant or breastfeeding, or if you have kidney problems (including patients receiving dialysis) or lung problems  Tell your doctor if you have a history of depression or mental health problems  This medicine may cause the following problems:  Drug reaction with eosinophilia and systemic symptoms (DRESS) or multiorgan hypersensitivity, which may damage the liver, kidney, blood, heart, or muscles  Changes in mood or behavior, including suicidal thoughts or behavior  Respiratory depression (serious breathing problem that can be life-threatening), when used with narcotic pain medicines  Do not stop using this medicine suddenly  Your doctor will need to slowly decrease your dose before you stop it completely  This medicine may make you dizzy or drowsy  Do not drive or do anything else that could be dangerous until you know how this medicine affects you  Tell any doctor or dentist who treats you that you are using this medicine  This medicine may affect certain medical test results  Your doctor will check your progress and the effects of this medicine at regular visits  Keep all appointments  Keep all medicine out of the reach of children  Never share your medicine with anyone  Possible Side Effects While Using This Medicine:   Call your doctor right away if you notice any of these side effects:   Allergic reaction: Itching or hives, swelling in your face or hands, swelling or tingling in your mouth or throat, chest tightness, trouble breathing  Behavior problems, aggression, restlessness, trouble concentrating, moodiness (especially in children)  Blistering, peeling, red skin rash  Blue lips, fingernails, or skin, chest pain, fast heartbeat, trouble breathing  Change in how much or how often you urinate, bloody or cloudy urine  Dark urine or pale stools, nausea, vomiting, loss of appetite, stomach pain, yellow skin or eyes  Fever, chills, cough, sore throat, body aches  Problems with coordination, shakiness, unsteadiness, unusual eye movement  Rapid weight gain, swelling in your hands, ankles, or feet  Rash, swollen or tender glands in the neck, armpit, or groin  Unusual moods or behaviors, thoughts of hurting yourself, feeling depressed  If you notice these less serious side effects, talk with your doctor:   Dizziness, drowsiness, sleepiness, tiredness  If you notice other side effects that you think are caused by this medicine, tell your doctor  Call your doctor for medical advice about side effects  You may report side effects to FDA at 4-113-FDA-8443    © Copyright [address] 2022 Information is for End User's use only and may not be sold, redistributed or otherwise used for commercial purposes  The above information is an  only  It is not intended as medical advice for individual conditions or treatments  Talk to your doctor, nurse or pharmacist before following any medical regimen to see if it is safe and effective for you

## 2023-03-03 LAB
7AMINOCLONAZEPAM SAL QL CFM: NEGATIVE NG/ML
AMPHET SAL QL CFM: NEGATIVE NG/ML
BUPRENORPHINE SAL QL SCN: NEGATIVE NG/ML
CARBOXYTHC SAL QL CFM: NEGATIVE NG/ML
CCP IGG SERPL-ACNC: NEGATIVE
COCAINE SAL QL CFM: NEGATIVE NG/ML
CODEINE SAL QL CFM: NEGATIVE NG/ML
D-METHORPHAN SAL QL CFM: NEGATIVE NG/ML
DXO+LEVORPHANOL SAL QL CFM: NEGATIVE NG/ML
DXO+LEVORPHANOL SAL QL CFM: NEGATIVE NG/ML
EDDP SAL QL CFM: NEGATIVE NG/ML
GABAPENTIN SAL QL CFM: ABNORMAL NG/ML
HYDROCODONE SAL QL CFM: ABNORMAL NG/ML
HYDROCODONE SAL QL CFM: ABNORMAL NG/ML
HYDROMORPHONE SAL QL CFM: NEGATIVE NG/ML
LEUKEMIA MARKERS BLD-IMP: NEGATIVE NG/ML
M PROTEIN 3 UR ELPH-MCNC: NEGATIVE NG/ML
M TB TUBERC IGNF/MITOGEN IGNF CONTROL: NEGATIVE NG/ML
METHADONE SAL QL CFM: NEGATIVE NG/ML
MORPHINE SAL QL CFM: NEGATIVE NG/ML
MORPHINE SAL QL CFM: NEGATIVE NG/ML
NALTREXOL SAL QL CFM: NEGATIVE NG/ML
NALTREXONE SAL QL CFM: NEGATIVE NG/ML
OXYMORPHONE SAL QL CFM: NEGATIVE NG/ML
OXYMORPHONE SAL QL CFM: NEGATIVE NG/ML
PCP SAL QL CFM: NEGATIVE NG/ML
PREGABALIN SAL QL CFM: NEGATIVE NG/ML
SL AMB 6-MAM (HEROIN METABOLITE) QUANTIFICATION: NEGATIVE NG/ML
SL AMB ALPRAZOLAM QUANTIFICATION: NEGATIVE NG/ML
SL AMB CARISOPRODOL QUANTIFICATION: NEGATIVE NG/ML
SL AMB CLONAZEPAM QUANTIFICATION: NEGATIVE NG/ML
SL AMB DIAZEPAM QUANTIFICATION: NEGATIVE NG/ML
SL AMB FENTANYL QUANTIFICATION: NEGATIVE NG/ML
SL AMB MDMA QUANTIFICATION: NEGATIVE NG/ML
SL AMB MEPROBAMATE QUANTIFICATION: NEGATIVE NG/ML
SL AMB N-DESMETHYL-TRAMADOL QUANTIFICATION SALIVA: NEGATIVE NG/ML
SL AMB NORBUPRENORPHINE QUANTIFICATION: NEGATIVE NG/ML
SL AMB NORDIAZEPAM QUANTIFICATION: NEGATIVE NG/ML
SL AMB NORFENTANYL QUANTIFICATION: NEGATIVE NG/ML
SL AMB NORHYDROCODONE QUANTIFICATION: ABNORMAL NG/ML
SL AMB NORHYDROCODONE QUANTIFICATION: ABNORMAL NG/ML
SL AMB NORMEPERIDINE QUANTIFICATION: NEGATIVE NG/ML
SL AMB NOROXYCODONE QUANTIFICATION: NEGATIVE NG/ML
SL AMB OXAZEPAM QUANTIFICATION: NEGATIVE NG/ML
SL AMB RITALINIC ACID QUANTIFICATION: NEGATIVE
SL AMB TEMAZEPAM QUANTIFICATION: NEGATIVE NG/ML
SL AMB TEMAZEPAM QUANTIFICATION: NEGATIVE NG/ML
SL AMB TRAMADOL QUANTIFICATION: NEGATIVE NG/ML
SQUAMOUS #/AREA URNS HPF: NEGATIVE NG/ML
TAPENTADOL SAL QL CFM: NEGATIVE NG/ML

## 2023-03-10 DIAGNOSIS — G89.4 CHRONIC PAIN SYNDROME: ICD-10-CM

## 2023-03-10 RX ORDER — HYDROCODONE BITARTRATE AND ACETAMINOPHEN 7.5; 325 MG/1; MG/1
1 TABLET ORAL EVERY 8 HOURS PRN
Qty: 90 TABLET | Refills: 0 | Status: SHIPPED | OUTPATIENT
Start: 2023-03-10 | End: 2023-04-09

## 2023-03-24 ENCOUNTER — OFFICE VISIT (OUTPATIENT)
Dept: OBGYN CLINIC | Facility: CLINIC | Age: 53
End: 2023-03-24

## 2023-03-24 ENCOUNTER — APPOINTMENT (OUTPATIENT)
Dept: RADIOLOGY | Facility: CLINIC | Age: 53
End: 2023-03-24

## 2023-03-24 VITALS
HEART RATE: 73 BPM | BODY MASS INDEX: 32.94 KG/M2 | TEMPERATURE: 98.3 F | HEIGHT: 62 IN | WEIGHT: 179 LBS | SYSTOLIC BLOOD PRESSURE: 136 MMHG | DIASTOLIC BLOOD PRESSURE: 82 MMHG

## 2023-03-24 DIAGNOSIS — M25.552 PAIN IN LEFT HIP: ICD-10-CM

## 2023-03-24 DIAGNOSIS — M79.645 PAIN OF LEFT THUMB: ICD-10-CM

## 2023-03-24 DIAGNOSIS — M65.312 TRIGGER THUMB OF LEFT HAND: Primary | ICD-10-CM

## 2023-03-24 DIAGNOSIS — Z96.642 S/P HIP REPLACEMENT, LEFT: ICD-10-CM

## 2023-03-24 RX ORDER — BETAMETHASONE SODIUM PHOSPHATE AND BETAMETHASONE ACETATE 3; 3 MG/ML; MG/ML
3 INJECTION, SUSPENSION INTRA-ARTICULAR; INTRALESIONAL; INTRAMUSCULAR; SOFT TISSUE
Status: COMPLETED | OUTPATIENT
Start: 2023-03-24 | End: 2023-03-24

## 2023-03-24 RX ORDER — LIDOCAINE HYDROCHLORIDE 5 MG/ML
1 INJECTION, SOLUTION INFILTRATION; PERINEURAL
Status: COMPLETED | OUTPATIENT
Start: 2023-03-24 | End: 2023-03-24

## 2023-03-24 RX ADMIN — LIDOCAINE HYDROCHLORIDE 1 ML: 5 INJECTION, SOLUTION INFILTRATION; PERINEURAL at 08:47

## 2023-03-24 RX ADMIN — BETAMETHASONE SODIUM PHOSPHATE AND BETAMETHASONE ACETATE 3 MG: 3; 3 INJECTION, SUSPENSION INTRA-ARTICULAR; INTRALESIONAL; INTRAMUSCULAR; SOFT TISSUE at 08:47

## 2023-03-24 NOTE — PROGRESS NOTES
Assessment/Plan:  1  Trigger thumb of left hand        2  Pain of left thumb          Scribe Attestation    I,:  Mike Brizuela am acting as a scribe while in the presence of the attending physician :       I,:  Jennifer Finnegan MD personally performed the services described in this documentation    as scribed in my presence :         After thorough history and physical examination, I explained to Froylan Rodriguez that she is suffering with left trigger thumb  I offered to perform a corticosteroid injection today for symptomatic relief  She consented to and underwent the injection as documented below without issue or complication  This was well-tolerated by the patient  She understands this can be repeated no sooner than 6 weeks if her symptoms persist   We will plan to see her back around that time to consider repeat injection if needed  If her symptoms are resolved, she will follow-up as needed  Hand/upper extremity injection: L thumb A1  Universal Protocol:  Consent: Verbal consent obtained  Risks and benefits: risks, benefits and alternatives were discussed  Consent given by: patient  Time out: Immediately prior to procedure a "time out" was called to verify the correct patient, procedure, equipment, support staff and site/side marked as required    Patient understanding: patient states understanding of the procedure being performed  Site marked: the operative site was marked  Patient identity confirmed: verbally with patient    Supporting Documentation  Indications: tendon swelling and pain   Procedure Details  Condition:trigger finger Location: thumb - L thumb A1   Preparation: Patient was prepped and draped in the usual sterile fashion  Needle size: 25 G  Ultrasound guidance: no  Approach: volar  Medications administered: 1 mL lidocaine 0 5 %; 3 mg betamethasone acetate-betamethasone sodium phosphate 6 (3-3) mg/mL    Patient tolerance: patient tolerated the procedure well with no immediate complications  Dressing:  Sterile dressing applied           Subjective: Initial evaluation for left thumb    Patient ID: Kamran Sen is a RHD 47 y o  female who presents today for initial evaluation of her left thumb  At today's visit, she reports that her left thumb has been "stuck" for the last month  She also complains of pain into the radial aspect of her wrist at times  She is right-hand dominant and denies a history of diabetes  She notes pain at the base of her thumb and difficulty with activity, particularly gripping objects  She denies any acute injury or trauma  Review of Systems   Constitutional: Positive for activity change  Negative for chills, fever and unexpected weight change  HENT: Negative for hearing loss, nosebleeds and sore throat  Eyes: Negative for pain, redness and visual disturbance  Respiratory: Negative for cough, shortness of breath and wheezing  Cardiovascular: Negative for chest pain, palpitations and leg swelling  Gastrointestinal: Negative for abdominal pain, nausea and vomiting  Endocrine: Negative for polydipsia and polyuria  Genitourinary: Negative for dysuria and hematuria  Musculoskeletal: Positive for arthralgias  Negative for joint swelling and myalgias  See HPI   Skin: Negative for rash and wound  Neurological: Negative for dizziness, numbness and headaches  Psychiatric/Behavioral: Negative for decreased concentration and suicidal ideas  The patient is not nervous/anxious  Past Medical History:   Diagnosis Date   • Chronic hip pain, left        Past Surgical History:   Procedure Laterality Date   • ANKLE ARTHROPLASTY     • BREAST LUMPECTOMY     • BREAST SURGERY Bilateral     Enlargement   • EPIDURAL BLOCK INJECTION Left 3/5/2021    Procedure: left L5 transforaminal epidural steroid injection ( 75221);   Surgeon: Jorje Cervantes MD;  Location: [facility] MAIN OR;  Service: Pain Management    • FL INJECTION LEFT ELBOW (NON ARTHROGRAM) 4/7/2022   • LAPAROSCOPY FOR ECTOPIC PREGNANCY      And Salpingectomy   • ORTHOPEDIC SURGERY  9/2018   • IN INJECT SI JOINT ARTHRGRPHY&/ANES/STEROID W/OKSANA Left 4/7/2022    Procedure: SACROILIAC joint injection (42619 ); Surgeon: Carol Winter MD;  Location: [facility] MAIN OR;  Service: Pain Management    • STEROID INJECTION HIP Left 4/16/2021    Procedure: Sacroiliac Joint Injection (15929);   Surgeon: Carol Winter MD;  Location: [facility] MAIN OR;  Service: Pain Management    • TOTAL HIP ARTHROPLASTY Left 09/2018       Family History   Problem Relation Age of Onset   • Breast cancer Mother    • Arthritis Mother    • Emphysema Mother    • COPD Mother    • Alcohol abuse Father         He has been clean for 2 years   • No Known Problems Sister    • No Known Problems Brother    • No Known Problems Maternal Grandmother    • No Known Problems Maternal Grandfather    • No Known Problems Paternal Grandmother    • No Known Problems Paternal Grandfather    • Breast cancer Maternal Aunt    • Cancer Maternal Aunt    • No Known Problems Maternal Uncle    • No Known Problems Paternal Aunt    • No Known Problems Paternal Uncle        Social History     Occupational History   • Not on file   Tobacco Use   • Smoking status: Never   • Smokeless tobacco: Never   • Tobacco comments:     Former smoker, per allscripts   Vaping Use   • Vaping Use: Never used   Substance and Sexual Activity   • Alcohol use: No     Comment: social drinker, per allscripts   • Drug use: No   • Sexual activity: Not Currently     Partners: Male     Birth control/protection: Post-menopausal         Current Outpatient Medications:   •  gabapentin (NEURONTIN) 300 mg capsule, Take 1 capsule (300 mg total) by mouth 3 (three) times a day, Disp: 90 capsule, Rfl: 1  •  [START ON 4/10/2023] HYDROcodone-acetaminophen (NORCO) 7 5-325 mg per tablet, Take 1 tablet by mouth every 8 (eight) hours as needed for pain Max Daily Amount: 3 tablets Do not start before April 10, 2023 , Disp: 90 tablet, Rfl: 0  •  HYDROcodone-acetaminophen (NORCO) 7 5-325 mg per tablet, Take 1 tablet by mouth every 8 (eight) hours as needed for pain Max Daily Amount: 3 tablets, Disp: 90 tablet, Rfl: 0  •  meloxicam (MOBIC) 15 mg tablet, Take 1 tablet (15 mg total) by mouth daily Take with food, Disp: 30 tablet, Rfl: 2  •  ondansetron (Zofran ODT) 4 mg disintegrating tablet, Take 1 tablet (4 mg total) by mouth every 6 (six) hours as needed for nausea or vomiting (Patient not taking: Reported on 3/1/2023), Disp: 10 tablet, Rfl: 0    Allergies   Allergen Reactions   • Peach [Prunus Persica] Other (See Comments)     Closure of throat with peaches, apricots,plums , nectarine ,and almonds       Objective:  Vitals:    03/24/23 0809   BP: 136/82   Pulse: 73   Temp: 98 3 °F (36 8 °C)       Body mass index is 32 74 kg/m²  Right Hand Exam     Range of Motion   Wrist   Extension: normal   Flexion: normal       Left Hand Exam     Range of Motion   Wrist   Extension: normal   Flexion: normal   Hand   MP Thumb: normal   DIP Thumb: abnormal     Other   Erythema: absent  Scars: absent  Sensation: normal  Pulse: present    Comments:  Palpable click at A1 pulley with tenderness            Physical Exam  Vitals and nursing note reviewed  Constitutional:       Appearance: Normal appearance  She is well-developed  HENT:      Head: Normocephalic and atraumatic  Right Ear: External ear normal       Left Ear: External ear normal    Eyes:      General: No scleral icterus  Extraocular Movements: Extraocular movements intact  Conjunctiva/sclera: Conjunctivae normal    Cardiovascular:      Rate and Rhythm: Normal rate  Pulmonary:      Effort: Pulmonary effort is normal  No respiratory distress  Musculoskeletal:      Cervical back: Normal range of motion and neck supple  Comments: See Ortho exam   Skin:     General: Skin is warm and dry     Neurological:      Mental Status: She is alert and oriented to person, place, and time  Psychiatric:         Behavior: Behavior normal            This document was created using speech voice recognition software  Grammatical errors, random word insertions, pronoun errors, and incomplete sentences are an occasional consequence of this system due to software limitations, ambient noise, and hardware issues  Any formal questions or concerns about content, text, or information contained within the body of this dictation should be directly addressed to the provider for clarification

## 2023-04-03 ENCOUNTER — TELEPHONE (OUTPATIENT)
Dept: PAIN MEDICINE | Facility: CLINIC | Age: 53
End: 2023-04-03

## 2023-04-03 NOTE — TELEPHONE ENCOUNTER
----- Message from Ramesh Gamez, [address] sent at 4/2/2023  6:13 PM EDT -----  Left total hip arthroplasty is identified in satisfactory position without evidence of hardware complication

## 2023-05-01 ENCOUNTER — TELEPHONE (OUTPATIENT)
Dept: PAIN MEDICINE | Facility: CLINIC | Age: 53
End: 2023-05-01

## 2023-05-01 ENCOUNTER — OFFICE VISIT (OUTPATIENT)
Dept: PAIN MEDICINE | Facility: CLINIC | Age: 53
End: 2023-05-01

## 2023-05-01 VITALS
TEMPERATURE: 98.2 F | HEART RATE: 78 BPM | BODY MASS INDEX: 32.56 KG/M2 | SYSTOLIC BLOOD PRESSURE: 123 MMHG | WEIGHT: 178 LBS | DIASTOLIC BLOOD PRESSURE: 75 MMHG

## 2023-05-01 DIAGNOSIS — M70.72 ILIOPSOAS BURSITIS OF LEFT HIP: ICD-10-CM

## 2023-05-01 DIAGNOSIS — M25.552 LEFT HIP PAIN: ICD-10-CM

## 2023-05-01 DIAGNOSIS — G89.4 CHRONIC PAIN SYNDROME: Primary | ICD-10-CM

## 2023-05-01 DIAGNOSIS — M46.1 SACROILIITIS (HCC): ICD-10-CM

## 2023-05-01 RX ORDER — GABAPENTIN 300 MG/1
300 CAPSULE ORAL 3 TIMES DAILY
Qty: 90 CAPSULE | Refills: 0 | Status: SHIPPED | OUTPATIENT
Start: 2023-05-01

## 2023-05-01 RX ORDER — HYDROCODONE BITARTRATE AND ACETAMINOPHEN 7.5; 325 MG/1; MG/1
1 TABLET ORAL 3 TIMES DAILY PRN
Qty: 90 TABLET | Refills: 0 | Status: SHIPPED | OUTPATIENT
Start: 2023-05-10 | End: 2023-06-09

## 2023-05-01 RX ORDER — HYDROCODONE BITARTRATE AND ACETAMINOPHEN 7.5; 325 MG/1; MG/1
1 TABLET ORAL 3 TIMES DAILY PRN
Qty: 90 TABLET | Refills: 0 | Status: SHIPPED | OUTPATIENT
Start: 2023-06-09 | End: 2023-07-09

## 2023-05-01 NOTE — TELEPHONE ENCOUNTER
Scheduled pt for Iliopsoas burs injection 5/12/23  Went over pre-procedure instructions below:  Nothing to eat or drink 1 hr prior to procedure  Need to arrange transportation  Proper clothing for procedure  No vaccines 2 weeks prior or after procedure  If ill or placed on antibiotics please call to reschedule

## 2023-05-01 NOTE — H&P (VIEW-ONLY)
Pain Medicine Follow-Up Note    Assessment:  1  Chronic pain syndrome    2  Left hip pain    3  Iliopsoas bursitis of left hip        Plan:  My impressions and treatment recommendations were discussed in detail with the patient who verbalized understanding and had no further questions  Given that the patient has signs and symptoms consistent with left iliopsoas bursitis, I discussed the rationale of undergoing a left iliopsoas bursa injection under fluoroscopic guidance since this could be potentially therapeutic  The procedures, its risks, and benefits were explained in detail to the patient  Risks include but are not limited to bleeding, infection, hematoma formation, abscess formation, weakness, headache, failure the pain to improve, nerve irritation or damage, and potential worsening of the pain  The patient verbalized understanding and wished to proceed with the procedure  I also felt it reasonable to continue the patient on hydrocodone/acetaminophen 7 5/325 mg 1 tablet 3 times daily as needed for pain  I sent the prescriptions to the pharmacy  May 10, 2023 and June 9, 2023  Follow-up is planned in 2 months time or sooner as warranted  Discharge instructions were provided  I personally saw and examined the patient and I agree with the above discussed plan of care  History of Present Illness:    Hakan Boswell is a 47 y o  female who presents to [facility] for interval re-evaluation of the above stated pain complaints  The patient has a past medical and chronic pain history as outlined in the assessment section  She was last seen on March 1, 2023  At today's office visit, the patient's pain score is 7 out of 10 on the verbal numerical pain rating scale  The patient states that her pain is worse in the morning, evening, and night  Her pain is constant in nature  She reports the quality of her pain is sharp and shooting    She states that her symptoms are primarily in the left hip region  She would like to undergo an interventional pain procedure today to help with her pain  She believes that she has signs and symptoms consistent with left iliopsoas bursitis  Other than as stated above, the patient denies any interval changes in medications, medical condition, mental condition, symptoms, or allergies since the last office visit  Review of Systems:    Review of Systems   Constitutional: Negative for unexpected weight change  HENT: Negative for ear pain  Eyes: Negative for visual disturbance  Respiratory: Negative for shortness of breath and wheezing  Gastrointestinal: Negative for abdominal pain  Musculoskeletal: Positive for gait problem  Negative for back pain  Groin pain left side, muscle pain    Neurological: Positive for weakness  Negative for numbness  Psychiatric/Behavioral: Positive for sleep disturbance  Negative for decreased concentration  The patient is nervous/anxious  Past Medical History:   Diagnosis Date   • Chronic hip pain, left        Past Surgical History:   Procedure Laterality Date   • ANKLE ARTHROPLASTY     • BREAST LUMPECTOMY     • BREAST SURGERY Bilateral     Enlargement   • EPIDURAL BLOCK INJECTION Left 3/5/2021    Procedure: left L5 transforaminal epidural steroid injection ( 91382); Surgeon: Franck Ann MD;  Location: [facility] OR;  Service: Pain Management    • FL INJECTION LEFT ELBOW (NON ARTHROGRAM)  4/7/2022   • LAPAROSCOPY FOR ECTOPIC PREGNANCY      And Salpingectomy   • ORTHOPEDIC SURGERY  9/2018   • [place] INJECT SI JOINT ARTHRGRPHY&/ANES/STEROID W/OKSANA Left 4/7/2022    Procedure: SACROILIAC joint injection (60123 ); Surgeon: Franck Ann MD;  Location: [facility] OR;  Service: Pain Management    • STEROID INJECTION HIP Left 4/16/2021    Procedure: Sacroiliac Joint Injection (09894);   Surgeon: Franck Ann MD;  Location: [facility] OR;  Service: Pain Management    • TOTAL HIP ARTHROPLASTY Left 09/2018       Family History   Problem Relation Age of Onset   • Breast cancer Mother    • Arthritis Mother    • Emphysema Mother    • COPD Mother    • Alcohol abuse Father         He has been clean for 2 years   • No Known Problems Sister    • No Known Problems Brother    • No Known Problems Maternal Grandmother    • No Known Problems Maternal Grandfather    • No Known Problems Paternal Grandmother    • No Known Problems Paternal Grandfather    • Breast cancer Maternal Aunt    • Cancer Maternal Aunt    • No Known Problems Maternal Uncle    • No Known Problems Paternal Aunt    • No Known Problems Paternal Uncle        Social History     Occupational History   • Not on file   Tobacco Use   • Smoking status: Never   • Smokeless tobacco: Never   • Tobacco comments:     Former smoker, per allscripts   Vaping Use   • Vaping Use: Never used   Substance and Sexual Activity   • Alcohol use: No     Comment: social drinker, per allscripts   • Drug use: No   • Sexual activity: Not Currently     Partners: Male     Birth control/protection: Post-menopausal         Current Outpatient Medications:   •  gabapentin (NEURONTIN) 300 mg capsule, take 1 capsule by mouth three times a day, Disp: 90 capsule, Rfl: 0  •  [START ON 5/10/2023] HYDROcodone-acetaminophen (NORCO) 7 5-325 mg per tablet, Take 1 tablet by mouth 3 (three) times a day as needed for pain Max Daily Amount: 3 tablets Do not start before May 10, 2023 , Disp: 90 tablet, Rfl: 0  •  [START ON 6/9/2023] HYDROcodone-acetaminophen (NORCO) 7 5-325 mg per tablet, Take 1 tablet by mouth 3 (three) times a day as needed for pain Max Daily Amount: 3 tablets Do not start before June 9, 2023 , Disp: 90 tablet, Rfl: 0  •  meloxicam (MOBIC) 15 mg tablet, Take 1 tablet (15 mg total) by mouth daily Take with food, Disp: 30 tablet, Rfl: 2  •  ondansetron (Zofran ODT) 4 mg disintegrating tablet, Take 1 tablet (4 mg total) by mouth every 6 (six) hours as needed for nausea or vomiting (Patient not taking: Reported on 3/1/2023), Disp: 10 tablet, Rfl: 0    Allergies   Allergen Reactions   • Peach [Prunus Persica] Other (See Comments)     Closure of throat with peaches, apricots,plums , nectarine ,and almonds       Physical Exam:    /75   Pulse 78   Temp 98 2 °F (36 8 °C)   Wt 80 7 kg (178 lb)   BMI 32 56 kg/m²     Constitutional:obese  Eyes:anicteric  HEENT:grossly intact  Neck:supple, symmetric, trachea midline and no masses   Pulmonary:even and unlabored  Cardiovascular:No edema or pitting edema present  Skin:Normal without rashes or lesions and well hydrated  Psychiatric:Mood and affect appropriate  Neurologic:Cranial Nerves II-XII grossly intact  Musculoskeletal:antalgic

## 2023-05-01 NOTE — PROGRESS NOTES
Pain Medicine Follow-Up Note    Assessment:  1  Chronic pain syndrome    2  Left hip pain    3  Iliopsoas bursitis of left hip        Plan:  My impressions and treatment recommendations were discussed in detail with the patient who verbalized understanding and had no further questions  Given that the patient has signs and symptoms consistent with left iliopsoas bursitis, I discussed the rationale of undergoing a left iliopsoas bursa injection under fluoroscopic guidance since this could be potentially therapeutic  The procedures, its risks, and benefits were explained in detail to the patient  Risks include but are not limited to bleeding, infection, hematoma formation, abscess formation, weakness, headache, failure the pain to improve, nerve irritation or damage, and potential worsening of the pain  The patient verbalized understanding and wished to proceed with the procedure  I also felt it reasonable to continue the patient on hydrocodone/acetaminophen 7 5/325 mg 1 tablet 3 times daily as needed for pain  I sent the prescriptions to the pharmacy  May 10, 2023 and June 9, 2023  Follow-up is planned in 2 months time or sooner as warranted  Discharge instructions were provided  I personally saw and examined the patient and I agree with the above discussed plan of care  History of Present Illness:    Rose Mckeon is a 47 y o  female who presents to [facility] for interval re-evaluation of the above stated pain complaints  The patient has a past medical and chronic pain history as outlined in the assessment section  She was last seen on March 1, 2023  At today's office visit, the patient's pain score is 7 out of 10 on the verbal numerical pain rating scale  The patient states that her pain is worse in the morning, evening, and night  Her pain is constant in nature  She reports the quality of her pain is sharp and shooting    She states that her symptoms are primarily in the left hip region  She would like to undergo an interventional pain procedure today to help with her pain  She believes that she has signs and symptoms consistent with left iliopsoas bursitis  Other than as stated above, the patient denies any interval changes in medications, medical condition, mental condition, symptoms, or allergies since the last office visit  Review of Systems:    Review of Systems   Constitutional: Negative for unexpected weight change  HENT: Negative for ear pain  Eyes: Negative for visual disturbance  Respiratory: Negative for shortness of breath and wheezing  Gastrointestinal: Negative for abdominal pain  Musculoskeletal: Positive for gait problem  Negative for back pain  Groin pain left side, muscle pain    Neurological: Positive for weakness  Negative for numbness  Psychiatric/Behavioral: Positive for sleep disturbance  Negative for decreased concentration  The patient is nervous/anxious  Past Medical History:   Diagnosis Date    Chronic hip pain, left        Past Surgical History:   Procedure Laterality Date    ANKLE ARTHROPLASTY      BREAST LUMPECTOMY      BREAST SURGERY Bilateral     Enlargement    EPIDURAL BLOCK INJECTION Left 3/5/2021    Procedure: left L5 transforaminal epidural steroid injection ( [id number]); Surgeon: Danita Can MD;  Location: [facility] OR;  Service: Pain Management     FL INJECTION LEFT ELBOW (NON ARTHROGRAM)  4/7/2022    LAPAROSCOPY FOR ECTOPIC PREGNANCY      And Salpingectomy    ORTHOPEDIC SURGERY  9/2018    [place] INJECT SI JOINT ARTHRGRPHY&/ANES/STEROID W/OKSANA Left 4/7/2022    Procedure: SACROILIAC joint injection ([id number] ); Surgeon: Danita Can MD;  Location: [facility] OR;  Service: Pain Management     STEROID INJECTION HIP Left 4/16/2021    Procedure: Sacroiliac Joint Injection (41393);   Surgeon: Danita Can MD;  Location: [facility] OR;  Service: Pain Management     TOTAL HIP ARTHROPLASTY Left 09/2018       Family History   Problem Relation Age of Onset   Abdullahi Casey Breast cancer Mother     Arthritis Mother     Emphysema Mother     COPD Mother     Alcohol abuse Father         He has been clean for 2 years    No Known Problems Sister     No Known Problems Brother     No Known Problems Maternal Grandmother     No Known Problems Maternal Grandfather     No Known Problems Paternal Grandmother     No Known Problems Paternal Grandfather     Breast cancer Maternal Aunt     Cancer Maternal Aunt     No Known Problems Maternal Uncle     No Known Problems Paternal Aunt     No Known Problems Paternal Uncle        Social History     Occupational History    Not on file   Tobacco Use    Smoking status: Never    Smokeless tobacco: Never    Tobacco comments:     Former smoker, per allscripts   Vaping Use    Vaping Use: Never used   Substance and Sexual Activity    Alcohol use: No     Comment: social drinker, per allscripts    Drug use: No    Sexual activity: Not Currently     Partners: Male     Birth control/protection: Post-menopausal         Current Outpatient Medications:     gabapentin (NEURONTIN) 300 mg capsule, take 1 capsule by mouth three times a day, Disp: 90 capsule, Rfl: 0    [START ON 5/10/2023] HYDROcodone-acetaminophen (Marshia Ra) 7 5-325 mg per tablet, Take 1 tablet by mouth 3 (three) times a day as needed for pain Max Daily Amount: 3 tablets Do not start before May 10, 2023 , Disp: 90 tablet, Rfl: 0    [START ON 6/9/2023] HYDROcodone-acetaminophen (Marshia Ra) 7 5-325 mg per tablet, Take 1 tablet by mouth 3 (three) times a day as needed for pain Max Daily Amount: 3 tablets Do not start before June 9, 2023 , Disp: 90 tablet, Rfl: 0    meloxicam (MOBIC) 15 mg tablet, Take 1 tablet (15 mg total) by mouth daily Take with food, Disp: 30 tablet, Rfl: 2    ondansetron (Zofran ODT) 4 mg disintegrating tablet, Take 1 tablet (4 mg total) by mouth every 6 (six) hours as needed for nausea or vomiting (Patient not taking: Reported on 3/1/2023), Disp: 10 tablet, Rfl: 0    Allergies   Allergen Reactions    Peach [Prunus Persica] Other (See Comments)     Closure of throat with peaches, apricots,plums , nectarine ,and almonds       Physical Exam:    /75   Pulse 78   Temp 98 2 °F (36 8 °C)   Wt 80 7 kg (178 lb)   BMI 32 56 kg/m²     Constitutional:obese  Eyes:anicteric  HEENT:grossly intact  Neck:supple, symmetric, trachea midline and no masses   Pulmonary:even and unlabored  Cardiovascular:No edema or pitting edema present  Skin:Normal without rashes or lesions and well hydrated  Psychiatric:Mood and affect appropriate  Neurologic:Cranial Nerves II-XII grossly intact  Musculoskeletal:antalgic

## 2023-05-05 ENCOUNTER — OFFICE VISIT (OUTPATIENT)
Dept: OBGYN CLINIC | Facility: CLINIC | Age: 53
End: 2023-05-05

## 2023-05-05 VITALS
HEART RATE: 77 BPM | WEIGHT: 176.8 LBS | BODY MASS INDEX: 32.54 KG/M2 | DIASTOLIC BLOOD PRESSURE: 84 MMHG | HEIGHT: 62 IN | SYSTOLIC BLOOD PRESSURE: 136 MMHG

## 2023-05-05 DIAGNOSIS — Z12.11 ENCOUNTER FOR SCREENING COLONOSCOPY: ICD-10-CM

## 2023-05-05 DIAGNOSIS — M65.312 TRIGGER THUMB OF LEFT HAND: Primary | ICD-10-CM

## 2023-05-05 RX ORDER — BUPIVACAINE HYDROCHLORIDE 2.5 MG/ML
1 INJECTION, SOLUTION EPIDURAL; INFILTRATION; INTRACAUDAL
Status: COMPLETED | OUTPATIENT
Start: 2023-05-05 | End: 2023-05-05

## 2023-05-05 RX ORDER — BETAMETHASONE SODIUM PHOSPHATE AND BETAMETHASONE ACETATE 3; 3 MG/ML; MG/ML
6 INJECTION, SUSPENSION INTRA-ARTICULAR; INTRALESIONAL; INTRAMUSCULAR; SOFT TISSUE
Status: COMPLETED | OUTPATIENT
Start: 2023-05-05 | End: 2023-05-05

## 2023-05-05 RX ADMIN — BETAMETHASONE SODIUM PHOSPHATE AND BETAMETHASONE ACETATE 6 MG: 3; 3 INJECTION, SUSPENSION INTRA-ARTICULAR; INTRALESIONAL; INTRAMUSCULAR; SOFT TISSUE at 08:31

## 2023-05-05 RX ADMIN — BUPIVACAINE HYDROCHLORIDE 1 ML: 2.5 INJECTION, SOLUTION EPIDURAL; INFILTRATION; INTRACAUDAL at 08:31

## 2023-05-05 NOTE — PROGRESS NOTES
ASSESSMENT/PLAN:    Assessment:   Trigger Finger  left  thumb    Plan:   Pt with improved but not resolved trigger finger symptoms after 1st injection  After discussion of options, repeat injx was performed today  Patient tolerated the procedure well  Of note, pt going to [place] later this month  NOTE:  Per [facility] protocol, there was a notification on the patient's chart for a screening colonoscopy  The medical assistant inquire about the patient's interest in receiving a prescription and the patient accepted  The MA placed input the diagnosis code and order  I am not able to remove the diagnosis or the order  I, personally, did not screen the patient for a colonoscopy as this is outside the scope of my practice  Follow Up:  8  week(s)    To Do Next Visit:  Reevaluation      _____________________________________________________  CHIEF COMPLAINT:  Chief Complaint   Patient presents with    Follow-up     Patient is here to f/u on left trigger thumb  Injection did not help at last visit, would like to try a second  SUBJECTIVE:  Osvaldo Duff is a 47 y o  female who presents for follow up regarding Trigger Finger  left  thumb  Since last visit, Osvaldo Duff has tried steroid injections with some but not full relief  Today there is Catching/clicking to the left thumb with continued discomfort  Radiation: None  Handedness: left    PAST MEDICAL HISTORY:  Past Medical History:   Diagnosis Date    Chronic hip pain, left        PAST SURGICAL HISTORY:  Past Surgical History:   Procedure Laterality Date    ANKLE ARTHROPLASTY      BREAST LUMPECTOMY      BREAST SURGERY Bilateral     Enlargement    EPIDURAL BLOCK INJECTION Left 3/5/2021    Procedure: left L5 transforaminal epidural steroid injection ( 43456);   Surgeon: Trav Cantrell MD;  Location: [facility] MAIN OR;  Service: Pain Management     FL INJECTION LEFT ELBOW (NON ARTHROGRAM)  4/7/2022    LAPAROSCOPY FOR ECTOPIC PREGNANCY      And Salpingectomy    ORTHOPEDIC SURGERY  9/2018    [place] INJECT SI JOINT ARTHRGRPHY&/ANES/STEROID W/OKSANA Left 4/7/2022    Procedure: SACROILIAC joint injection (47127 ); Surgeon: Leonor Kessler MD;  Location: [facility] MAIN OR;  Service: Pain Management     STEROID INJECTION HIP Left 4/16/2021    Procedure: Sacroiliac Joint Injection (49732);   Surgeon: Leonor Kessler MD;  Location: [facility] MAIN OR;  Service: Pain Management     TOTAL HIP ARTHROPLASTY Left 09/2018       FAMILY HISTORY:  Family History   Problem Relation Age of Onset    Breast cancer Mother     Arthritis Mother     Emphysema Mother     COPD Mother     Alcohol abuse Father         He has been clean for 2 years    No Known Problems Sister     No Known Problems Brother     No Known Problems Maternal Grandmother     No Known Problems Maternal Grandfather     No Known Problems Paternal Grandmother     No Known Problems Paternal Grandfather     Breast cancer Maternal Aunt     Cancer Maternal Aunt     No Known Problems Maternal Uncle     No Known Problems Paternal Aunt     No Known Problems Paternal Uncle        SOCIAL HISTORY:  Social History     Tobacco Use    Smoking status: Never    Smokeless tobacco: Never    Tobacco comments:     Former smoker, per allscripts   Vaping Use    Vaping Use: Never used   Substance Use Topics    Alcohol use: No     Comment: social drinker, per allscripts    Drug use: No       MEDICATIONS:    Current Outpatient Medications:     gabapentin (NEURONTIN) 300 mg capsule, Take 1 capsule (300 mg total) by mouth 3 (three) times a day, Disp: 90 capsule, Rfl: 0    [START ON 5/10/2023] HYDROcodone-acetaminophen (NORCO) 7 5-325 mg per tablet, Take 1 tablet by mouth 3 (three) times a day as needed for pain Max Daily Amount: 3 tablets Do not start before May 10, 2023 , Disp: 90 tablet, Rfl: 0    [START ON 6/9/2023] HYDROcodone-acetaminophen (NORCO) 7 5-325 mg per tablet, Take 1 tablet by mouth 3 (three) times a day "as needed for pain Max Daily Amount: 3 tablets Do not start before June 9, 2023 , Disp: 90 tablet, Rfl: 0    meloxicam (MOBIC) 15 mg tablet, Take 1 tablet (15 mg total) by mouth daily Take with food, Disp: 30 tablet, Rfl: 2    ondansetron (Zofran ODT) 4 mg disintegrating tablet, Take 1 tablet (4 mg total) by mouth every 6 (six) hours as needed for nausea or vomiting (Patient not taking: Reported on 3/1/2023), Disp: 10 tablet, Rfl: 0    ALLERGIES:  Allergies   Allergen Reactions    Peach [Prunus Persica] Other (See Comments)     Closure of throat with peaches, apricots,plums , nectarine ,and almonds       REVIEW OF SYSTEMS:  Pertinent items are noted in HPI  A comprehensive review of systems was negative  LABS:  HgA1c:   Lab Results   Component Value Date    HGBA1C 5 4 01/18/2021     BMP:   Lab Results   Component Value Date    CALCIUM 8 4 12/17/2021    K 4 3 12/17/2021    CO2 28 12/17/2021     12/17/2021    BUN 15 12/17/2021    CREATININE 0 74 12/17/2021           _____________________________________________________  PHYSICAL EXAMINATION:  Vital signs: /84   Pulse 77   Ht 5' 2\" (1 575 m)   Wt 80 2 kg (176 lb 12 8 oz)   BMI 32 34 kg/m²   General: well developed and well nourished, alert, oriented times 3 and appears comfortable  Psychiatric: Normal  HEENT: Trachea Midline, No torticollis  Cardiovascular: No discernable arrhythmia  Pulmonary: No wheezing or stridor  Abdomen: No rebound or guarding  Extremities: No peripheral edema  Skin: No masses, erythema, lacerations, fluctation, ulcerations  Neurovascular: Sensation Intact to the Median, Ulnar, Radial Nerve, Motor Intact to the Median, Ulnar, Radial Nerve and Pulses Intact    MUSCULOSKELETAL EXAMINATION:  LEFT SIDE:  Finger:  Tenderness A1 pulley thumb, Triggering  thumb and Palpable clicking thumb Full ROM of thumb and fingers       _____________________________________________________  STUDIES REVIEWED:  No Studies to " review      PROCEDURES PERFORMED:  Hand/upper extremity injection: L thumb A1  Universal Protocol:  Consent: Verbal consent obtained    Risks and benefits: risks, benefits and alternatives were discussed  Consent given by: patient  Patient understanding: patient states understanding of the procedure being performed  Patient identity confirmed: verbally with patient    Supporting Documentation  Indications: tendon swelling   Procedure Details  Condition:trigger finger Location: thumb - L thumb A1   Preparation: Patient was prepped and draped in the usual sterile fashion  Needle size: 25 G  Approach: volar  Medications administered: 6 mg betamethasone acetate-betamethasone sodium phosphate 6 (3-3) mg/mL; 1 mL bupivacaine (PF) 0 25 %    Patient tolerance: patient tolerated the procedure well with no immediate complications  Dressing:  Sterile dressing applied

## 2023-05-12 ENCOUNTER — APPOINTMENT (OUTPATIENT)
Dept: RADIOLOGY | Facility: HOSPITAL | Age: 53
End: 2023-05-12

## 2023-05-12 ENCOUNTER — HOSPITAL ENCOUNTER (OUTPATIENT)
Facility: AMBULARY SURGERY CENTER | Age: 53
Setting detail: OUTPATIENT SURGERY
Discharge: HOME | End: 2023-05-12
Attending: ANESTHESIOLOGY | Admitting: ANESTHESIOLOGY

## 2023-05-12 VITALS
OXYGEN SATURATION: 100 % | SYSTOLIC BLOOD PRESSURE: 136 MMHG | TEMPERATURE: 97.9 F | RESPIRATION RATE: 18 BRPM | DIASTOLIC BLOOD PRESSURE: 94 MMHG | HEART RATE: 81 BPM

## 2023-05-12 RX ORDER — BUPIVACAINE HYDROCHLORIDE 2.5 MG/ML
INJECTION, SOLUTION EPIDURAL; INFILTRATION; INTRACAUDAL AS NEEDED
Status: DISCONTINUED | OUTPATIENT
Start: 2023-05-12 | End: 2023-05-12 | Stop reason: HOSPADM

## 2023-05-12 RX ORDER — METHYLPREDNISOLONE ACETATE 80 MG/ML
INJECTION, SUSPENSION INTRA-ARTICULAR; INTRALESIONAL; INTRAMUSCULAR; SOFT TISSUE AS NEEDED
Status: DISCONTINUED | OUTPATIENT
Start: 2023-05-12 | End: 2023-05-12 | Stop reason: HOSPADM

## 2023-05-12 RX ORDER — LIDOCAINE WITH 8.4% SOD BICARB 0.9%(10ML)
SYRINGE (ML) INJECTION AS NEEDED
Status: DISCONTINUED | OUTPATIENT
Start: 2023-05-12 | End: 2023-05-12 | Stop reason: HOSPADM

## 2023-05-12 NOTE — INTERVAL H&P NOTE
H&P reviewed  After examining the patient I find no changes in the patients condition since the H&P had been written      Vitals:    05/12/23 1436   BP: 115/93   Pulse: 94   Resp: 18   Temp: 97 9 °F (36 6 °C)   SpO2: 99%

## 2023-05-12 NOTE — DISCHARGE INSTRUCTIONS
Steroid Joint Injection   WHAT YOU NEED TO KNOW:   A steroid joint injection is a procedure to inject steroid medicine into a joint  Steroid medicine decreases pain and inflammation  The injection may also contain an anesthetic (numbing medicine) to decrease pain  It may be done to treat conditions such as arthritis, gout, or carpal tunnel syndrome  The injections may be given in your knee, ankle, shoulder, elbow, wrist, ankle or sacroiliac joint  Do not apply heat to any area that is numb  If you have discomfort or soreness at the injection site, you may apply ice today, 20 minutes on and 20 minutes off  Tomorrow you may use ice or warm, moist heat  Do not apply ice or heat directly to the skin  You may have an increase or change in the discomfort for 36-48 hours after your treatment  Apply ice and continue with any pain medicine you have been prescribed  Do not do anything strenuous today  You may shower, but no tub baths or hot tubs today  You may resume your normal activities tomorrow, but do not “overdo it”  Resume normal activities slowly when you are feeling better  If you experience redness, drainage or swelling at the injection site, or if you develop a fever above 100 degrees, please call [facility] at [phone] or go to the Emergency Room  Continue to take all routine medicines prescribed by your primary care physician unless otherwise instructed by our staff  Most blood thinners should be started again according to your regularly scheduled dosing  If you have any questions, please give our office a call  As no general anesthesia was used in today's procedure, you should not experience any side effects related to anesthesia  If you are diabetic, the steroids used in today's injection may temporarily increase your blood sugar levels after the first few days after your injection   Please keep a close eye on your sugars and alert the doctor who manages your diabetes if your sugars are significantly high from your baseline or you are symptomatic  If you have a problem specifically related to your procedure, please call our office at [phone]  Problems not related to your procedure should be directed to your primary care physician

## 2023-05-12 NOTE — OP NOTE
ATTENDING PHYSICIAN:  Latoya Clark MD     PROCEDURE: Left iliopsoas bursa injection under fluoroscopic guidance  PREPROCEDURE DIAGNOSIS: Iliopsoas bursitis  POSTPROCEDURE DIAGNOSIS: Iliopsoas bursitis  ANESTHESIA:  Local     ESTIMATED BLOOD LOSS:  Minimal     COMPLICATIONS:  None  LOCATION:  [address]  CONSENT:  Today's procedure, its potential benefits as well as its risks and potential side effects were reviewed  Discussed risks of the procedure including bleeding, infection, nerve irritation or damage, reactions to the medications, failure of the pain to improve, and potential worsening of the pain were explained to the patient who verbalized understanding and who wished to proceed  Written informed consent was thereby obtained  DESCRIPTION OF THE PROCEDURE:  After written informed consent was obtained, the patient was taken to the fluoroscopy suite and placed in the lateral decubitus position  The lesser trochanter was then identified with palpation and confirmed with fluoroscopy  The needle entry site was marked  The skin was prepped with antiseptic swabs in the usual sterile fashion  A 25-gauge 3-1/2-inch spinal needle was advanced until os was contacted at the lesser trochanter  The needle was withdrawn 1 mm and an injectate consisting of 4 mL of 0 25% bupivacaine mixed with 1 mL of 40 mg/mL Depo-Medrol was slowly injected after negative aspiration  The patient tolerated the procedure well and all needles were removed with the tips intact  Hemostasis was maintained  There were no apparent paresthesias or complications  The skin was wiped clean and a Band-Aid was placed as appropriate  The patient was monitored for an appropriate period of time following the procedure and remained hemodynamically stable and neurovascularly intact following the procedure   The patient was ultimately discharged to home with supervision in good condition and instructed to call the office in a few days for an update or sooner as warranted  I was present and participated in all key and critical portions of this procedure      Alexandra García MD  5/12/2023  3:10 PM

## 2023-05-19 ENCOUNTER — TELEPHONE (OUTPATIENT)
Dept: PAIN MEDICINE | Facility: CLINIC | Age: 53
End: 2023-05-19

## 2023-05-19 NOTE — TELEPHONE ENCOUNTER
Pt reports no improvement post inj   Pain level 7-8/10  Pt will be aware for the rest of the month, she will call us if anything.

## 2023-06-26 ENCOUNTER — TELEPHONE (OUTPATIENT)
Dept: PAIN MEDICINE | Facility: CLINIC | Age: 53
End: 2023-06-26

## 2023-06-26 NOTE — TELEPHONE ENCOUNTER
----- Message from Marta Anaya RN sent at 6/26/2023  7:43 AM EDT -----  Regarding: FW: Appointment coming up  Contact: [phone]  Pls re-schd pt for OV   Thank you!  ----- Message -----  From: Hung Sanchez  Sent: 6/25/2023   1:38 PM EDT  To: [facility]  Subject: Appointment coming up                            I am back and need to reschedule my follow-up   I am still in pain it did not work

## 2023-06-28 ENCOUNTER — OFFICE VISIT (OUTPATIENT)
Dept: FAMILY MEDICINE CLINIC | Facility: CLINIC | Age: 53
End: 2023-06-28
Payer: COMMERCIAL

## 2023-06-28 VITALS
SYSTOLIC BLOOD PRESSURE: 112 MMHG | HEART RATE: 78 BPM | TEMPERATURE: 97.7 F | WEIGHT: 177 LBS | DIASTOLIC BLOOD PRESSURE: 78 MMHG | RESPIRATION RATE: 14 BRPM | HEIGHT: 62 IN | OXYGEN SATURATION: 100 % | BODY MASS INDEX: 32.57 KG/M2

## 2023-06-28 DIAGNOSIS — T85.43XA RUPTURE OF IMPLANT OF RIGHT BREAST, INITIAL ENCOUNTER: ICD-10-CM

## 2023-06-28 DIAGNOSIS — Z13.1 SCREENING FOR DIABETES MELLITUS: ICD-10-CM

## 2023-06-28 DIAGNOSIS — Z98.82 HISTORY OF BILATERAL BREAST IMPLANTS: ICD-10-CM

## 2023-06-28 DIAGNOSIS — H04.123 DRY EYES: Primary | ICD-10-CM

## 2023-06-28 DIAGNOSIS — Z13.0 SCREENING FOR DEFICIENCY ANEMIA: ICD-10-CM

## 2023-06-28 DIAGNOSIS — Z12.31 SCREENING MAMMOGRAM FOR BREAST CANCER: ICD-10-CM

## 2023-06-28 DIAGNOSIS — L98.9 LEG SKIN LESION, RIGHT: ICD-10-CM

## 2023-06-28 DIAGNOSIS — Z13.29 SCREENING FOR THYROID DISORDER: ICD-10-CM

## 2023-06-28 DIAGNOSIS — Z13.220 SCREENING FOR LIPID DISORDERS: ICD-10-CM

## 2023-06-28 DIAGNOSIS — Z01.00 ENCOUNTER FOR VISION SCREENING: ICD-10-CM

## 2023-06-28 PROCEDURE — 99214 OFFICE O/P EST MOD 30 MIN: CPT | Performed by: FAMILY MEDICINE

## 2023-06-29 ENCOUNTER — APPOINTMENT (OUTPATIENT)
Dept: RADIOLOGY | Facility: CLINIC | Age: 53
End: 2023-06-29
Payer: COMMERCIAL

## 2023-06-29 ENCOUNTER — OFFICE VISIT (OUTPATIENT)
Dept: OBGYN CLINIC | Facility: CLINIC | Age: 53
End: 2023-06-29
Payer: COMMERCIAL

## 2023-06-29 VITALS
SYSTOLIC BLOOD PRESSURE: 132 MMHG | DIASTOLIC BLOOD PRESSURE: 93 MMHG | HEART RATE: 76 BPM | WEIGHT: 177 LBS | HEIGHT: 62 IN | BODY MASS INDEX: 32.57 KG/M2

## 2023-06-29 DIAGNOSIS — M17.12 PRIMARY OSTEOARTHRITIS OF LEFT KNEE: ICD-10-CM

## 2023-06-29 DIAGNOSIS — M25.562 CHRONIC PAIN OF LEFT KNEE: ICD-10-CM

## 2023-06-29 DIAGNOSIS — M17.12 PRIMARY OSTEOARTHRITIS OF LEFT KNEE: Primary | ICD-10-CM

## 2023-06-29 DIAGNOSIS — M25.462 EFFUSION OF LEFT KNEE: ICD-10-CM

## 2023-06-29 DIAGNOSIS — Z96.642 HISTORY OF TOTAL HIP ARTHROPLASTY, LEFT: ICD-10-CM

## 2023-06-29 DIAGNOSIS — R10.32 LEFT GROIN PAIN: ICD-10-CM

## 2023-06-29 DIAGNOSIS — G89.29 CHRONIC PAIN OF LEFT KNEE: ICD-10-CM

## 2023-06-29 DIAGNOSIS — Z01.89 ENCOUNTER FOR OTHER SPECIFIED SPECIAL EXAMINATIONS: ICD-10-CM

## 2023-06-29 PROCEDURE — 73560 X-RAY EXAM OF KNEE 1 OR 2: CPT

## 2023-06-29 PROCEDURE — 20610 DRAIN/INJ JOINT/BURSA W/O US: CPT | Performed by: ORTHOPAEDIC SURGERY

## 2023-06-29 PROCEDURE — 73562 X-RAY EXAM OF KNEE 3: CPT

## 2023-06-29 PROCEDURE — 99214 OFFICE O/P EST MOD 30 MIN: CPT | Performed by: ORTHOPAEDIC SURGERY

## 2023-06-29 RX ORDER — TRIAMCINOLONE ACETONIDE 40 MG/ML
80 INJECTION, SUSPENSION INTRA-ARTICULAR; INTRAMUSCULAR
Status: COMPLETED | OUTPATIENT
Start: 2023-06-29 | End: 2023-06-29

## 2023-06-29 RX ORDER — BUPIVACAINE HYDROCHLORIDE 7.5 MG/ML
2 INJECTION, SOLUTION EPIDURAL; RETROBULBAR
Status: COMPLETED | OUTPATIENT
Start: 2023-06-29 | End: 2023-06-29

## 2023-06-29 RX ADMIN — BUPIVACAINE HYDROCHLORIDE 2 ML: 7.5 INJECTION, SOLUTION EPIDURAL; RETROBULBAR at 13:45

## 2023-06-29 RX ADMIN — TRIAMCINOLONE ACETONIDE 80 MG: 40 INJECTION, SUSPENSION INTRA-ARTICULAR; INTRAMUSCULAR at 13:45

## 2023-06-29 NOTE — PROGRESS NOTES
"Assessment/Plan:  1  Primary osteoarthritis of left knee  XR knee 3 vw left non injury      2  Chronic pain of left knee  XR knee 3 vw left non injury      3  Effusion of left knee        4  Left groin pain  MRI hip left wo contrast      5  History of total hip arthroplasty, left  MRI hip left wo contrast        Scribe Attestation    I,:  Arianna Caro am acting as a scribe while in the presence of the attending physician :       I,:  Paloma Durand, DO personally performed the services described in this documentation    as scribed in my presence :         Seble Porter is a pleasant 25-year-old female who returns today for follow-up evaluation of her left knee pain  She is still receiving some relief from corticosteroid injection therapy  I offered to repeat this for her today  She consented to and underwent left knee aspiration and injection as documented below without issue or complication  This was well-tolerated by the patient  Postinjection instructions were provided  She understands this can be repeated no sooner than 3 months  I have also ordered an MRI of the left hip to evaluate for iliopsoas tendinopathy  This should be performed with metal suppression due to her retained orthopedic hardware from prior total hip arthroplasty  I will reach out to her after the study is completed to review the results and further delineate her plan of care  Large joint arthrocentesis: L knee  Universal Protocol:  Consent: Verbal consent obtained  Consent given by: patient  Time out: Immediately prior to procedure a \"time out\" was called to verify the correct patient, procedure, equipment, support staff and site/side marked as required    Patient understanding: patient states understanding of the procedure being performed  Site marked: the operative site was marked  Patient identity confirmed: verbally with patient    Supporting Documentation  Indications: pain and joint swelling   Procedure Details  Location: knee - " L knee  Preparation: Patient was prepped and draped in the usual sterile fashion  Needle size: 18 G  Ultrasound guidance: no  Approach: superior lateral   Medications administered: 80 mg triamcinolone acetonide 40 mg/mL; 2 mL bupivacaine (PF) 0 75 %    Aspirate amount: 10 mL    Patient tolerance: patient tolerated the procedure well with no immediate complications  Dressing:  Sterile dressing applied            Subjective: Follow-up evaluation for left knee pain    Patient ID: Ovi Acosta is a 47 y o  female who returns today for follow-up evaluation of her left knee pain  She was last seen in June 2022 and received a corticosteroid injection at that time  At today's visit, she reports that this provided 40 to 50% relief of her pain  She complains of activity related pain in her left knee over the last 9 months or so  She describes aching pain about the lateral and anterolateral aspect of her knee that is worse with activity  Her symptoms are especially pronounced when kneeling  She also complains of persistent pain into her groin that can radiate into her upper leg  She has been receiving injection therapy with Dr Nanda Mitchell including SI joint injection and iliopsoas bursa injection  These have been ineffective for her  She denies any new injury or trauma  Review of Systems   Constitutional: Positive for activity change  Negative for chills, fever and unexpected weight change  HENT: Negative for hearing loss, nosebleeds and sore throat  Eyes: Negative for pain, redness and visual disturbance  Respiratory: Negative for cough, shortness of breath and wheezing  Cardiovascular: Negative for chest pain, palpitations and leg swelling  Gastrointestinal: Negative for abdominal pain, nausea and vomiting  Endocrine: Negative for polydipsia and polyuria  Genitourinary: Negative for dysuria and hematuria  Musculoskeletal: Positive for arthralgias and myalgias  Negative for joint swelling  See HPI   Skin: Negative for rash and wound  Neurological: Negative for dizziness, numbness and headaches  Psychiatric/Behavioral: Negative for decreased concentration and suicidal ideas  The patient is not nervous/anxious  Past Medical History:   Diagnosis Date   • Chronic hip pain, left        Past Surgical History:   Procedure Laterality Date   • ANKLE ARTHROPLASTY     • ANKLE SURGERY     • BREAST LUMPECTOMY     • BREAST SURGERY Bilateral     Enlargement   • EPIDURAL BLOCK INJECTION Left 03/05/2021    Procedure: left L5 transforaminal epidural steroid injection ( [id number]); Surgeon: Eun Boland MD;  Location: [facility] MAIN OR;  Service: Pain Management    • FL INJECTION LEFT ELBOW (NON ARTHROGRAM)  04/07/2022   • LAPAROSCOPY FOR ECTOPIC PREGNANCY      And Salpingectomy   • ORTHOPEDIC SURGERY  09/2018   • IA ARTHROCENTESIS ASPIR&/INJ MAJOR JT/BURSA W/O US Left 05/12/2023    Procedure: Iliopsoas bursitis of left hip  (86504 [id number]); Surgeon: Eun Boland MD;  Location: [facility] MAIN OR;  Service: Pain Management    • IA INJECT SI JOINT ARTHRGRPHY&/ANES/STEROID W/OKSANA Left 04/07/2022    Procedure: SACROILIAC joint injection ([id number] ); Surgeon: Eun Boland MD;  Location: [facility] MAIN OR;  Service: Pain Management    • STEROID INJECTION HIP Left 04/16/2021    Procedure: Sacroiliac Joint Injection ([id number]);   Surgeon: Eun Boland MD;  Location: [facility] OR;  Service: Pain Management    • TOTAL HIP ARTHROPLASTY Left 09/2018       Family History   Problem Relation Age of Onset   • Breast cancer Mother    • Arthritis Mother    • Emphysema Mother    • COPD Mother    • Alcohol abuse Father         He has been clean for 2 years   • No Known Problems Sister    • No Known Problems Brother    • No Known Problems Maternal Grandmother    • No Known Problems Maternal Grandfather    • No Known Problems Paternal Grandmother    • No Known Problems Paternal Grandfather    • Breast cancer Maternal Aunt    • Cancer Maternal Aunt    • No Known Problems Maternal Uncle    • No Known Problems Paternal Aunt    • No Known Problems Paternal Uncle        Social History     Occupational History   • Not on file   Tobacco Use   • Smoking status: Never   • Smokeless tobacco: Never   • Tobacco comments:     Former smoker, per allscripts   Vaping Use   • Vaping Use: Never used   Substance and Sexual Activity   • Alcohol use: No     Comment: social drinker, per allscripts   • Drug use: No   • Sexual activity: Not Currently     Partners: Male     Birth control/protection: Post-menopausal         Current Outpatient Medications:   •  gabapentin (NEURONTIN) 300 mg capsule, Take 1 capsule (300 mg total) by mouth 3 (three) times a day, Disp: 90 capsule, Rfl: 0  •  HYDROcodone-acetaminophen (NORCO) 7 5-325 mg per tablet, Take 1 tablet by mouth 3 (three) times a day as needed for pain Max Daily Amount: 3 tablets Do not start before June 9, 2023 , Disp: 90 tablet, Rfl: 0  •  meloxicam (MOBIC) 15 mg tablet, Take 1 tablet (15 mg total) by mouth daily Take with food, Disp: 30 tablet, Rfl: 2    Allergies   Allergen Reactions   • Peach [Prunus Persica] Other (See Comments)     Closure of throat with peaches, apricots,plums , nectarine ,and almonds       Objective:  Vitals:    06/29/23 1329   BP: 132/93   Pulse: 76       Body mass index is 32 37 kg/m²  Left Knee Exam     Tenderness   The patient is experiencing tenderness in the medial joint line, patella and lateral joint line      Range of Motion   Extension: 0   Flexion: 120     Tests   Varus: negative Valgus: negative    Other   Erythema: absent  Scars: absent  Sensation: normal  Pulse: present  Swelling: none  Effusion: no effusion present    Comments:  No erythema, warmth or signs of infections  Parapatellar crepitance  Patellofemoral grind: positive  Knee is stable on ligamentous exam at 0, 30, 90 degrees  Neurovascularly intact       Left Hip Exam     Tenderness   The patient is experiencing tenderness in the anterior  Muscle Strength   Flexion: 5/5     Tests   STEVEN: positive    Other   Erythema: absent  Scars: present  Sensation: normal  Pulse: present    Comments:  Pain with IR/ER  Stinchfield: positive  FADIR: positive          Observations   Left Knee   Negative for effusion  Physical Exam  Vitals and nursing note reviewed  Constitutional:       Appearance: Normal appearance  She is well-developed  HENT:      Head: Normocephalic and atraumatic  Right Ear: External ear normal       Left Ear: External ear normal    Eyes:      General: No scleral icterus  Extraocular Movements: Extraocular movements intact  Conjunctiva/sclera: Conjunctivae normal    Cardiovascular:      Rate and Rhythm: Normal rate  Pulmonary:      Effort: Pulmonary effort is normal  No respiratory distress  Musculoskeletal:      Cervical back: Normal range of motion and neck supple  Left knee: No effusion  Comments: See Ortho exam   Skin:     General: Skin is warm and dry  Neurological:      Mental Status: She is alert and oriented to person, place, and time  Psychiatric:         Behavior: Behavior normal          I have personally reviewed pertinent films in PACS  X-ray of the left knee obtained on 6/29/2023 reviewed demonstrating moderate degenerative change in a valgus pattern with narrowing laterally and in the patellofemoral compartment  There is tricompartmental sclerosis and osteophytosis  There is no acute fracture, dislocation, lytic or blastic lesion  X-ray of the left hip obtained on 3/24/2023 reviewed demonstrating a total hip arthroplasty in acceptable positioning and alignment without evidence of failure  There is no acute fracture, dislocation, lytic or blastic lesion  This document was created using speech voice recognition software     Grammatical errors, random word insertions, pronoun errors, and incomplete sentences are an occasional consequence of this system due to software limitations, ambient noise, and hardware issues  Any formal questions or concerns about content, text, or information contained within the body of this dictation should be directly addressed to the provider for clarification

## 2023-06-30 ENCOUNTER — OFFICE VISIT (OUTPATIENT)
Dept: OBGYN CLINIC | Facility: CLINIC | Age: 53
End: 2023-06-30
Payer: COMMERCIAL

## 2023-06-30 VITALS
WEIGHT: 176 LBS | HEART RATE: 107 BPM | SYSTOLIC BLOOD PRESSURE: 147 MMHG | BODY MASS INDEX: 32.19 KG/M2 | DIASTOLIC BLOOD PRESSURE: 81 MMHG | TEMPERATURE: 98.2 F

## 2023-06-30 DIAGNOSIS — M65.312 TRIGGER THUMB OF LEFT HAND: Primary | ICD-10-CM

## 2023-06-30 PROCEDURE — 99213 OFFICE O/P EST LOW 20 MIN: CPT | Performed by: ORTHOPAEDIC SURGERY

## 2023-06-30 PROCEDURE — 20550 NJX 1 TENDON SHEATH/LIGAMENT: CPT | Performed by: ORTHOPAEDIC SURGERY

## 2023-06-30 RX ORDER — BETAMETHASONE SODIUM PHOSPHATE AND BETAMETHASONE ACETATE 3; 3 MG/ML; MG/ML
6 INJECTION, SUSPENSION INTRA-ARTICULAR; INTRALESIONAL; INTRAMUSCULAR; SOFT TISSUE
Status: COMPLETED | OUTPATIENT
Start: 2023-06-30 | End: 2023-06-30

## 2023-06-30 RX ADMIN — BETAMETHASONE SODIUM PHOSPHATE AND BETAMETHASONE ACETATE 6 MG: 3; 3 INJECTION, SUSPENSION INTRA-ARTICULAR; INTRALESIONAL; INTRAMUSCULAR; SOFT TISSUE at 08:30

## 2023-06-30 NOTE — PROGRESS NOTES
Assessment/Plan:    1  Dry eyes  -     THALIA Screen w/ Reflex to Titer/Pattern; Future  -     Ambulatory Referral to Ophthalmology; Future    2  Rupture of implant of right breast, initial encounter    3  History of bilateral breast implants  -     Mammo screening bilateral w 3d & cad; Future; Expected date: 06/28/2023  -      breast screening bilateral complete (ABUS); Future; Expected date: 06/28/2023    4  Screening mammogram for breast cancer  -     Mammo screening bilateral w 3d & cad; Future; Expected date: 06/28/2023  -      breast screening bilateral complete (ABUS); Future; Expected date: 06/28/2023    5  Screening for diabetes mellitus  -     Comprehensive metabolic panel; Future  -     Hemoglobin A1C; Future  -     Comprehensive metabolic panel  -     Hemoglobin A1C    6  Screening for deficiency anemia  -     CBC; Future  -     CBC    7  Screening for lipid disorders  -     Lipase; Future  -     Comprehensive metabolic panel; Future  -     Lipid Panel with Direct LDL reflex; Future  -     Magnesium; Future  -     TSH, 3rd generation; Future  -     Lipase  -     Comprehensive metabolic panel  -     Lipid Panel with Direct LDL reflex  -     Magnesium  -     TSH, 3rd generation    8  Screening for thyroid disorder  -     Magnesium; Future  -     TSH, 3rd generation; Future  -     Magnesium  -     TSH, 3rd generation    9  Encounter for vision screening  -     Ambulatory Referral to Ophthalmology; Future    10  Leg skin lesion, right  -     Ambulatory Referral to Dermatology; Future    11  BMI 32 0-32 9,adult      BMI Counseling: Body mass index is 32 37 kg/m²  The BMI is above normal  Nutrition recommendations include encouraging healthy choices of fruits and vegetables, consuming healthier snacks, moderation in carbohydrate intake, increasing intake of lean protein, reducing intake of saturated and trans fat and reducing intake of cholesterol   Exercise recommendations include exercising 3-5 times per week and strength training exercises  No pharmacotherapy was ordered  Rationale for BMI follow-up plan is due to patient being overweight or obese  Subjective:      Patient ID: Rony Craig is a 47 y o  female  Chief Complaint   Patient presents with   • Medication Problem     Pt would like to discuss medication's would like to start medical marijuana   • Breast Problem     Pt states has breast implant rt side doesn't look right       HPI    multiple concerns  Follows w specialist for chronic pain-would like to address possible use of medical marijuana  Feels current meds not as effective as in past  c/o dry eyes, +wgt gain w/o any sig change in diet or activity  Also thinks she may have ruptured right breast implant  Reports saline implants since 2009--had procedure done in [place]      The following portions of the patient's history were reviewed and updated as appropriate: allergies, current medications, past family history, past medical history, past social history, past surgical history and problem list   Past Medical History:   Diagnosis Date   • Allergic    • Anxiety    • Chronic hip pain, left    • GERD (gastroesophageal reflux disease)    • Visual impairment      Past Surgical History:   Procedure Laterality Date   • ANKLE ARTHROPLASTY     • ANKLE SURGERY     • BREAST LUMPECTOMY     • BREAST SURGERY Bilateral     Enlargement   • EPIDURAL BLOCK INJECTION Left 03/05/2021    Procedure: left L5 transforaminal epidural steroid injection ( [id number]); Surgeon: Jyotsna Ruano MD;  Location: [facility] MAIN OR;  Service: Pain Management    • FL INJECTION LEFT ELBOW (NON ARTHROGRAM)  04/07/2022   • LAPAROSCOPY FOR ECTOPIC PREGNANCY      And Salpingectomy   • ORTHOPEDIC SURGERY  09/2018   • ME ARTHROCENTESIS ASPIR&/INJ MAJOR JT/BURSA W/O US Left 05/12/2023    Procedure: Iliopsoas bursitis of left hip  ([id number]);   Surgeon: Jyotsna Ruano MD;  Location: [facility] MAIN OR;  Service: Pain Management    • ME INJECT SI "JOINT ARTHRGRPHY&/ANES/STEROID W/OKSANA Left 04/07/2022    Procedure: SACROILIAC joint injection ([id number] ); Surgeon: Rigo Bridges MD;  Location: [facility] MAIN OR;  Service: Pain Management    • STEROID INJECTION HIP Left 04/16/2021    Procedure: Sacroiliac Joint Injection ([id number]); Surgeon: Rigo Bridges MD;  Location: [facility] MAIN OR;  Service: Pain Management    • TOTAL HIP ARTHROPLASTY Left 09/2018     Review of Systems   Constitutional: Positive for fatigue and unexpected weight change  Eyes:        Dry eyes   Respiratory: Negative  Cardiovascular: Negative  Gastrointestinal:        Gerd   Musculoskeletal: Positive for arthralgias  Psychiatric/Behavioral: Positive for sleep disturbance  Current Outpatient Medications   Medication Sig Dispense Refill   • gabapentin (NEURONTIN) 300 mg capsule Take 1 capsule (300 mg total) by mouth 3 (three) times a day 90 capsule 0   • HYDROcodone-acetaminophen (NORCO) 7 5-325 mg per tablet Take 1 tablet by mouth 3 (three) times a day as needed for pain Max Daily Amount: 3 tablets Do not start before June 9, 2023  90 tablet 0   • meloxicam (MOBIC) 15 mg tablet Take 1 tablet (15 mg total) by mouth daily Take with food 30 tablet 2     No current facility-administered medications for this visit  Objective:    /78 (BP Location: Left arm, Patient Position: Sitting, Cuff Size: Adult)   Pulse 78   Temp 97 7 °F (36 5 °C) (Temporal)   Resp 14   Ht 5' 2\" (1 575 m)   Wt 80 3 kg (177 lb)   SpO2 100%   BMI 32 37 kg/m²        Physical Exam  Vitals reviewed  Constitutional:       General: She is not in acute distress  Comments: OW   Cardiovascular:      Rate and Rhythm: Normal rate and regular rhythm  Pulmonary:      Effort: Pulmonary effort is normal  No respiratory distress  Abdominal:      General: Bowel sounds are normal       Palpations: Abdomen is soft  Tenderness: There is no abdominal tenderness     Genitourinary:     Comments: Breast " exam:   No SC or axillary LAD  B/l breast implants--?asymmetry on right side  No nipple d/c  Musculoskeletal:      Cervical back: Neck supple  Skin:     General: Skin is warm and dry  Findings: Lesion (pigmented raised lesion right lower leg) present  Neurological:      Mental Status: She is alert and oriented to person, place, and time  Cranial Nerves: No cranial nerve deficit             Pepe Munguia MD

## 2023-06-30 NOTE — PROGRESS NOTES
Assessment/Plan:  1  Trigger thumb of left hand            • Treatment options discussed which include nonoperative and operative management  We discussed use of splinting, therapy, activity modification, use of NSAIDs (oral and topical), and injections  We discussed risks and benefits of each and well as expected reasonable outcomes  Surgery can be considered following unsuccessful treatment with nonoperative management  • Discussed the surgical procedure with the patient  • The patient was given the opportunity to ask questions  Questions were answered to the patient's satisfaction  • The patient decided to move forward with a 3rd injection via shared decision making  She understood that this would be the final injection  • Follow up in 8 weeks for clinical examination and to discuss surgery for left trigger thumb release if symptoms recur    Hand/upper extremity injection: L thumb A1  Universal Protocol:  Consent: Verbal consent obtained  Risks and benefits: risks, benefits and alternatives were discussed  Consent given by: patient    Supporting Documentation  Indications: tendon swelling and therapeutic   Procedure Details  Condition:trigger finger Location: thumb - L thumb A1   Preparation: Patient was prepped and draped in the usual sterile fashion  Needle size: 25 G  Approach: volar  Medications administered: 6 mg betamethasone acetate-betamethasone sodium phosphate 6 (3-3) mg/mL             CC: My thumb is triggering again    Subjective:   Valeria Segundo is a right hand dominant 47 y o  female who presents follow-up for left trigger thumb  She reported that the thumb started triggering again recently  She stated she was interested in having another injection  She recently returned from [place]  She had a wonderful trip              Past Medical History:   Diagnosis Date   • Allergic    • Anxiety    • Chronic hip pain, left    • GERD (gastroesophageal reflux disease)    • Visual impairment Past Surgical History:   Procedure Laterality Date   • ANKLE ARTHROPLASTY     • ANKLE SURGERY     • BREAST LUMPECTOMY     • BREAST SURGERY Bilateral     Enlargement   • EPIDURAL BLOCK INJECTION Left 03/05/2021    Procedure: left L5 transforaminal epidural steroid injection ( [id number]); Surgeon: Dino Post MD;  Location: [facility] MAIN OR;  Service: Pain Management    • FL INJECTION LEFT ELBOW (NON ARTHROGRAM)  04/07/2022   • LAPAROSCOPY FOR ECTOPIC PREGNANCY      And Salpingectomy   • ORTHOPEDIC SURGERY  09/2018   • FL ARTHROCENTESIS ASPIR&/INJ MAJOR JT/BURSA W/O US Left 05/12/2023    Procedure: Iliopsoas bursitis of left hip  (74245 [id number]); Surgeon: Dino Post MD;  Location: [facility] MAIN OR;  Service: Pain Management    • FL INJECT SI JOINT ARTHRGRPHY&/ANES/STEROID W/OKSANA Left 04/07/2022    Procedure: SACROILIAC joint injection ([id number] ); Surgeon: Dino Post MD;  Location: [facility] MAIN OR;  Service: Pain Management    • STEROID INJECTION HIP Left 04/16/2021    Procedure: Sacroiliac Joint Injection ([id number]);   Surgeon: Dino Post MD;  Location: [facility] MAIN OR;  Service: Pain Management    • TOTAL HIP ARTHROPLASTY Left 09/2018       Family History   Problem Relation Age of Onset   • Breast cancer Mother    • Arthritis Mother    • Emphysema Mother    • COPD Mother    • Alcohol abuse Father         He has been clean for 2 years   • No Known Problems Sister    • No Known Problems Brother    • No Known Problems Maternal Grandmother    • No Known Problems Maternal Grandfather    • No Known Problems Paternal Grandmother    • No Known Problems Paternal Grandfather    • Breast cancer Maternal Aunt    • Cancer Maternal Aunt    • No Known Problems Maternal Uncle    • No Known Problems Paternal Aunt    • No Known Problems Paternal Uncle        Social History     Occupational History   • Not on file   Tobacco Use   • Smoking status: Never   • Smokeless tobacco: Never   • Tobacco comments:     Former smoker, per allscripts   Vaping Use   • Vaping Use: Never used   Substance and Sexual Activity   • Alcohol use: No     Comment: social drinker, per allscripts   • Drug use: No   • Sexual activity: Not Currently     Partners: Male     Birth control/protection: Post-menopausal         Current Outpatient Medications:   •  gabapentin (NEURONTIN) 300 mg capsule, Take 1 capsule (300 mg total) by mouth 3 (three) times a day, Disp: 90 capsule, Rfl: 0  •  HYDROcodone-acetaminophen (NORCO) 7 5-325 mg per tablet, Take 1 tablet by mouth 3 (three) times a day as needed for pain Max Daily Amount: 3 tablets Do not start before June 9, 2023 , Disp: 90 tablet, Rfl: 0  •  meloxicam (MOBIC) 15 mg tablet, Take 1 tablet (15 mg total) by mouth daily Take with food, Disp: 30 tablet, Rfl: 2  No current facility-administered medications for this visit  Allergies   Allergen Reactions   • Peach [Prunus Persica] Other (See Comments)     Closure of throat with peaches, apricots,plums , nectarine ,and almonds       Objective:  Vitals:    06/30/23 0836   BP: 147/81   Pulse: (!) 107   Temp: 98 2 °F (36 8 °C)       The patient was awake, alert, and oriented to person, place, and time  No acute distress  Normocephalic  EOMI  Mucous membranes moist   Normal respiratory effort  The thumb finger was not postured in a flexed position  There was tenderness at the level of the A1 pulley  There was crepitus with flexion and extension of the finger  Catching/locking was observed during the examination  This document was created using speech voice recognition software  Grammatical errors, random word insertions, pronoun errors, and incomplete sentences are an occasional consequence of this system due to software limitations, ambient noise, and hardware issues  Any formal questions or concerns about content, text, or information contained within the body of this dictation should be directly addressed to the provider for clarification

## 2023-07-01 LAB
ALBUMIN SERPL-MCNC: 4.7 G/DL (ref 3.8–4.9)
ALBUMIN/GLOB SERPL: 1.8 {RATIO} (ref 1.2–2.2)
ALP SERPL-CCNC: 68 IU/L (ref 44–121)
ALT SERPL-CCNC: 15 IU/L (ref 0–32)
ANA NUCLEAR DOTS TITR SER IF: NORMAL {TITER}
ANA TITR SER IF: POSITIVE {TITER}
AST SERPL-CCNC: 16 IU/L (ref 0–40)
BILIRUB SERPL-MCNC: 0.3 MG/DL (ref 0–1.2)
BUN SERPL-MCNC: 25 MG/DL (ref 6–24)
BUN/CREAT SERPL: 32 (ref 9–23)
CALCIUM SERPL-MCNC: 9.5 MG/DL (ref 8.7–10.2)
CHLORIDE SERPL-SCNC: 102 MMOL/L (ref 96–106)
CHOLEST SERPL-MCNC: 244 MG/DL (ref 100–199)
CO2 SERPL-SCNC: 19 MMOL/L (ref 20–29)
CREAT SERPL-MCNC: 0.77 MG/DL (ref 0.57–1)
EGFR: 93 ML/MIN/1.73
ERYTHROCYTE [DISTWIDTH] IN BLOOD BY AUTOMATED COUNT: 12.1 % (ref 11.7–15.4)
EST. AVERAGE GLUCOSE BLD GHB EST-MCNC: 111 MG/DL
GLOBULIN SER-MCNC: 2.6 G/DL (ref 1.5–4.5)
GLUCOSE SERPL-MCNC: 116 MG/DL (ref 70–99)
HBA1C MFR BLD: 5.5 % (ref 4.8–5.6)
HCT VFR BLD AUTO: 40.3 % (ref 34–46.6)
HDLC SERPL-MCNC: 111 MG/DL
HGB BLD-MCNC: 13.3 G/DL (ref 11.1–15.9)
LDLC SERPL CALC-MCNC: 123 MG/DL (ref 0–99)
LDLC/HDLC SERPL: 1.1 RATIO (ref 0–3.2)
LIPASE SERPL-CCNC: 24 U/L (ref 14–72)
MAGNESIUM SERPL-MCNC: 2.3 MG/DL (ref 1.6–2.3)
MCH RBC QN AUTO: 30.3 PG (ref 26.6–33)
MCHC RBC AUTO-ENTMCNC: 33 G/DL (ref 31.5–35.7)
MCV RBC AUTO: 92 FL (ref 79–97)
MICRODELETION SYND BLD/T FISH: NORMAL
PLATELET # BLD AUTO: 361 X10E3/UL (ref 150–450)
POTASSIUM SERPL-SCNC: 4.9 MMOL/L (ref 3.5–5.2)
PROT SERPL-MCNC: 7.3 G/DL (ref 6–8.5)
RBC # BLD AUTO: 4.39 X10E6/UL (ref 3.77–5.28)
SL AMB NOTE:: NORMAL
SL AMB VLDL CHOLESTEROL CALC: 10 MG/DL (ref 5–40)
SODIUM SERPL-SCNC: 137 MMOL/L (ref 134–144)
TRIGL SERPL-MCNC: 62 MG/DL (ref 0–149)
TSH SERPL DL<=0.005 MIU/L-ACNC: 1.44 UIU/ML (ref 0.45–4.5)
WBC # BLD AUTO: 11.2 X10E3/UL (ref 3.4–10.8)

## 2023-07-03 ENCOUNTER — OFFICE VISIT (OUTPATIENT)
Dept: PAIN MEDICINE | Facility: CLINIC | Age: 53
End: 2023-07-03
Payer: COMMERCIAL

## 2023-07-03 VITALS
DIASTOLIC BLOOD PRESSURE: 82 MMHG | BODY MASS INDEX: 32.31 KG/M2 | HEIGHT: 62 IN | SYSTOLIC BLOOD PRESSURE: 150 MMHG | HEART RATE: 73 BPM | WEIGHT: 175.6 LBS

## 2023-07-03 DIAGNOSIS — G89.4 CHRONIC PAIN SYNDROME: Primary | ICD-10-CM

## 2023-07-03 DIAGNOSIS — Z96.642 S/P HIP REPLACEMENT, LEFT: ICD-10-CM

## 2023-07-03 DIAGNOSIS — F11.20 UNCOMPLICATED OPIOID DEPENDENCE (HCC): ICD-10-CM

## 2023-07-03 DIAGNOSIS — Z79.891 LONG-TERM CURRENT USE OF OPIATE ANALGESIC: ICD-10-CM

## 2023-07-03 DIAGNOSIS — M25.552 PAIN IN LEFT HIP: ICD-10-CM

## 2023-07-03 DIAGNOSIS — M46.1 SACROILIITIS (HCC): ICD-10-CM

## 2023-07-03 PROCEDURE — 99214 OFFICE O/P EST MOD 30 MIN: CPT

## 2023-07-03 RX ORDER — HYDROCODONE BITARTRATE AND ACETAMINOPHEN 7.5; 325 MG/1; MG/1
1 TABLET ORAL 3 TIMES DAILY PRN
Qty: 90 TABLET | Refills: 0 | Status: SHIPPED | OUTPATIENT
Start: 2023-08-08 | End: 2023-09-07

## 2023-07-03 RX ORDER — HYDROCODONE BITARTRATE AND ACETAMINOPHEN 7.5; 325 MG/1; MG/1
1 TABLET ORAL 3 TIMES DAILY PRN
Qty: 90 TABLET | Refills: 0 | Status: SHIPPED | OUTPATIENT
Start: 2023-07-09 | End: 2023-08-08

## 2023-07-03 RX ORDER — GABAPENTIN 300 MG/1
300 CAPSULE ORAL 3 TIMES DAILY
Qty: 90 CAPSULE | Refills: 2 | Status: SHIPPED | OUTPATIENT
Start: 2023-07-03

## 2023-07-03 NOTE — PROGRESS NOTES
Pain Medicine Follow-Up Note    Assessment:  1. Chronic pain syndrome    2. Sacroiliitis ([address])    3. Uncomplicated opioid dependence ([address])    4. Long-term current use of opiate analgesic    5. Pain in left hip    6. S/P hip replacement, left        Plan:  Orders Placed This Encounter   Procedures   • MM ALL_Prescribed Meds and Special Instructions     Order Specific Question:   [facility] Is GABAPENTIN prescribed? Answer:   Yes     Order Specific Question:   [facility] Is HYDROCODONE/APAP prescribed? Answer:   Yes     Order Specific Question:   [facility] Is MELOXICAM prescribed? Answer:    Yes   • MM DT_Alprazolam Definitive Test   • MM DT_Amphetamine Definitive Test   • MM DT_Aripiprazole Definitive Test   • MM DT_Bath Salts Definitive Test   • MM DT_Buprenorphine Definitive Test   • MM DT_Butalbital Definitive Test   • MM DT_Clozapine Definitive Test   • MM DT_Clonazepam Definitive Test   • MM DT_Cocaine Definitive Test   • MM DT_Codeine Definitive Test   • MM DT_Desipramine Definitive Test   • MM DT_Dextromethorphan Definitive Test   • MM Diazepam Definitive Test   • MM DT_Ethyl Glucuronide/Ethyl Sulfate Definitive Test   • MM DT_Fentanyl Definitive Test   • MM DT_Haloperidol Definitive Test   • MM DT_Heroin Definitive Test   • MM DT_Hydrocodone Definitive Test   • MM DT_Hydromorphone Definitive Test   • MM DT_Imipramine Definitive Test   • MM DT_Kratom Definitive Test   • MM DT_Levorphanol Definitive Test   • MM Lorazepam Definitive Test   • MM DT_MDMA Definitive Test   • MM DT_Meperidine Definitive Test   • MM DT_Methadone Definitive Test   • MM DT_Methamphetamine Definitive Test   • MM DT_Methylphenidate Definitive Test   • MM DT_Morphine Definitive Test   • MM DT_Oxazepam Definitive Test   • MM DT_Oxycodone Definitive Test   • MM DT_Oxymorphone Definitive Test   • MM DT_Phenobarbital Definitive Test   • MM DT_Phencyclidine Definitive Test   • MM DT_Phentermine Definitive Test   • MM DT_Secobarbital Definitive Test   • MM DT_Spice Definitive Test   • MM DT_Tapentadol Definitive Test   • MM DT_Temazapam Definitive Test   • MM DT_THC Definitive Test   • MM DT_Tramadol Definitive Test       New Medications Ordered This Visit   Medications   • HYDROcodone-acetaminophen (NORCO) 7.5-325 mg per tablet     Sig: Take 1 tablet by mouth 3 (three) times a day as needed for pain Max Daily Amount: 3 tablets Do not start before August 8, 2023. Dispense:  90 tablet     Refill:  0     Due for fill on 8/8/2023 may fill one day prior   • HYDROcodone-acetaminophen (NORCO) 7.5-325 mg per tablet     Sig: Take 1 tablet by mouth 3 (three) times a day as needed for pain Max Daily Amount: 3 tablets Do not start before July 9, 2023. Dispense:  90 tablet     Refill:  0     Due for fill on 7/9/2023  May fill one day prior   • gabapentin (NEURONTIN) 300 mg capsule     Sig: Take 1 capsule (300 mg total) by mouth 3 (three) times a day     Dispense:  90 capsule     Refill:  2       My impressions and treatment recommendations were discussed in detail with the patient who verbalized understanding and had no further questions. Patient presents the office frustrated since the medications are only taking the edge off. Patient denies significant side effects however she also states questionable efficacy of hydrocodone/acetaminophen 7.5/325 mg tablet. Had conversation with patient regarding medical marijuana. Patient stated that she is going to investigate this further to see if this is something that she would like to pursue since she really does not want to increase her opioid medications. Patient also been suffering with dry eyes and constipation and does not want these to worsen by increasing her medications. Patient provided information on obtaining a medical marijuana card. Patient also provided information on weaning off of hydrocodone/acetaminophen if she should choose to pursue medical marijuana.   I did inform her that if she decides this and comes back she may resume her opioid medication after her urine drug screen no longer shows THC. At this time patient states she will continue hydrocodone/acetaminophen 7.5/325 mg tablet 3 times daily as needed for pain which is being E prescribed to the patient's pharmacy to be filled on 7/9/2023 and 8/8/2023. Patient also continues gabapentin 300 mg capsule 3 times daily. Patient verbalized understanding. [place] Prescription Drug Monitoring Program report was reviewed and was appropriate     A urine drug screen was collected at today's office visit as part of our medication management protocol. The point of care testing results were appropriate for what was being prescribed. The specimen will be sent for confirmatory testing. The drug screen is medically necessary because the patient is either dependent on opioid medication or is being considered for opioid medication therapy and the results could impact ongoing or future treatment. The drug screen is to evaluate for the presences or absence of prescribed, non-prescribed, and/or illicit drugs/substances. There are risks associated with opioid medications, including dependence, addiction and tolerance. The patient understands and agrees to use these medications only as prescribed. Potential side effects of the medications include, but are not limited to, constipation, drowsiness, addiction, impaired judgment and risk of fatal overdose if not taken as prescribed. The patient was warned against driving while taking sedation medications. Sharing medications is a felony. At this point in time, the patient is showing no signs of addiction, abuse, diversion or suicidal ideation. Follow-up is planned in 2 months time or sooner as warranted. Discharge instructions were provided. I personally saw and examined the patient and I agree with the above discussed plan of care.     History of Present Illness:    Yaniv Murguia Lizzie Jones is a 47 y.o. female who presents to [facility] for interval re-evaluation of the above stated pain complaints. The patient has a past medical and chronic pain history as outlined in the assessment section. She was last seen on 5/12/2023. At today's visit patient states that their pain symptoms are the same with a pain score of 8/10 on the verbal numeric pain scale. The patient's pain is worse in the morning, evening, and night. The patient's pain is constant in nature in nature. And the quality of the patient's pain is described as sharp and shooting. The patient's pain is located in the bilateral low back and left groin. Patient states the amount of pain relief she is obtaining from her current pain relievers is not enough lately to make a difference in her life. Patient states the amount of pain relief that she is obtaining depends on the day though it is not much. Patient continues to use hydrocodone/acetaminophen 7.5/325 mg tablet 3 times daily as needed for pain along with gabapentin 300 mg 3 times daily patient states the side effects she suffers from dry eyes and some constipation, as well as makes her irritable. Pain Contract Signed: 3/1/2023  Last Urine Drug Screen: 7/3/2023    Other than as stated above, the patient denies any interval changes in medications, medical condition, mental condition, symptoms, or allergies since the last office visit. Review of Systems:    Review of Systems   Respiratory: Negative for shortness of breath. Cardiovascular: Negative for chest pain. Gastrointestinal: Positive for constipation. Negative for diarrhea, nausea and vomiting. Musculoskeletal: Positive for gait problem. Negative for arthralgias, joint swelling and myalgias. Decreased ROM, joint stiffness, and pain in the left groin   Skin: Negative for rash. Neurological: Negative for dizziness, seizures and weakness.    All other systems reviewed and are negative. Past Medical History:   Diagnosis Date   • Allergic    • Anxiety    • Chronic hip pain, left    • GERD (gastroesophageal reflux disease)    • Visual impairment        Past Surgical History:   Procedure Laterality Date   • ANKLE ARTHROPLASTY     • ANKLE SURGERY     • BREAST LUMPECTOMY     • BREAST SURGERY Bilateral     Enlargement   • EPIDURAL BLOCK INJECTION Left 03/05/2021    Procedure: left L5 transforaminal epidural steroid injection ( [id number]); Surgeon: Sandy Clay MD;  Location: [facility] MAIN OR;  Service: Pain Management    • FL INJECTION LEFT ELBOW (NON ARTHROGRAM)  04/07/2022   • LAPAROSCOPY FOR ECTOPIC PREGNANCY      And Salpingectomy   • ORTHOPEDIC SURGERY  09/2018   • [place] ARTHROCENTESIS ASPIR&/INJ MAJOR JT/BURSA W/O US Left 05/12/2023    Procedure: Iliopsoas bursitis of left hip  ([id number]); Surgeon: Sandy Clay MD;  Location: [facility] MAIN OR;  Service: Pain Management    • [place] INJECT SI JOINT ARTHRGRPHY&/ANES/STEROID W/OKSANA Left 04/07/2022    Procedure: SACROILIAC joint injection ([id number] ); Surgeon: Sandy Clay MD;  Location: [facility] MAIN OR;  Service: Pain Management    • STEROID INJECTION HIP Left 04/16/2021    Procedure: Sacroiliac Joint Injection ([id number]);   Surgeon: Sandy Clay MD;  Location: [facility] OR;  Service: Pain Management    • TOTAL HIP ARTHROPLASTY Left 09/2018       Family History   Problem Relation Age of Onset   • Breast cancer Mother    • Arthritis Mother    • Emphysema Mother    • COPD Mother    • Alcohol abuse Father         He has been clean for 2 years   • No Known Problems Sister    • No Known Problems Brother    • No Known Problems Maternal Grandmother    • No Known Problems Maternal Grandfather    • No Known Problems Paternal Grandmother    • No Known Problems Paternal Grandfather    • Breast cancer Maternal Aunt    • Cancer Maternal Aunt    • No Known Problems Maternal Uncle    • No Known Problems Paternal Aunt    • No Known Problems Paternal Uncle        Social History     Occupational History   • Not on file   Tobacco Use   • Smoking status: Never   • Smokeless tobacco: Never   • Tobacco comments:     Former smoker, per allscripts   Vaping Use   • Vaping Use: Never used   Substance and Sexual Activity   • Alcohol use: No     Comment: social drinker, per allscripts   • Drug use: No   • Sexual activity: Not Currently     Partners: Male     Birth control/protection: Post-menopausal         Current Outpatient Medications:   •  gabapentin (NEURONTIN) 300 mg capsule, Take 1 capsule (300 mg total) by mouth 3 (three) times a day, Disp: 90 capsule, Rfl: 2  •  [START ON 8/8/2023] HYDROcodone-acetaminophen (NORCO) 7.5-325 mg per tablet, Take 1 tablet by mouth 3 (three) times a day as needed for pain Max Daily Amount: 3 tablets Do not start before August 8, 2023., Disp: 90 tablet, Rfl: 0  •  [START ON 7/9/2023] HYDROcodone-acetaminophen (NORCO) 7.5-325 mg per tablet, Take 1 tablet by mouth 3 (three) times a day as needed for pain Max Daily Amount: 3 tablets Do not start before July 9, 2023., Disp: 90 tablet, Rfl: 0  •  meloxicam (MOBIC) 15 mg tablet, Take 1 tablet (15 mg total) by mouth daily Take with food, Disp: 30 tablet, Rfl: 2    Allergies   Allergen Reactions   • Peach [Prunus Persica] Other (See Comments)     Closure of throat with peaches, apricots,plums , nectarine ,and almonds       Physical Exam:    /82   Pulse 73   Ht 5' 2" (1.575 m)   Wt 79.7 kg (175 lb 9.6 oz)   BMI 32.12 kg/m²     Constitutional:normal, well developed, well nourished, alert, in no distress and non-toxic and no overt pain behavior.  and obese  Eyes:anicteric  HEENT:grossly intact  Neck:supple, symmetric, trachea midline and no masses   Pulmonary:even and unlabored  Cardiovascular:No edema or pitting edema present  Skin:Normal without rashes or lesions and well hydrated  Psychiatric:Mood and affect appropriate  Neurologic:Cranial Nerves II-XII grossly intact  Musculoskeletal:normal        Orders Placed This Encounter   Procedures   • MM ALL_Prescribed Meds and Special Instructions   • MM DT_Alprazolam Definitive Test   • MM DT_Amphetamine Definitive Test   • MM DT_Aripiprazole Definitive Test   • MM DT_Bath Salts Definitive Test   • MM DT_Buprenorphine Definitive Test   • MM DT_Butalbital Definitive Test   • MM DT_Clozapine Definitive Test   • MM DT_Clonazepam Definitive Test   • MM DT_Cocaine Definitive Test   • MM DT_Codeine Definitive Test   • MM DT_Desipramine Definitive Test   • MM DT_Dextromethorphan Definitive Test   • MM Diazepam Definitive Test   • MM DT_Ethyl Glucuronide/Ethyl Sulfate Definitive Test   • MM DT_Fentanyl Definitive Test   • MM DT_Haloperidol Definitive Test   • MM DT_Heroin Definitive Test   • MM DT_Hydrocodone Definitive Test   • MM DT_Hydromorphone Definitive Test   • MM DT_Imipramine Definitive Test   • MM DT_Kratom Definitive Test   • MM DT_Levorphanol Definitive Test   • MM Lorazepam Definitive Test   • MM DT_MDMA Definitive Test   • MM DT_Meperidine Definitive Test   • MM DT_Methadone Definitive Test   • MM DT_Methamphetamine Definitive Test   • MM DT_Methylphenidate Definitive Test   • MM DT_Morphine Definitive Test   • MM DT_Oxazepam Definitive Test   • MM DT_Oxycodone Definitive Test   • MM DT_Oxymorphone Definitive Test   • MM DT_Phenobarbital Definitive Test   • MM DT_Phencyclidine Definitive Test   • MM DT_Phentermine Definitive Test   • MM DT_Secobarbital Definitive Test   • MM DT_Spice Definitive Test   • MM DT_Tapentadol Definitive Test   • MM DT_Temazapam Definitive Test   • MM DT_THC Definitive Test   • MM DT_Tramadol Definitive Test       This document was created using speech voice recognition software. Grammatical errors, random word insertions, pronoun errors, and incomplete sentences are an occasional consequence of this system due to software limitations, ambient noise, and hardware issues.    Any formal questions or concerns about content, text, or information contained within the body of this dictation should be directly addressed to the provider for clarification.

## 2023-07-03 NOTE — PATIENT INSTRUCTIONS
If you decide to DC opioid medication,  take one less pill every day for week until DC  So week 1 take twice a day , week 2 take one a day and if necessary take 1/2 tab daily for 7 days then stop.

## 2023-07-06 LAB
6MAM UR QL CFM: NEGATIVE NG/ML
7AMINOCLONAZEPAM UR QL CFM: NEGATIVE NG/ML
A-OH ALPRAZ UR QL CFM: NEGATIVE NG/ML
AMPHET UR QL CFM: NEGATIVE NG/ML
AMPHET UR QL CFM: NEGATIVE NG/ML
BUPRENORPHINE UR QL CFM: NEGATIVE NG/ML
BUTALBITAL UR QL CFM: NEGATIVE NG/ML
BZE UR QL CFM: NEGATIVE NG/ML
CODEINE UR QL CFM: NEGATIVE NG/ML
DESIPRAMINE UR QL CFM: NEGATIVE NG/ML
DESIPRAMINE UR QL CFM: NEGATIVE NG/ML
EDDP UR QL CFM: NEGATIVE NG/ML
ETHYL GLUCURONIDE UR QL CFM: NEGATIVE NG/ML
ETHYL SULFATE UR QL SCN: NEGATIVE NG/ML
EUTYLONE UR QL: NEGATIVE NG/ML
FENTANYL UR QL CFM: NEGATIVE NG/ML
GLIADIN IGG SER IA-ACNC: NEGATIVE NG/ML
GLUCOSE 30M P 50 G LAC PO SERPL-MCNC: NEGATIVE NG/ML
HYDROCODONE UR QL CFM: NORMAL NG/ML
HYDROCODONE UR QL CFM: NORMAL NG/ML
HYDROMORPHONE UR QL CFM: NORMAL NG/ML
IMIPRAMINE UR QL CFM: NEGATIVE NG/ML
LORAZEPAM UR QL CFM: NEGATIVE NG/ML
MDMA UR QL CFM: NEGATIVE NG/ML
ME-PHENIDATE UR QL CFM: NEGATIVE NG/ML
MEPERIDINE UR QL CFM: NEGATIVE NG/ML
METHADONE UR QL CFM: NEGATIVE NG/ML
METHAMPHET UR QL CFM: NEGATIVE NG/ML
MORPHINE UR QL CFM: NEGATIVE NG/ML
MORPHINE UR QL CFM: NEGATIVE NG/ML
NORBUPRENORPHINE UR QL CFM: NEGATIVE NG/ML
NORDIAZEPAM UR QL CFM: NEGATIVE NG/ML
NORFENTANYL UR QL CFM: NEGATIVE NG/ML
NORHYDROCODONE UR QL CFM: NORMAL NG/ML
NORHYDROCODONE UR QL CFM: NORMAL NG/ML
NORMEPERIDINE UR QL CFM: NEGATIVE NG/ML
NOROXYCODONE UR QL CFM: NEGATIVE NG/ML
OPC-3373 QUANTIFICATION: NEGATIVE
OXAZEPAM UR QL CFM: NEGATIVE NG/ML
OXYCODONE UR QL CFM: NEGATIVE NG/ML
OXYMORPHONE UR QL CFM: NEGATIVE NG/ML
OXYMORPHONE UR QL CFM: NEGATIVE NG/ML
PARA-FLUOROFENTANYL QUANTIFICATION: NORMAL NG/ML
PCP UR QL CFM: NEGATIVE NG/ML
PHENOBARB UR QL CFM: NEGATIVE NG/ML
RESULT ALL_PRESCRIBED MEDS AND SPECIAL INSTRUCTIONS: NORMAL
SECOBARBITAL UR QL CFM: NEGATIVE NG/ML
SL AMB 4-ANPP QUANTIFICATION: NORMAL NG/ML
SL AMB 5F-ADB-M7 METABOLITE QUANTIFICATION: NEGATIVE NG/ML
SL AMB 7-OH-MITRAGYNINE (KRATOM ALKALOID) QUANTIFICATION: NEGATIVE NG/ML
SL AMB AB-FUBINACA-M3 METABOLITE QUANTIFICATION: NEGATIVE NG/ML
SL AMB ACETYL FENTANYL QUANTIFICATION: NORMAL NG/ML
SL AMB ACETYL NORFENTANYL QUANTIFICATION: NORMAL NG/ML
SL AMB ACRYL FENTANYL QUANTIFICATION: NORMAL NG/ML
SL AMB CARFENTANIL QUANTIFICATION: NORMAL NG/ML
SL AMB CLOZAPINE QUANTIFICATION: NEGATIVE NG/ML
SL AMB CTHC (MARIJUANA METABOLITE) QUANTIFICATION: NEGATIVE NG/ML
SL AMB DEXTROMETHORPHAN QUANTIFICATION: NEGATIVE NG/ML
SL AMB DEXTRORPHAN (DEXTROMETHORPHAN METABOLITE) QUANT: NEGATIVE NG/ML
SL AMB DEXTRORPHAN (DEXTROMETHORPHAN METABOLITE) QUANT: NEGATIVE NG/ML
SL AMB HALOPERIDOL  QUANTIFICATION: NEGATIVE NG/ML
SL AMB HALOPERIDOL METABOLITE QUANTIFICATION: NEGATIVE NG/ML
SL AMB JWH018 METABOLITE QUANTIFICATION: NEGATIVE NG/ML
SL AMB JWH073 METABOLITE QUANTIFICATION: NEGATIVE NG/ML
SL AMB MDMB-FUBINACA-M1 METABOLITE QUANTIFICATION: NEGATIVE NG/ML
SL AMB METHYLONE QUANTIFICATION: NEGATIVE NG/ML
SL AMB N-DESMETHYL-TRAMADOL QUANTIFICATION: NEGATIVE NG/ML
SL AMB N-DESMETHYLCLOZAPINE QUANTIFICATION: NEGATIVE NG/ML
SL AMB PHENTERMINE QUANTIFICATION: NEGATIVE NG/ML
SL AMB RCS4 METABOLITE QUANTIFICATION: NEGATIVE NG/ML
SL AMB RITALINIC ACID QUANTIFICATION: NEGATIVE NG/ML
SPECIMEN DRAWN SERPL: NEGATIVE NG/ML
TAPENTADOL UR QL CFM: NEGATIVE NG/ML
TEMAZEPAM UR QL CFM: NEGATIVE NG/ML
TEMAZEPAM UR QL CFM: NEGATIVE NG/ML
TRAMADOL UR QL CFM: NEGATIVE NG/ML
URATE/CREAT 24H UR: NEGATIVE NG/ML

## 2023-07-26 ENCOUNTER — HOSPITAL ENCOUNTER (OUTPATIENT)
Dept: RADIOLOGY | Facility: HOSPITAL | Age: 53
Discharge: HOME | End: 2023-07-26
Attending: ORTHOPAEDIC SURGERY
Payer: COMMERCIAL

## 2023-07-26 DIAGNOSIS — R10.32 LEFT GROIN PAIN: ICD-10-CM

## 2023-07-26 DIAGNOSIS — Z96.642 HISTORY OF TOTAL HIP ARTHROPLASTY, LEFT: ICD-10-CM

## 2023-07-26 PROCEDURE — 73721 MRI JNT OF LWR EXTRE W/O DYE: CPT

## 2023-07-26 PROCEDURE — G1004 CDSM NDSC: HCPCS

## 2023-07-31 ENCOUNTER — OFFICE VISIT (OUTPATIENT)
Dept: FAMILY MEDICINE CLINIC | Facility: CLINIC | Age: 53
End: 2023-07-31
Payer: COMMERCIAL

## 2023-07-31 ENCOUNTER — TELEPHONE (OUTPATIENT)
Dept: OBGYN CLINIC | Facility: CLINIC | Age: 53
End: 2023-07-31

## 2023-07-31 VITALS
HEIGHT: 62 IN | RESPIRATION RATE: 16 BRPM | DIASTOLIC BLOOD PRESSURE: 72 MMHG | BODY MASS INDEX: 31.73 KG/M2 | SYSTOLIC BLOOD PRESSURE: 100 MMHG | TEMPERATURE: 97.9 F | HEART RATE: 62 BPM | WEIGHT: 172.4 LBS

## 2023-07-31 DIAGNOSIS — Z00.00 ANNUAL PHYSICAL EXAM: Primary | ICD-10-CM

## 2023-07-31 DIAGNOSIS — Z12.12 ENCOUNTER FOR COLORECTAL CANCER SCREENING: ICD-10-CM

## 2023-07-31 DIAGNOSIS — T85.43XD RUPTURE OF IMPLANT OF RIGHT BREAST, SUBSEQUENT ENCOUNTER: ICD-10-CM

## 2023-07-31 DIAGNOSIS — Z12.11 ENCOUNTER FOR COLORECTAL CANCER SCREENING: ICD-10-CM

## 2023-07-31 DIAGNOSIS — R10.10 PAIN OF UPPER ABDOMEN: ICD-10-CM

## 2023-07-31 DIAGNOSIS — R76.8 ANA POSITIVE: ICD-10-CM

## 2023-07-31 DIAGNOSIS — M46.1 SACROILIITIS (HCC): ICD-10-CM

## 2023-07-31 DIAGNOSIS — Z12.4 CERVICAL CANCER SCREENING: ICD-10-CM

## 2023-07-31 PROBLEM — L23.7 POISON IVY: Status: RESOLVED | Noted: 2020-05-01 | Resolved: 2023-07-31

## 2023-07-31 LAB
SL AMB  POCT GLUCOSE, UA: NORMAL
SL AMB LEUKOCYTE ESTERASE,UA: NORMAL
SL AMB POCT BILIRUBIN,UA: NORMAL
SL AMB POCT BLOOD,UA: NORMAL
SL AMB POCT CLARITY,UA: CLEAR
SL AMB POCT COLOR,UA: YELLOW
SL AMB POCT KETONES,UA: NORMAL
SL AMB POCT NITRITE,UA: NORMAL
SL AMB POCT PH,UA: 5
SL AMB POCT SPECIFIC GRAVITY,UA: 1.01
SL AMB POCT URINE PROTEIN: NORMAL
SL AMB POCT UROBILINOGEN: NORMAL

## 2023-07-31 PROCEDURE — 99396 PREV VISIT EST AGE 40-64: CPT | Performed by: FAMILY MEDICINE

## 2023-07-31 PROCEDURE — 81003 URINALYSIS AUTO W/O SCOPE: CPT | Performed by: FAMILY MEDICINE

## 2023-07-31 NOTE — TELEPHONE ENCOUNTER
The date and time of this documentation I discussed the results of her left hip MRI. I reviewed with her that there is no evidence of iliopsoas tendinitis, tendinosis, or iliopsoas bursitis. There is no soft tissue pathology around the hip. The arthroplasty components appear to be well fixed. There is a normal amount of joint fluid present. At this point without hip pathology as a result of her pain I recommended that she continue to follow with spine and pain for management of her lumbosacral and sacroiliac pain which may be referring to the anterior aspect of her hip. No further work-up of her left hip is needed at this time.

## 2023-07-31 NOTE — PROGRESS NOTES
[facility] HEALTH MAINTENANCE OFFICE VISIT  [facility]    NAME: Patrice Camara  AGE: 47 y.o. SEX: female  : 1970     DATE: 2023    Assessment and Plan     1. Annual physical exam  -     POCT urine dip auto non-scope    2. Encounter for colorectal cancer screening  -     Cologuard    3. Cervical cancer screening  -     Liquid-based pap, screening    4. Sacroiliitis (HCC)  Assessment & Plan:  Follows w ortho/pain mgt      5. Rupture of implant of right breast, subsequent encounter  Assessment & Plan:  Pt reports hx breast implants (?saline?) --surgery done in [place] 2009  Will try to move forward appt for imaging to check for rupture on right      6. Pain of upper abdomen  Assessment & Plan:  THALIA + nuclear dot pattern--?pattern w primary biliary cirrhosis per lab  Check imaging-GI consult pending result    Orders:  -     US abdomen complete; Future; Expected date: 2023    7. THALIA positive  Assessment & Plan:  Nuclear dot pattern--borderline 1:80--?prim biliary cirrhosis association    Orders:  -     US abdomen complete; Future; Expected date: 2023    8. BMI 31.0-31.9,adult        Patient Counseling:   Nutrition: Stressed importance of a well balanced diet, moderation of sodium/saturated fat, caloric balance and sufficient intake of fiber  Exercise: Stressed the importance of regular exercise with a goal of 150 minutes per week  Dental Health: Discussed daily flossing and brushing and regular dental visits   Alcohol Use:  Recommended moderation of alcohol intake  Injury Prevention: Discussed Safety Belts, Safety Helmets, and Smoke Detectors    Immunizations reviewed: Risks and Benefits discussed  Discussed benefits of:  Colon Cancer Screening, Mammogram , Cervical Cancer screening and Screening labs. BMI Counseling: Body mass index is 31.53 kg/m². Discussed with patient's BMI with her.  The BMI is above normal. Nutrition recommendations include 3-5 servings of fruits/vegetables daily, consuming healthier snacks, moderation in carbohydrate intake, increasing intake of lean protein, reducing intake of saturated fat and trans fat and reducing intake of cholesterol. Exercise recommendations include exercising 3-5 times per week and strength training exercises. Chief Complaint     Chief Complaint   Patient presents with   • Physical Exam     pap       History of Present Illness     HPI    Well Adult Physical   Patient here for a comprehensive physical exam.      Diet and Physical Activity  Diet: well balanced diet  Exercise: intermittently      Depression Screen  PHQ-2/9 Depression Screening            General Health  Hearing: Normal:  bilateral  Vision: goes for regular eye exams  Dental: regular dental visits    Reproductive Health  pap done today      The following portions of the patient's history were reviewed and updated as appropriate: allergies, current medications, past family history, past medical history, past social history, past surgical history and problem list.    Review of Systems     Review of Systems   Constitutional: Positive for fatigue and unexpected weight change. Eyes:        Dry eyes   Respiratory: Negative. Cardiovascular: Negative. Gastrointestinal:        Gerd   Musculoskeletal: Positive for arthralgias. Psychiatric/Behavioral: Positive for sleep disturbance. Past Medical History     Past Medical History:   Diagnosis Date   • Allergic    • Anxiety    • Chronic hip pain, left    • GERD (gastroesophageal reflux disease)    • Visual impairment        Past Surgical History     Past Surgical History:   Procedure Laterality Date   • ANKLE ARTHROPLASTY     • ANKLE SURGERY     • BREAST LUMPECTOMY     • BREAST SURGERY Bilateral     Enlargement   • EPIDURAL BLOCK INJECTION Left 03/05/2021    Procedure: left L5 transforaminal epidural steroid injection ( 80533);   Surgeon: Nay Knowles MD;  Location: [facility] MAIN OR; Service: Pain Management    • FL INJECTION LEFT ELBOW (NON ARTHROGRAM)  04/07/2022   • LAPAROSCOPY FOR ECTOPIC PREGNANCY      And Salpingectomy   • ORTHOPEDIC SURGERY  09/2018   • MS ARTHROCENTESIS ASPIR&/INJ MAJOR JT/BURSA W/O US Left 05/12/2023    Procedure: Iliopsoas bursitis of left hip  (35846 87618); Surgeon: Bridget Chun MD;  Location: [facility] MAIN OR;  Service: Pain Management    • MS INJECT SI JOINT ARTHRGRPHY&/ANES/STEROID W/OKSANA Left 04/07/2022    Procedure: SACROILIAC joint injection (96065 ); Surgeon: Bridget Chun MD;  Location: [facility] MAIN OR;  Service: Pain Management    • STEROID INJECTION HIP Left 04/16/2021    Procedure: Sacroiliac Joint Injection (12645);   Surgeon: Bridget Chun MD;  Location: [facility] MAIN OR;  Service: Pain Management    • TOTAL HIP ARTHROPLASTY Left 09/2018       Social History     Social History     Socioeconomic History   • Marital status: Single     Spouse name: None   • Number of children: None   • Years of education: None   • Highest education level: None   Occupational History   • None   Tobacco Use   • Smoking status: Never   • Smokeless tobacco: Never   • Tobacco comments:     Former smoker, per allscripts   Vaping Use   • Vaping Use: Never used   Substance and Sexual Activity   • Alcohol use: No     Comment: social drinker, per allscripts   • Drug use: No   • Sexual activity: Not Currently     Partners: Male     Birth control/protection: Post-menopausal   Other Topics Concern   • None   Social History Narrative    Daily caffeinated coffee consumption     Social Determinants of Health     Financial Resource Strain: Not on file   Food Insecurity: Not on file   Transportation Needs: Not on file   Physical Activity: Not on file   Stress: Not on file   Social Connections: Not on file   Intimate Partner Violence: Not on file   Housing Stability: Not on file       Family History     Family History   Problem Relation Age of Onset   • Breast cancer Mother    • Arthritis Mother    • Emphysema Mother    • COPD Mother    • Alcohol abuse Father         He has been clean for 2 years   • No Known Problems Sister    • No Known Problems Brother    • No Known Problems Maternal Grandmother    • No Known Problems Maternal Grandfather    • No Known Problems Paternal Grandmother    • No Known Problems Paternal Grandfather    • Breast cancer Maternal Aunt    • Cancer Maternal Aunt    • No Known Problems Maternal Uncle    • No Known Problems Paternal Aunt    • No Known Problems Paternal Uncle        Current Medications       Current Outpatient Medications:   •  gabapentin (NEURONTIN) 300 mg capsule, Take 1 capsule (300 mg total) by mouth 3 (three) times a day, Disp: 90 capsule, Rfl: 2  •  HYDROcodone-acetaminophen (NORCO) 7.5-325 mg per tablet, Take 1 tablet by mouth 3 (three) times a day as needed for pain Max Daily Amount: 3 tablets Do not start before July 9, 2023., Disp: 90 tablet, Rfl: 0  •  meloxicam (MOBIC) 15 mg tablet, Take 1 tablet (15 mg total) by mouth daily Take with food, Disp: 30 tablet, Rfl: 2     Allergies     Allergies   Allergen Reactions   • Peach [Prunus Persica] Other (See Comments)     Closure of throat with peaches, apricots,plums , nectarine ,and almonds       Objective     /72 (BP Location: Left arm, Patient Position: Sitting, Cuff Size: Large)   Pulse 62   Temp 97.9 °F (36.6 °C)   Resp 16   Ht 5' 2" (1.575 m)   Wt 78.2 kg (172 lb 6.4 oz)   BMI 31.53 kg/m²      Physical Exam  Vitals and nursing note reviewed. Constitutional:       General: She is not in acute distress. Comments: OW   HENT:      Nose: Nose normal.   Eyes:      General: No scleral icterus. Conjunctiva/sclera: Conjunctivae normal.   Cardiovascular:      Rate and Rhythm: Normal rate and regular rhythm. Pulmonary:      Effort: Pulmonary effort is normal. No respiratory distress. Abdominal:      General: Bowel sounds are normal.      Palpations: Abdomen is soft. Tenderness:  There is no abdominal tenderness. There is no right CVA tenderness or left CVA tenderness. Genitourinary:     Comments: Breast exam:   No SC or axillary LAD. B/l breast implants--asymmetry on right side  No nipple d/c    No v/v lesion  +cervical os stenosis  No CMT or uterine tenderness  Musculoskeletal:         General: Tenderness present. Cervical back: Neck supple. Skin:     General: Skin is warm and dry. Coloration: Skin is not jaundiced. Neurological:      Mental Status: She is alert and oriented to person, place, and time. Cranial Nerves: No cranial nerve deficit.    Psychiatric:         Mood and Affect: Mood normal.           Vision Screening    Right eye Left eye Both eyes   Without correction 20/25 20/40 20/25   With correction              Negro Sanders MD  [facility]

## 2023-08-01 ENCOUNTER — TELEPHONE (OUTPATIENT)
Dept: FAMILY MEDICINE CLINIC | Facility: CLINIC | Age: 53
End: 2023-08-01

## 2023-08-01 NOTE — TELEPHONE ENCOUNTER
Spoke to patient she is unable to go to [place] in [place] so I gave the number for [facility]

## 2023-08-01 NOTE — ASSESSMENT & PLAN NOTE
THALIA + nuclear dot pattern--?pattern w primary biliary cirrhosis per lab  Check imaging-GI consult pending result

## 2023-08-01 NOTE — ASSESSMENT & PLAN NOTE
Pt reports hx breast implants (?saline?) --surgery done in [place] 4/2009  Will try to move forward appt for imaging to check for rupture on right

## 2023-08-07 LAB
CYTOLOGIST CVX/VAG CYTO: NORMAL
DX ICD CODE: NORMAL
OTHER STN SPEC: NORMAL
OTHER STN SPEC: NORMAL
PATH REPORT.FINAL DX SPEC: NORMAL
SL AMB NOTE:: NORMAL
SL AMB SPECIMEN ADEQUACY: NORMAL
SL AMB TEST METHODOLOGY: NORMAL

## 2023-08-23 LAB — COLOGUARD RESULT REPORTABLE: NEGATIVE

## 2023-09-11 DIAGNOSIS — G89.4 CHRONIC PAIN SYNDROME: ICD-10-CM

## 2023-09-11 RX ORDER — MELOXICAM 15 MG/1
15 TABLET ORAL DAILY
Qty: 30 TABLET | Refills: 2 | Status: SHIPPED | OUTPATIENT
Start: 2023-09-11

## 2023-09-18 ENCOUNTER — HOSPITAL ENCOUNTER (OUTPATIENT)
Dept: RADIOLOGY | Facility: HOSPITAL | Age: 53
Discharge: HOME | End: 2023-09-18
Payer: COMMERCIAL

## 2023-09-18 VITALS — BODY MASS INDEX: 30.36 KG/M2 | WEIGHT: 165 LBS | HEIGHT: 62 IN

## 2023-09-18 DIAGNOSIS — N64.89 BREAST ASYMMETRY: ICD-10-CM

## 2023-09-18 DIAGNOSIS — Z98.82 HISTORY OF BILATERAL BREAST IMPLANTS: ICD-10-CM

## 2023-09-18 DIAGNOSIS — Z87.828 HISTORY OF TRAUMA OF BREAST: ICD-10-CM

## 2023-09-18 PROCEDURE — 77066 DX MAMMO INCL CAD BI: CPT

## 2023-09-18 PROCEDURE — 76642 ULTRASOUND BREAST LIMITED: CPT

## 2023-09-18 PROCEDURE — G0279 TOMOSYNTHESIS, MAMMO: HCPCS

## 2023-09-26 DIAGNOSIS — T85.43XD RUPTURE OF IMPLANT OF RIGHT BREAST, SUBSEQUENT ENCOUNTER: Primary | ICD-10-CM

## 2023-09-29 PROBLEM — Z12.12 ENCOUNTER FOR COLORECTAL CANCER SCREENING: Status: RESOLVED | Noted: 2020-12-20 | Resolved: 2023-09-29

## 2023-09-29 PROBLEM — Z12.11 ENCOUNTER FOR COLORECTAL CANCER SCREENING: Status: RESOLVED | Noted: 2020-12-20 | Resolved: 2023-09-29

## 2023-09-29 PROBLEM — Z12.4 CERVICAL CANCER SCREENING: Status: RESOLVED | Noted: 2023-07-31 | Resolved: 2023-09-29

## 2023-10-04 ENCOUNTER — TELEPHONE (OUTPATIENT)
Dept: FAMILY MEDICINE CLINIC | Facility: CLINIC | Age: 53
End: 2023-10-04

## 2023-10-04 NOTE — TELEPHONE ENCOUNTER
Called central scheduling and spoke with [facility]. She was able to schedule abdominal ultrasound for 10/12. She advised that they cannot schedule follow up rt breast MRI no charge as noted in order. Need new order stating MRI breast bilateral with and without contrast. Says she is unable to write no charge unless radiologist agrees and that it will need insurance authorization. Patient advised of abdominal ultrasound date and that we will get back to her regarding scheduling breast MRI.

## 2023-10-04 NOTE — TELEPHONE ENCOUNTER
----- Message from Sung Tong sent at 10/4/2023  8:40 AM EDT -----  Regarding: FW: Mammogram and sonogram results  Contact: [phone]  Please reach out to patient and asked if she was able to reach central scheduling for her appt.    ----- Message -----  From: Lio Rivera MD  Sent: 10/3/2023  11:36 PM EDT  To: Sung Tong  Subject: FW: Mammogram and sonogram results               Please assist pt with scheduling--thanks  ----- Message -----  From: Kojo Camarena MA  Sent: 10/2/2023   5:03 PM EDT  To: Lio Rivera MD  Subject: FW: Mammogram and sonogram results                 ----- Message -----  From: Alecia Carter MA  Sent: 10/2/2023  11:57 AM EDT  To: [facility]  Subject: FW: Mammogram and sonogram results                 ----- Message -----  From: Brenda Hartmann  Sent: 9/29/2023  10:06 AM EDT  To: [facility]  Subject: Mammogram and sonogram results                   When will they reach out for appointment? Also  The doctor in hospital said something about a plastic surgeon?

## 2023-10-06 ENCOUNTER — TELEPHONE (OUTPATIENT)
Age: 53
End: 2023-10-06

## 2023-10-06 DIAGNOSIS — T85.43XD RUPTURE OF IMPLANT OF RIGHT BREAST, SUBSEQUENT ENCOUNTER: Primary | ICD-10-CM

## 2023-10-06 NOTE — TELEPHONE ENCOUNTER
See prior message--Please call breast care center/radiology scheduling to see if appt can be moved up--thanks

## 2023-10-06 NOTE — TELEPHONE ENCOUNTER
Patient is requesting her MRI be stat . Radiology can not get her in until November . She is concerned due to having the symptoms consistent to breast Implant illness. C/o hair loss, dry eyes, confusion and nausea with gagging at times .  Please advise

## 2023-10-10 NOTE — TELEPHONE ENCOUNTER
Called patient and advised. Tried to schedule with Yehuda Schmitt in [place] scheduling, but she has numerous questions for patients so she will call her directly. She also advised that patient may have go outside network as she has [address] has to stay in [place], and this test is only offered at [facility] or [facility].

## 2023-10-10 NOTE — TELEPHONE ENCOUNTER
Tried to schedule breast MRI with [facility] in [place] scheduling, but she has numerous questions for patients so she will call her directly. She also advised that patient may have go outside network as she has [address] has to stay in [place], and this test is only offered at [facility] or [facility].

## 2023-10-10 NOTE — TELEPHONE ENCOUNTER
Called patient. She wants order faxed to   [address] [facility]. She checked, and they take her insurance. Faxed order to [phone].

## 2023-10-10 NOTE — TELEPHONE ENCOUNTER
Pt called back. She said they would not schedule her because of her insurance. Pt did not want to go to [facility] or [place]. She wants to know if they know her implant is ruptured why do they need the MRI. She wants to know why they can't just treat her. Pt said she has to make all these phone calls and the doctor just writes the orders. Can you please, get back to patient today? She is very anxious about her situation. I suggested the patient call her insurance to see where she can go. She said she knows she can go to [facility] [place]. Their phone number is [phone]. She wants to know if her order can be sent there. She said that someone in the office printed this out for her- Yara Constantino. Thank you.

## 2023-10-10 NOTE — TELEPHONE ENCOUNTER
Order can be sent to that facility if in her network--they will probably still need to get prior auth first--other option is to see which breast care specialist is in her network but I would think they would want to see MRI first as well--but at least she can make appt to get that part set and they can look at imaging once completed

## 2023-10-12 ENCOUNTER — HOSPITAL ENCOUNTER (OUTPATIENT)
Dept: RADIOLOGY | Facility: HOSPITAL | Age: 53
Discharge: HOME | End: 2023-10-12
Attending: FAMILY MEDICINE
Payer: COMMERCIAL

## 2023-10-12 DIAGNOSIS — R10.10 PAIN OF UPPER ABDOMEN: ICD-10-CM

## 2023-10-12 DIAGNOSIS — R76.8 ANA POSITIVE: ICD-10-CM

## 2023-10-12 PROCEDURE — 76700 US EXAM ABDOM COMPLETE: CPT

## 2023-10-30 ENCOUNTER — TELEPHONE (OUTPATIENT)
Dept: FAMILY MEDICINE CLINIC | Facility: CLINIC | Age: 53
End: 2023-10-30

## 2023-10-30 NOTE — TELEPHONE ENCOUNTER
Called patient to inform her of normal abd u/s results and she asked what her next step is now that her mri has been denied.  Denial letter is scanned in

## 2023-10-30 NOTE — TELEPHONE ENCOUNTER
See message below--went to enter referral but do not see SL specialist in [place] in her plan---please check with [address] if anyone available in our network--dx-ruptured breast implant--if not then please ask pt to check with her insurance for breast care specialist and call back with info for me to enter--may need to go outside network.   thanks

## 2023-10-30 NOTE — TELEPHONE ENCOUNTER
If mri breast has been denied then I would recommend consultation with breast care specialist as next step.   I entered referral for pt--please advise to call to sched appt--thanks

## 2023-10-31 DIAGNOSIS — T85.43XD RUPTURE OF IMPLANT OF RIGHT BREAST, SUBSEQUENT ENCOUNTER: Primary | ICD-10-CM

## 2023-10-31 NOTE — TELEPHONE ENCOUNTER
Nothing within network for patient's insurance. Spoke with Juan Mail at [address] [facility] and confirmed they are accepting new patients with [facility]. Address [address] [facility], [address] [place] [address]. Please place order for same. Called patient and gave her info to call for appointment. She requested we fax imaging results with order to them at [phone] so she can call them for appointment.

## 2023-10-31 NOTE — TELEPHONE ENCOUNTER
Faxed order and test results to breast center. Called patient and advised, she willc all them now for appointment.

## 2023-11-01 NOTE — TELEPHONE ENCOUNTER
Shane Gutierrez called from [facility]. They do not see patients for implant related issues- only cancer. She suggested plastic surgeon. Called general surgeon  Dr Posey Blizzard office and spoke with nurse Maryan Veliz, she said they also do not see patients for implants. [facility] website and found plastic surgeon Dr Sidney Chan, [address] [place] [address], [address] [place] [address] Called and spoke with [facility] [place] who confirmed they are accepting new patients and will see patient for this issue. Called patient and advised. Please place order for plastic surgeon Dr Adolfo Guerrero, so I can fax with MRI report to .

## 2023-11-02 DIAGNOSIS — T85.43XD RUPTURE OF IMPLANT OF RIGHT BREAST, SUBSEQUENT ENCOUNTER: Primary | ICD-10-CM

## 2023-11-29 ENCOUNTER — TELEPHONE (OUTPATIENT)
Age: 53
End: 2023-11-29

## 2023-11-29 NOTE — TELEPHONE ENCOUNTER
Pt calling with referral for ruptured implant however pt lives in [place] and with her insurance has to stay within the state of [facility] her all of our surgeons are in [place], but recommended she contact [employer] [facility] for a list of providers in her area

## 2023-11-30 DIAGNOSIS — T85.43XD RUPTURE OF IMPLANT OF RIGHT BREAST, SUBSEQUENT ENCOUNTER: Primary | ICD-10-CM

## 2024-01-22 ENCOUNTER — OFFICE VISIT (OUTPATIENT)
Dept: FAMILY MEDICINE CLINIC | Facility: CLINIC | Age: 54
End: 2024-01-22
Payer: COMMERCIAL

## 2024-01-22 VITALS
OXYGEN SATURATION: 98 % | RESPIRATION RATE: 16 BRPM | TEMPERATURE: 97.8 F | HEIGHT: 62 IN | HEART RATE: 63 BPM | WEIGHT: 149.8 LBS | BODY MASS INDEX: 27.57 KG/M2 | SYSTOLIC BLOOD PRESSURE: 144 MMHG | DIASTOLIC BLOOD PRESSURE: 102 MMHG

## 2024-01-22 DIAGNOSIS — E78.2 MIXED HYPERLIPIDEMIA: ICD-10-CM

## 2024-01-22 DIAGNOSIS — Z13.0 SCREENING FOR DEFICIENCY ANEMIA: ICD-10-CM

## 2024-01-22 DIAGNOSIS — Z13.29 SCREENING FOR THYROID DISORDER: ICD-10-CM

## 2024-01-22 DIAGNOSIS — Z11.59 NEED FOR HEPATITIS C SCREENING TEST: ICD-10-CM

## 2024-01-22 DIAGNOSIS — R10.2 PELVIC CRAMPING: Primary | ICD-10-CM

## 2024-01-22 DIAGNOSIS — R10.84 GENERALIZED ABDOMINAL PAIN: ICD-10-CM

## 2024-01-22 DIAGNOSIS — R73.09 ABNORMAL GLUCOSE: ICD-10-CM

## 2024-01-22 DIAGNOSIS — Z13.220 SCREENING FOR LIPID DISORDERS: ICD-10-CM

## 2024-01-22 DIAGNOSIS — R76.8 ANA POSITIVE: ICD-10-CM

## 2024-01-22 DIAGNOSIS — Z13.1 SCREENING FOR DIABETES MELLITUS: ICD-10-CM

## 2024-01-22 DIAGNOSIS — R03.0 ELEVATED BLOOD PRESSURE READING: ICD-10-CM

## 2024-01-22 LAB
SL AMB  POCT GLUCOSE, UA: NORMAL
SL AMB LEUKOCYTE ESTERASE,UA: NORMAL
SL AMB POCT BILIRUBIN,UA: NORMAL
SL AMB POCT BLOOD,UA: NORMAL
SL AMB POCT CLARITY,UA: CLEAR
SL AMB POCT COLOR,UA: YELLOW
SL AMB POCT KETONES,UA: NORMAL
SL AMB POCT NITRITE,UA: NORMAL
SL AMB POCT PH,UA: 7
SL AMB POCT SPECIFIC GRAVITY,UA: 1
SL AMB POCT URINE PROTEIN: NORMAL
SL AMB POCT UROBILINOGEN: NORMAL

## 2024-01-22 PROCEDURE — 99214 OFFICE O/P EST MOD 30 MIN: CPT | Performed by: FAMILY MEDICINE

## 2024-01-22 PROCEDURE — 81003 URINALYSIS AUTO W/O SCOPE: CPT | Performed by: FAMILY MEDICINE

## 2024-01-22 NOTE — PROGRESS NOTES
Assessment/Plan:    1. Pelvic cramping  -     US pelvis complete w transvaginal; Future; Expected date: 01/22/2024  -     Magnesium; Future  -     Magnesium    2. Generalized abdominal pain  -     POCT urine dip auto non-scope    3. THALIA positive  -     THALIA w/Reflex if Positive; Future  -     THALIA w/Reflex if Positive    4. Screening for diabetes mellitus    5. Screening for deficiency anemia  -     CBC; Future  -     CBC    6. Screening for lipid disorders  -     Lipid Panel with Direct LDL reflex; Future  -     Lipid Panel with Direct LDL reflex    7. Screening for thyroid disorder  -     TSH, 3rd generation; Future  -     TSH, 3rd generation    8. Abnormal glucose  -     Comprehensive metabolic panel; Future  -     Hemoglobin A1C; Future  -     Comprehensive metabolic panel  -     Hemoglobin A1C    9. Need for hepatitis C screening test  -     Hepatitis C antibody; Future  -     Hepatitis C antibody    10. Mixed hyperlipidemia  -     Comprehensive metabolic panel; Future  -     Lipid Panel with Direct LDL reflex; Future  -     TSH, 3rd generation; Future  -     Comprehensive metabolic panel  -     Lipid Panel with Direct LDL reflex  -     TSH, 3rd generation    11. Elevated blood pressure reading  Assessment & Plan:  Low sodium diet  Avoid caffeine, nsaids, decongestants  Rpt BP outside office-call back w readings  Follow-up in office for rpt post testing          Depression Screening and Follow-up Plan: Patient was screened for depression during today's encounter. They screened negative with a PHQ-9 score of 1.    Subjective:      Patient ID: Ariana Hooker is a 53 y.o. female.    Chief Complaint   Patient presents with   • Abdominal Pain     Patient has not had her period since she 41, and she has had abdominal cramping for the last week , also lower back pain . Patient is losing weight and her hair is falling out        Pelvic Pain  The patient's primary symptoms include pelvic pain. The patient's pertinent  negatives include no genital itching, genital lesions, genital odor, genital rash, vaginal bleeding or vaginal discharge. This is a recurrent problem. The problem occurs intermittently. Associated symptoms include abdominal pain, constipation, diarrhea, joint pain, nausea and urgency. Pertinent negatives include no back pain, dysuria, fever, rash or vomiting. She is sexually active. No, her partner does not have an STD. She is postmenopausal.   Abdominal Pain  This is a recurrent problem. The pain is located in the generalized abdominal region. The quality of the pain is cramping and a sensation of fullness. The abdominal pain radiates to the pelvis. Associated symptoms include arthralgias, belching, constipation, diarrhea, flatus and nausea. Pertinent negatives include no dysuria, fever, melena or vomiting.   +wgt loss; +Hair loss    BP elevated--Denies CP, h/a, dizziness or focal weakness    The following portions of the patient's history were reviewed and updated as appropriate: allergies, current medications, past family history, past medical history, past social history, past surgical history and problem list.    Review of Systems   Constitutional:  Negative for fever.   Gastrointestinal:  Positive for abdominal pain, constipation, diarrhea, flatus and nausea. Negative for melena and vomiting.   Genitourinary:  Positive for pelvic pain and urgency. Negative for dysuria and vaginal discharge.   Musculoskeletal:  Positive for arthralgias and joint pain. Negative for back pain.   Skin:  Negative for rash.         Current Outpatient Medications   Medication Sig Dispense Refill   • dicyclomine (BENTYL) 10 mg capsule Take 1 capsule (10 mg total) by mouth 3 (three) times a day as needed (Abdominal cramping) 40 capsule 5   • gabapentin (NEURONTIN) 300 mg capsule Take 1 capsule (300 mg total) by mouth 3 (three) times a day 90 capsule 2   • methylPREDNISolone 4 MG tablet therapy pack Use as directed on package (Patient  "not taking: Reported on 2/21/2024) 21 each 0   • promethazine-dextromethorphan (PHENERGAN-DM) 6.25-15 mg/5 mL oral syrup Take 5 mL by mouth 4 (four) times a day as needed for cough (Patient not taking: Reported on 2/21/2024) 180 mL 0     No current facility-administered medications for this visit.       Objective:    BP (!) 144/102 (BP Location: Left arm, Patient Position: Sitting, Cuff Size: Large)   Pulse 63   Temp 97.8 °F (36.6 °C)   Resp 16   Ht 5' 2\" (1.575 m)   Wt 67.9 kg (149 lb 12.8 oz)   SpO2 98%   BMI 27.40 kg/m²        Physical Exam  Vitals and nursing note reviewed.   Constitutional:       General: She is not in acute distress.     Appearance: She is well-developed.   Eyes:      General: No scleral icterus.  Cardiovascular:      Rate and Rhythm: Normal rate and regular rhythm.   Pulmonary:      Effort: Pulmonary effort is normal. No respiratory distress.      Breath sounds: Normal breath sounds.   Abdominal:      General: Bowel sounds are normal.      Palpations: Abdomen is soft.      Tenderness: There is generalized abdominal tenderness.   Skin:     General: Skin is warm and dry.      Coloration: Skin is not jaundiced.   Neurological:      General: No focal deficit present.      Mental Status: She is alert and oriented to person, place, and time.   Psychiatric:         Mood and Affect: Mood is anxious.           Ashley Olivares MD  "

## 2024-01-23 LAB
ALBUMIN SERPL-MCNC: 4.2 G/DL (ref 3.8–4.9)
ALBUMIN/GLOB SERPL: 1.8 {RATIO} (ref 1.2–2.2)
ALP SERPL-CCNC: 60 IU/L (ref 44–121)
ALT SERPL-CCNC: 20 IU/L (ref 0–32)
ANA SER QL: NEGATIVE
AST SERPL-CCNC: 19 IU/L (ref 0–40)
BILIRUB SERPL-MCNC: 0.4 MG/DL (ref 0–1.2)
BUN SERPL-MCNC: 15 MG/DL (ref 6–24)
BUN/CREAT SERPL: 17 (ref 9–23)
CALCIUM SERPL-MCNC: 8.9 MG/DL (ref 8.7–10.2)
CHLORIDE SERPL-SCNC: 105 MMOL/L (ref 96–106)
CHOLEST SERPL-MCNC: 255 MG/DL (ref 100–199)
CO2 SERPL-SCNC: 24 MMOL/L (ref 20–29)
CREAT SERPL-MCNC: 0.86 MG/DL (ref 0.57–1)
EGFR: 81 ML/MIN/1.73
ERYTHROCYTE [DISTWIDTH] IN BLOOD BY AUTOMATED COUNT: 12.4 % (ref 11.7–15.4)
EST. AVERAGE GLUCOSE BLD GHB EST-MCNC: 114 MG/DL
GLOBULIN SER-MCNC: 2.4 G/DL (ref 1.5–4.5)
GLUCOSE SERPL-MCNC: 83 MG/DL (ref 70–99)
HBA1C MFR BLD: 5.6 % (ref 4.8–5.6)
HCT VFR BLD AUTO: 39.3 % (ref 34–46.6)
HCV AB S/CO SERPL IA: NON REACTIVE
HDLC SERPL-MCNC: 91 MG/DL
HGB BLD-MCNC: 13.1 G/DL (ref 11.1–15.9)
LDLC SERPL CALC-MCNC: 152 MG/DL (ref 0–99)
LDLC/HDLC SERPL: 1.7 RATIO (ref 0–3.2)
MAGNESIUM SERPL-MCNC: 2.4 MG/DL (ref 1.6–2.3)
MCH RBC QN AUTO: 30.1 PG (ref 26.6–33)
MCHC RBC AUTO-ENTMCNC: 33.3 G/DL (ref 31.5–35.7)
MCV RBC AUTO: 90 FL (ref 79–97)
MICRODELETION SYND BLD/T FISH: NORMAL
PLATELET # BLD AUTO: 294 X10E3/UL (ref 150–450)
POTASSIUM SERPL-SCNC: 4.3 MMOL/L (ref 3.5–5.2)
PROT SERPL-MCNC: 6.6 G/DL (ref 6–8.5)
RBC # BLD AUTO: 4.35 X10E6/UL (ref 3.77–5.28)
SL AMB VLDL CHOLESTEROL CALC: 12 MG/DL (ref 5–40)
SODIUM SERPL-SCNC: 140 MMOL/L (ref 134–144)
TRIGL SERPL-MCNC: 71 MG/DL (ref 0–149)
TSH SERPL DL<=0.005 MIU/L-ACNC: 4.23 UIU/ML (ref 0.45–4.5)
WBC # BLD AUTO: 4.4 X10E3/UL (ref 3.4–10.8)

## 2024-01-26 ENCOUNTER — HOSPITAL ENCOUNTER (OUTPATIENT)
Dept: RADIOLOGY | Facility: HOSPITAL | Age: 54
Discharge: HOME | End: 2024-01-26
Attending: FAMILY MEDICINE
Payer: COMMERCIAL

## 2024-01-26 DIAGNOSIS — R10.2 PELVIC CRAMPING: ICD-10-CM

## 2024-01-26 PROCEDURE — 76830 TRANSVAGINAL US NON-OB: CPT

## 2024-01-26 PROCEDURE — 76856 US EXAM PELVIC COMPLETE: CPT

## 2024-01-29 DIAGNOSIS — G89.4 CHRONIC PAIN SYNDROME: ICD-10-CM

## 2024-01-29 DIAGNOSIS — M46.1 SACROILIITIS (HCC): ICD-10-CM

## 2024-01-31 ENCOUNTER — TELEPHONE (OUTPATIENT)
Dept: FAMILY MEDICINE CLINIC | Facility: CLINIC | Age: 54
End: 2024-01-31

## 2024-01-31 RX ORDER — MELOXICAM 15 MG/1
15 TABLET ORAL DAILY
Qty: 30 TABLET | Refills: 2 | OUTPATIENT
Start: 2024-01-31

## 2024-01-31 RX ORDER — GABAPENTIN 300 MG/1
300 CAPSULE ORAL 3 TIMES DAILY
Qty: 90 CAPSULE | Refills: 2 | OUTPATIENT
Start: 2024-01-31

## 2024-01-31 NOTE — TELEPHONE ENCOUNTER
Patient texted us thru my chart , she is still in pain ibuprofen called reading room for US results , please advised when they are resulted the patient is very frustrated

## 2024-01-31 NOTE — TELEPHONE ENCOUNTER
The patient should contact our office first.   Additional Instructions: Standard Standard Counseling: I stressed the importance of daily sun protection with Vitamin D3 supplements when clinically appropriate, as well as regular self-examinations and skin and mucosal membranes. Call the office if new lesions of concern, or if they see signs of recurrence of previously treated malignancies. Detail Level: Detailed Melanoma In Situ Counseling: I stressed the importance of regular monthly self-examinations. They should examine the buttocks, gluteal cleft, genitalia and bottom of the feet with a hand held mirror.

## 2024-02-06 NOTE — TELEPHONE ENCOUNTER
Dr. Olivares:    Patient called stating that she is still experiencing pain and wanted to know what the next step is to find out what is causing this pain?  Please advise.

## 2024-02-07 ENCOUNTER — TELEMEDICINE (OUTPATIENT)
Dept: FAMILY MEDICINE CLINIC | Facility: CLINIC | Age: 54
End: 2024-02-07

## 2024-02-07 DIAGNOSIS — R10.84 GENERALIZED ABDOMINAL PAIN: Primary | ICD-10-CM

## 2024-02-07 DIAGNOSIS — R76.8 ANA POSITIVE: ICD-10-CM

## 2024-02-07 DIAGNOSIS — R10.2 PELVIC CRAMPING: ICD-10-CM

## 2024-02-07 DIAGNOSIS — T85.43XD RUPTURE OF IMPLANT OF RIGHT BREAST, SUBSEQUENT ENCOUNTER: ICD-10-CM

## 2024-02-07 DIAGNOSIS — R53.83 FATIGUE, UNSPECIFIED TYPE: ICD-10-CM

## 2024-02-09 ENCOUNTER — TELEPHONE (OUTPATIENT)
Dept: FAMILY MEDICINE CLINIC | Facility: CLINIC | Age: 54
End: 2024-02-09

## 2024-02-09 NOTE — TELEPHONE ENCOUNTER
LMTRC. Need infor on pharmacy for Dr Olivares to send rx for gabapentin and to give patient info on gyn and gastro  specialists- Dr Mendoza and Dr Roland.

## 2024-02-09 NOTE — TELEPHONE ENCOUNTER
----- Message from Betty Lowery RN sent at 2/9/2024 10:40 AM EST -----  Regarding: FW: Ultrasound  Contact: [phone]    ----- Message -----  From: Ariana Hooker  Sent: 2/9/2024   7:14 AM EST  To: [facility] Clinical  Subject: Ultrasound                                       Ok sounds good,yes I can not access referrals   And yes please send in gabapentin referral   I still have a appointment with you Monday 1230  I would like to keep that if ok

## 2024-02-12 ENCOUNTER — OFFICE VISIT (OUTPATIENT)
Dept: FAMILY MEDICINE CLINIC | Facility: CLINIC | Age: 54
End: 2024-02-12
Payer: COMMERCIAL

## 2024-02-12 VITALS
WEIGHT: 148 LBS | BODY MASS INDEX: 27.23 KG/M2 | SYSTOLIC BLOOD PRESSURE: 102 MMHG | OXYGEN SATURATION: 99 % | HEART RATE: 54 BPM | TEMPERATURE: 97.8 F | RESPIRATION RATE: 16 BRPM | HEIGHT: 62 IN | DIASTOLIC BLOOD PRESSURE: 78 MMHG

## 2024-02-12 DIAGNOSIS — M46.1 SACROILIITIS (HCC): ICD-10-CM

## 2024-02-12 DIAGNOSIS — T85.43XD RUPTURE OF IMPLANT OF RIGHT BREAST, SUBSEQUENT ENCOUNTER: ICD-10-CM

## 2024-02-12 DIAGNOSIS — R10.84 GENERALIZED ABDOMINAL PAIN: Primary | ICD-10-CM

## 2024-02-12 PROCEDURE — 99213 OFFICE O/P EST LOW 20 MIN: CPT | Performed by: FAMILY MEDICINE

## 2024-02-12 RX ORDER — GABAPENTIN 300 MG/1
300 CAPSULE ORAL 3 TIMES DAILY
Qty: 90 CAPSULE | Refills: 2 | Status: SHIPPED | OUTPATIENT
Start: 2024-02-12

## 2024-02-12 NOTE — TELEPHONE ENCOUNTER
Patient called back and was given info on specialists. Pharmacy is [facility].

## 2024-02-14 ENCOUNTER — TELEPHONE (OUTPATIENT)
Dept: FAMILY MEDICINE CLINIC | Facility: CLINIC | Age: 54
End: 2024-02-14

## 2024-02-14 NOTE — TELEPHONE ENCOUNTER
----- Message from Ashley Olivares MD sent at 2/14/2024  1:08 PM EST -----  Regarding: FW: Ultrasound  Contact: [phone]  Rx was already sent--Please confirm pt has referrals--I believe we gave them to her at follow-up visit.--thanks  ----- Message -----  From: Tere Leong MA  Sent: 2/12/2024   1:17 PM EST  To: Ashley Olivares MD  Subject: FW: Ultrasound                                     ----- Message -----  From: Betty Lowery RN  Sent: 2/12/2024  10:55 AM EST  To: [facility]  Subject: FW: Ultrasound                                     ----- Message -----  From: Ariana Hooker  Sent: 2/10/2024   8:53 AM EST  To: [facility]  Subject: Ultrasound                                       I use [facility] in [place]

## 2024-02-15 ENCOUNTER — TELEPHONE (OUTPATIENT)
Age: 54
End: 2024-02-15

## 2024-02-15 ENCOUNTER — TELEMEDICINE (OUTPATIENT)
Dept: FAMILY MEDICINE CLINIC | Facility: CLINIC | Age: 54
End: 2024-02-15
Payer: COMMERCIAL

## 2024-02-15 DIAGNOSIS — J01.00 ACUTE NON-RECURRENT MAXILLARY SINUSITIS: Primary | ICD-10-CM

## 2024-02-15 DIAGNOSIS — R05.1 ACUTE COUGH: ICD-10-CM

## 2024-02-15 DIAGNOSIS — R09.81 SINUS CONGESTION: ICD-10-CM

## 2024-02-15 DIAGNOSIS — G89.4 CHRONIC PAIN SYNDROME: ICD-10-CM

## 2024-02-15 PROCEDURE — 99214 OFFICE O/P EST MOD 30 MIN: CPT | Performed by: STUDENT IN AN ORGANIZED HEALTH CARE EDUCATION/TRAINING PROGRAM

## 2024-02-15 RX ORDER — AMOXICILLIN AND CLAVULANATE POTASSIUM 875; 125 MG/1; MG/1
1 TABLET, FILM COATED ORAL EVERY 12 HOURS SCHEDULED
Qty: 14 TABLET | Refills: 0 | Status: SHIPPED | OUTPATIENT
Start: 2024-02-15 | End: 2024-02-22

## 2024-02-15 RX ORDER — METHYLPREDNISOLONE 4 MG/1
TABLET ORAL
Qty: 21 EACH | Refills: 0 | Status: SHIPPED | OUTPATIENT
Start: 2024-02-15

## 2024-02-15 RX ORDER — DEXTROMETHORPHAN HYDROBROMIDE AND PROMETHAZINE HYDROCHLORIDE 15; 6.25 MG/5ML; MG/5ML
5 SYRUP ORAL 4 TIMES DAILY PRN
Qty: 180 ML | Refills: 0 | Status: SHIPPED | OUTPATIENT
Start: 2024-02-15

## 2024-02-15 NOTE — TELEPHONE ENCOUNTER
Patient called again, with bad migraine, needs something, called earlier this morning.  Please advise.

## 2024-02-15 NOTE — TELEPHONE ENCOUNTER
Sharon called, her  saw Dr. Castro a few days ago, and now she is sick with the same symptoms. She's severely congestion, very weak, has a lot of sinus pain/pressure, and has a bad sore throat. She did have a fever that broke, and body aches that have subsided.   She's wondering if something could be called in for her, she doesn't have enough energy to come in today and doesn't want to get anyone sick.   Please advise, pt aware Dr. Olivares is off today.

## 2024-02-15 NOTE — PROGRESS NOTES
Virtual Regular Visit    Verification of patient location:    Patient is located at Home in the following state in which I hold an active license [place]      Assessment/Plan:    Problem List Items Addressed This Visit        Other    Chronic pain syndrome   Other Visit Diagnoses     Acute non-recurrent maxillary sinusitis    -  Primary    Relevant Medications    amoxicillin-clavulanate (AUGMENTIN) 875-125 mg per tablet    Sinus congestion        Relevant Medications    methylPREDNISolone 4 MG tablet therapy pack    Acute cough        Relevant Medications    promethazine-dextromethorphan (PHENERGAN-DM) 6.25-15 mg/5 mL oral syrup        Patient is a pleasant 53-year-old female, symptoms consistent with upper respiratory tract infection, acute bacterial sinusitis recommend antibiotic therapy with steroid taper to relieve sinus congestion/inflammation.  Given acute cough will also give Phenergan DM.  If symptoms worsen or not improving reevaluation encouraged.         Reason for visit is   Chief Complaint   Patient presents with   • Virtual Regular Visit          Encounter provider Kishan Castro DO    Provider located at [facility]  [address] [facility] [address]      Recent Visits  Date Type Provider Dept   02/14/24 Telephone Kimberly Holder [facility]   02/12/24 Office Visit Ashley Olivares MD [facility]   02/09/24 Telephone Kimberly Holder [facility]   Showing recent visits within past 7 days and meeting all other requirements  Today's Visits  Date Type Provider Dept   02/15/24 Telemedicine Kishan Castro DO [facility]   Showing today's visits and meeting all other requirements  Future Appointments  No visits were found meeting these conditions.  Showing future appointments within next 150 days and meeting all other requirements       The patient was identified by name and date of birth. Ariana Hooker was informed that  this is a telemedicine visit and that the visit is being conducted through the Epic Embedded platform. She agrees to proceed..  My office door was closed. No one else was in the room.  She acknowledged consent and understanding of privacy and security of the video platform. The patient has agreed to participate and understands they can discontinue the visit at any time.    Patient is aware this is a billable service.     Subjective  Ariana Hooker is a 53 y.o. female presenting acute care visit secondary to upper respiratory tract infection symptoms that have not been improving with conservative therapy.        Past Medical History:   Diagnosis Date   • Allergic    • Anxiety    • Chronic hip pain, left    • GERD (gastroesophageal reflux disease)    • Visual impairment        Past Surgical History:   Procedure Laterality Date   • ANKLE ARTHROPLASTY     • ANKLE SURGERY     • AUGMENTATION MAMMAPLASTY Bilateral 2009    silicone   • BREAST CYST EXCISION Right     age 21's benign   • BREAST LUMPECTOMY     • BREAST SURGERY Bilateral     Enlargement   • EPIDURAL BLOCK INJECTION Left 03/05/2021    Procedure: left L5 transforaminal epidural steroid injection ( [id number]);  Surgeon: Kalyani Ansari MD;  Location: [facility] MAIN OR;  Service: Pain Management    • FL INJECTION LEFT ELBOW (NON ARTHROGRAM)  04/07/2022   • LAPAROSCOPY FOR ECTOPIC PREGNANCY      And Salpingectomy   • ORTHOPEDIC SURGERY  09/2018   • SC ARTHROCENTESIS ASPIR&/INJ MAJOR JT/BURSA W/O US Left 05/12/2023    Procedure: Iliopsoas bursitis of left hip  (27825 [id number]);  Surgeon: Kalyani Ansari MD;  Location: [facility] MAIN OR;  Service: Pain Management    • SC INJECT SI JOINT ARTHRGRPHY&/ANES/STEROID W/OKSANA Left 04/07/2022    Procedure: SACROILIAC joint injection ([id number] );  Surgeon: Kalyani Ansari MD;  Location: [facility] MAIN OR;  Service: Pain Management    • STEROID INJECTION HIP Left 04/16/2021    Procedure: Sacroiliac Joint Injection (23088);  Surgeon: Kalyani Ansari  MD;  Location: [facility] MAIN OR;  Service: Pain Management    • TOTAL HIP ARTHROPLASTY Left 09/2018       Current Outpatient Medications   Medication Sig Dispense Refill   • amoxicillin-clavulanate (AUGMENTIN) 875-125 mg per tablet Take 1 tablet by mouth every 12 (twelve) hours for 7 days 14 tablet 0   • methylPREDNISolone 4 MG tablet therapy pack Use as directed on package 21 each 0   • promethazine-dextromethorphan (PHENERGAN-DM) 6.25-15 mg/5 mL oral syrup Take 5 mL by mouth 4 (four) times a day as needed for cough 180 mL 0   • gabapentin (NEURONTIN) 300 mg capsule Take 1 capsule (300 mg total) by mouth 3 (three) times a day 90 capsule 2     No current facility-administered medications for this visit.        Allergies   Allergen Reactions   • Peach [Prunus Persica] Other (See Comments)     Closure of throat with peaches, apricots,plums , nectarine ,and almonds       Review of Systems   Constitutional:  Positive for fatigue. Negative for chills and fever.   HENT:  Positive for congestion and postnasal drip. Negative for sore throat.    Respiratory:  Positive for cough. Negative for shortness of breath and wheezing.    Cardiovascular:  Negative for chest pain, palpitations and leg swelling.   Neurological:  Positive for headaches. Negative for dizziness.       Video Exam      Physical Exam  Constitutional:       General: She is not in acute distress.  Eyes:      General: No scleral icterus.     Conjunctiva/sclera: Conjunctivae normal.   Pulmonary:      Effort: Pulmonary effort is normal. No respiratory distress.   Musculoskeletal:         General: No swelling. Normal range of motion.   Skin:     Coloration: Skin is not jaundiced.      Findings: No bruising.   Neurological:      General: No focal deficit present.      Mental Status: She is alert. Mental status is at baseline.   Psychiatric:         Mood and Affect: Mood normal.         Behavior: Behavior normal.          Visit Time  Total Visit Duration:  12  minutes

## 2024-02-16 NOTE — PROGRESS NOTES
Assessment/Plan:    1. Generalized abdominal pain  Assessment & Plan:  Etiology not yet clear  Abd and pelvic u/s wnl as well as recent labs  Ref to GI for further eval      2. Rupture of implant of right breast, subsequent encounter  Assessment & Plan:  Ref to breast care specialist given      3. Sacroiliitis (HCC)  -     gabapentin (NEURONTIN) 300 mg capsule; Take 1 capsule (300 mg total) by mouth 3 (three) times a day    4. BMI 27.0-27.9,adult        Subjective:      Patient ID: Ariana Hooker is a 53 y.o. female.    Chief Complaint   Patient presents with   • Follow-up     Bp check , need referral for surgeon in [place]        Abdominal Pain  This is a recurrent problem. The problem occurs intermittently. The pain is located in the generalized abdominal region. Associated symptoms include arthralgias and nausea. Pertinent negatives include no constipation, diarrhea, dysuria, fever, hematuria, melena or vomiting.   Abd and pelvic u/s wnl  Recent labs essentially wnl  Cologuard 8/2023 negative    BP wnl    The following portions of the patient's history were reviewed and updated as appropriate: allergies, current medications, past family history, past medical history, past social history, past surgical history and problem list.    Review of Systems   Constitutional:  Positive for fatigue and unexpected weight change. Negative for fever.   Eyes:         Dry eyes   Respiratory: Negative.     Cardiovascular: Negative.    Gastrointestinal:  Positive for abdominal pain and nausea. Negative for constipation, diarrhea, melena and vomiting.        Gerd   Genitourinary:  Negative for dysuria and hematuria.   Musculoskeletal:  Positive for arthralgias.   Psychiatric/Behavioral:  Positive for sleep disturbance.          Current Outpatient Medications   Medication Sig Dispense Refill   • gabapentin (NEURONTIN) 300 mg capsule Take 1 capsule (300 mg total) by mouth 3 (three) times a day 90 capsule 2   • amoxicillin-clavulanate  "(AUGMENTIN) 875-125 mg per tablet Take 1 tablet by mouth every 12 (twelve) hours for 7 days 14 tablet 0   • methylPREDNISolone 4 MG tablet therapy pack Use as directed on package 21 each 0   • promethazine-dextromethorphan (PHENERGAN-DM) 6.25-15 mg/5 mL oral syrup Take 5 mL by mouth 4 (four) times a day as needed for cough 180 mL 0     No current facility-administered medications for this visit.       Objective:    /78 (BP Location: Right arm, Patient Position: Sitting, Cuff Size: Large)   Pulse (!) 54   Temp 97.8 °F (36.6 °C)   Resp 16   Ht 5' 2\" (1.575 m)   Wt 67.1 kg (148 lb)   SpO2 99%   BMI 27.07 kg/m²        Physical Exam  Vitals and nursing note reviewed.   Constitutional:       General: She is not in acute distress.     Appearance: Normal appearance.   Eyes:      General: No scleral icterus.     Conjunctiva/sclera: Conjunctivae normal.   Cardiovascular:      Rate and Rhythm: Normal rate and regular rhythm.   Pulmonary:      Effort: Pulmonary effort is normal. No respiratory distress.      Breath sounds: Normal breath sounds.   Abdominal:      Palpations: Abdomen is soft.      Tenderness: There is abdominal tenderness (mild, diffuse). There is no right CVA tenderness, left CVA tenderness, guarding or rebound.   Skin:     General: Skin is warm and dry.      Coloration: Skin is not jaundiced.      Findings: No rash.   Neurological:      General: No focal deficit present.      Mental Status: She is alert and oriented to person, place, and time.      Cranial Nerves: No cranial nerve deficit.           Ashley Olivares MD  "

## 2024-02-21 ENCOUNTER — CONSULT (OUTPATIENT)
Dept: GASTROENTEROLOGY | Facility: CLINIC | Age: 54
End: 2024-02-21
Payer: COMMERCIAL

## 2024-02-21 VITALS
BODY MASS INDEX: 25.95 KG/M2 | HEART RATE: 98 BPM | HEIGHT: 62 IN | SYSTOLIC BLOOD PRESSURE: 150 MMHG | DIASTOLIC BLOOD PRESSURE: 94 MMHG | WEIGHT: 141 LBS | OXYGEN SATURATION: 98 %

## 2024-02-21 DIAGNOSIS — T85.43XD RUPTURE OF IMPLANT OF RIGHT BREAST, SUBSEQUENT ENCOUNTER: ICD-10-CM

## 2024-02-21 DIAGNOSIS — R10.84 GENERALIZED ABDOMINAL PAIN: Primary | ICD-10-CM

## 2024-02-21 DIAGNOSIS — Z12.11 SCREEN FOR COLON CANCER: ICD-10-CM

## 2024-02-21 DIAGNOSIS — R76.8 ANA POSITIVE: ICD-10-CM

## 2024-02-21 PROBLEM — Z11.59 NEED FOR HEPATITIS C SCREENING TEST: Status: RESOLVED | Noted: 2024-01-22 | Resolved: 2024-02-21

## 2024-02-21 PROBLEM — Z13.29 SCREENING FOR THYROID DISORDER: Status: RESOLVED | Noted: 2020-12-20 | Resolved: 2024-02-21

## 2024-02-21 PROCEDURE — 99204 OFFICE O/P NEW MOD 45 MIN: CPT | Performed by: PHYSICIAN ASSISTANT

## 2024-02-21 RX ORDER — DICYCLOMINE HYDROCHLORIDE 10 MG/1
10 CAPSULE ORAL 3 TIMES DAILY PRN
Qty: 40 CAPSULE | Refills: 5 | Status: SHIPPED | OUTPATIENT
Start: 2024-02-21

## 2024-02-21 NOTE — PROGRESS NOTES
[facility] - Outpatient Consultation  Ariana Hooker 53 y.o. female MRN: [id number]  Encounter: [id number]          ASSESSMENT AND PLAN:      1. Generalized abdominal pain  - trial of Dicyclomine  - get Celiac and CRP  - Plan EGD  - This risks of this procedure include but are not limited to; anesthesia complications, injury/perforation of the bowel, infection and bleeding.  -Pending patient course and procedures would consider CT scan    2. Screening colonoscopy  - no previous  - plan colonoscopy   - This risks of this procedure include but are not limited to; anesthesia complications, injury/perforation of the bowel, infection and bleeding.    ______________________________________________________________________    Chief Complaint: -Generalized abdominal pain    HPI: This is a 53-year-old white female new to our office with past medical history of anxiety, chronic pain syndrome, anxiety depression, herpes zoster, hyperlipidemia who presents with generalized abdominal pain.  She had a complete ultrasound on October 12, 2023 which was normal as well as a pelvic ultrasound on January 26, 2024 which again was also normal.    Had been having urgency and cramping.   Bowels more formed.   Recent sinus infection and treated with antibiotics (Augmentin, Zpack) and flu.   Cramping is biggest symptom.  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  No family history of colon cancer.  Father with history of bladder cancer    Abdominal pain  How long have you had the abdominal pain - about 6 weeks  Where is the pain located - B/L lower quadrant  How do you describe the pain - cramping, sharp  How often do you have the pain - daily  How long does the pain last - hours  Does anything make the pain worse - no   Does anything make the pain better - no  Any significant use of NSAIDS - No  Any change in bowels - Previously loose but more formed now  Any  black or bloody stools - No  Have you been seen by another Dr. for this pain - PCP on 2/15/24  Any recent imaging - complete ultrasound on October 12, 2023 which was normal as well as a pelvic ultrasound on January 26, 2024 which again was also normal.  Any previous EGD -no      Endoscopy History:  EGD -none previously  Colonoscopy -none previously   - Cologuard 8/17/23 but not done correctly    REVIEW OF SYSTEMS:    CONSTITUTIONAL: Denies any fever, chills, rigors, and weight loss.  HEENT: Denies hearing loss or visual disturbances.  CARDIOVASCULAR: No chest pain or palpitations.   RESPIRATORY: Denies any cough, hemoptysis, shortness of breath or dyspnea on exertion.  GASTROINTESTINAL: As noted in the History of Present Illness.   GENITOURINARY: No problems with urination. Denies any hematuria or dysuria.  NEUROLOGIC: No dizziness or vertigo, denies headaches.   MUSCULOSKELETAL: Denies any muscle or joint pain.   SKIN: Denies skin rashes or itching.   ENDOCRINE: Denies excessive thirst. Denies intolerance to heat or cold.  PSYCHOSOCIAL: Denies depression or anxiety. Denies any recent memory loss.       Historical Information   Past Medical History:   Diagnosis Date    Allergic     Anxiety     Chronic hip pain, left     Ectopic pregnancy     GERD (gastroesophageal reflux disease)     Visual impairment      Past Surgical History:   Procedure Laterality Date    ANKLE ARTHROPLASTY      ANKLE SURGERY      AUGMENTATION MAMMAPLASTY Bilateral 2009    silicone    BREAST CYST EXCISION Right     age 20's benign    BREAST LUMPECTOMY      BREAST SURGERY Bilateral     Enlargement    EPIDURAL BLOCK INJECTION Left 03/05/2021    Procedure: left L5 transforaminal epidural steroid injection ( 93202);  Surgeon: Kalyani Ansari MD;  Location: [facility] MAIN OR;  Service: Pain Management     FL INJECTION LEFT ELBOW (NON ARTHROGRAM)  04/07/2022    LAPAROSCOPY FOR ECTOPIC PREGNANCY      And Salpingectomy    ORTHOPEDIC SURGERY  09/2018    CT  ARTHROCENTESIS ASPIR&/INJ MAJOR JT/BURSA W/O US Left 05/12/2023    Procedure: Iliopsoas bursitis of left hip  ([id number]);  Surgeon: Kalyani Ansari MD;  Location: [facility] MAIN OR;  Service: Pain Management     [place] INJECT SI JOINT ARTHRGRPHY&/ANES/STEROID W/OKSANA Left 04/07/2022    Procedure: SACROILIAC joint injection ([id number] );  Surgeon: Kalyani Ansari MD;  Location: [facility] MAIN OR;  Service: Pain Management     STEROID INJECTION HIP Left 04/16/2021    Procedure: Sacroiliac Joint Injection (60852);  Surgeon: Kalyani Ansari MD;  Location: [facility] MAIN OR;  Service: Pain Management     TOTAL HIP ARTHROPLASTY Left 09/2018     Social History   Social History     Substance and Sexual Activity   Alcohol Use Yes    Comment: social drinker, per [employer]scripts     Social History     Substance and Sexual Activity   Drug Use Yes    Types: Marijuana    Comment: vape for pain     Social History     Tobacco Use   Smoking Status Former    Types: Cigarettes   Smokeless Tobacco Never   Tobacco Comments    Former smoker, per [employer]scripts     Family History   Problem Relation Age of Onset    Arthritis Mother     Emphysema Mother     COPD Mother     Cancer Father     Alcohol abuse Father         He has been clean for 2 years    Endometriosis Sister     No Known Problems Brother     No Known Problems Maternal Grandmother     No Known Problems Maternal Grandfather     No Known Problems Paternal Grandmother     No Known Problems Paternal Grandfather     Breast cancer Maternal Aunt 50    Cancer Maternal Aunt     No Known Problems Maternal Uncle     No Known Problems Paternal Aunt     No Known Problems Paternal Uncle        Meds/Allergies       Current Outpatient Medications:     amoxicillin-clavulanate (AUGMENTIN) 875-125 mg per tablet    dicyclomine (BENTYL) 10 mg capsule    gabapentin (NEURONTIN) 300 mg capsule    methylPREDNISolone 4 MG tablet therapy pack    promethazine-dextromethorphan (PHENERGAN-DM) 6.25-15 mg/5 mL oral  "syrup    Allergies   Allergen Reactions    Peach [Prunus Persica] Other (See Comments)     Closure of throat with peaches, apricots,plums , nectarine ,and almonds           Objective     Blood pressure 150/94, pulse 98, height 5' 2\" (1.575 m), weight 64 kg (141 lb), SpO2 98%, not currently breastfeeding. Body mass index is 25.79 kg/m².        PHYSICAL EXAM:      General Appearance:   Alert, cooperative, no distress, appears stated age   HEENT:   Normocephalic, atraumatic, anicteric.     Neck:  Supple, symmetrical   Lungs:   Clear to auscultation bilaterally; no rales, rhonchi or wheezing; respirations unlabored    Heart::   Regular rate and rhythm; no murmur, rub, or gallop.   Abdomen:   Generalized TTP, Soft,  non-distended; normal bowel sounds; no masses, no organomegaly    Genitalia:   Deferred    Rectal:   Deferred    Extremities:  No cyanosis, clubbing or edema    Pulses:  Not assessed   Skin:  No jaundice, rashes, or lesions    Lymph nodes:  Not assessed       Lab Results:   No visits with results within 1 Day(s) from this visit.   Latest known visit with results is:   Office Visit on 01/22/2024   Component Date Value     COLOR,UA 01/22/2024 yellow     CLARITY,UA 01/22/2024 clear     SPECIFIC GRAVITY,UA 01/22/2024 1.005      PH,UA 01/22/2024 7     LEUKOCYTE ESTERASE,UA 01/22/2024 neg     NITRITE,UA 01/22/2024 neg     GLUCOSE, UA 01/22/2024 neg     KETONES,UA 01/22/2024 neg     BILIRUBIN,UA 01/22/2024 neg     BLOOD,UA 01/22/2024 neg     POCT URINE PROTEIN 01/22/2024 neg     SL AMB POCT UROBILINOGEN 01/22/2024 neg     White Blood Cell Count 01/22/2024 4.4     Red Blood Cell Count 01/22/2024 4.35     Hemoglobin 01/22/2024 13.1     HCT 01/22/2024 39.3     MCV 01/22/2024 90     MCH 01/22/2024 30.1     MCHC 01/22/2024 33.3     RDW 01/22/2024 12.4     Platelet Count 01/22/2024 294     Glucose, Random 01/22/2024 83     BUN 01/22/2024 15     Creatinine 01/22/2024 0.86     eGFR 01/22/2024 81     SL AMB BUN/CREATININE " RA* 01/22/2024 17     Sodium 01/22/2024 140     Potassium 01/22/2024 4.3     Chloride 01/22/2024 105     CO2 01/22/2024 24     CALCIUM 01/22/2024 8.9     Protein, Total 01/22/2024 6.6     Albumin 01/22/2024 4.2     Globulin, Total 01/22/2024 2.4     Albumin/Globulin Ratio 01/22/2024 1.8     TOTAL BILIRUBIN 01/22/2024 0.4     Alk Phos Isoenzymes 01/22/2024 60     AST 01/22/2024 19     ALT 01/22/2024 20     HEP C AB 01/22/2024 Non Reactive     Hemoglobin A1C 01/22/2024 5.6     Estimated Average Glucose 01/22/2024 114     Cholesterol, Total 01/22/2024 255 (H)     Triglycerides 01/22/2024 71     HDL 01/22/2024 91     VLDL Cholesterol Calcula* 01/22/2024 12     LDL Calculated 01/22/2024 152 (H)     LDl/HDL Ratio 01/22/2024 1.7     Magnesium, Serum 01/22/2024 2.4 (H)     TSH 01/22/2024 4.230     THALIA 01/22/2024 Negative     Interpretation 01/22/2024 Note          Radiology Results:   US pelvis complete w transvaginal    Result Date: 1/31/2024  Narrative: PELVIC ULTRASOUND, COMPLETE INDICATION: The patient is 53 years old. R10.2: Pelvic and perineal pain. COMPARISON: None TECHNIQUE: Transabdominal pelvic ultrasound was performed in sagittal and transverse planes with a curvilinear transducer. Additional transvaginal imaging was performed to better evaluate the endometrium and ovaries. Imaging included volumetric sweeps as  well as traditional still imaging technique. FINDINGS: UTERUS: The uterus is anteverted in position, measuring 6.0 x 1.9 x 3.7 cm. The uterus has a normal contour and echotexture. The cervix appears within normal limits. ENDOMETRIUM: The endometrial echo complex has an AP caliber of 2.0 mm. Its appearance is within normal limits for age and cycle and shows no filling defects. Trace simple free fluid within the endometrial cavity. OVARIES/ADNEXA: Right ovary: 1.7 x 1.5 x 0.8 cm. 1.0 mL. Left ovary: 2.3 x 1.6 x 2.1 cm. 4.0 mL. Ovarian Doppler flow is within normal limits. No suspicious ovarian or adnexal  abnormality. OTHER: No free fluid or loculated fluid collections.     Impression: Essentially normal pelvic ultrasound. Workstation performed: [id number]       Johnson Rodriguez PA-C

## 2024-02-21 NOTE — PATIENT INSTRUCTIONS
Scheduled date of EGD/colonoscopy (as of today): 3/15/24  Physician performing EGD/colonoscopy: Luan  Location of EGD/colonoscopy: [facility]  Desired bowel prep reviewed with patient: Miralax/Dulcolax  Instructions reviewed with patient by: Valentina ESPINOSA  Clearances: n/a

## 2024-02-21 NOTE — LETTER
February 21, 2024     Ashley Olivares MD  [address] [facility] [address]    Patient: Ariana Hooker   YOB: 1970   Date of Visit: 2/21/2024       Dear Dr. Olivares:    Thank you for referring Ariana Hooker to me for evaluation. Below are my notes for this consultation.    If you have questions, please do not hesitate to call me. I look forward to following your patient along with you.         Sincerely,        Johnson Rodriguez PA-C        CC: No Recipients    Johnson Rodriguez PA-C  2/21/2024  9:05 AM  Incomplete  [facility] - Outpatient Consultation  Ariana Hooker 53 y.o. female MRN: [id number]  Encounter: [id number]          ASSESSMENT AND PLAN:      1. Generalized abdominal pain  - trial of Dicyclomine  - get Celiac and CRP  - Plan EGD  - This risks of this procedure include but are not limited to; anesthesia complications, injury/perforation of the bowel, infection and bleeding.      2. Screening colonoscopy  - no previous  - plan colonoscopy   - This risks of this procedure include but are not limited to; anesthesia complications, injury/perforation of the bowel, infection and bleeding.    ______________________________________________________________________    Chief Complaint: -Generalized abdominal pain    HPI: This is a 53-year-old white female new to our office with past medical history of anxiety, chronic pain syndrome, anxiety depression, herpes zoster, hyperlipidemia who presents with generalized abdominal pain.  She had a complete ultrasound on October 12, 2023 which was normal as well as a pelvic ultrasound on January 26, 2024 which again was also normal.    Had been having urgency and cramping.   Bowels more formed.   Recent sinus infection and treated with antibiotics (Augmentin, Zpack) and flu.   Cramping is biggest symptom.    Endoscopy History:  EGD -none previously  Colonoscopy -none previously   - Cologuard 8/17/23 but  not done correctly    REVIEW OF SYSTEMS:    CONSTITUTIONAL: Denies any fever, chills, rigors, and weight loss.  HEENT: Denies hearing loss or visual disturbances.  CARDIOVASCULAR: No chest pain or palpitations.   RESPIRATORY: Denies any cough, hemoptysis, shortness of breath or dyspnea on exertion.  GASTROINTESTINAL: As noted in the History of Present Illness.   GENITOURINARY: No problems with urination. Denies any hematuria or dysuria.  NEUROLOGIC: No dizziness or vertigo, denies headaches.   MUSCULOSKELETAL: Denies any muscle or joint pain.   SKIN: Denies skin rashes or itching.   ENDOCRINE: Denies excessive thirst. Denies intolerance to heat or cold.  PSYCHOSOCIAL: Denies depression or anxiety. Denies any recent memory loss.       Historical Information  Past Medical History:   Diagnosis Date   • Allergic    • Anxiety    • Chronic hip pain, left    • Ectopic pregnancy    • GERD (gastroesophageal reflux disease)    • Visual impairment      Past Surgical History:   Procedure Laterality Date   • ANKLE ARTHROPLASTY     • ANKLE SURGERY     • AUGMENTATION MAMMAPLASTY Bilateral 2009    silicone   • BREAST CYST EXCISION Right     age 21's benign   • BREAST LUMPECTOMY     • BREAST SURGERY Bilateral     Enlargement   • EPIDURAL BLOCK INJECTION Left 03/05/2021    Procedure: left L5 transforaminal epidural steroid injection ( 33526);  Surgeon: Kalyani Ansari MD;  Location: [facility] MAIN OR;  Service: Pain Management    • FL INJECTION LEFT ELBOW (NON ARTHROGRAM)  04/07/2022   • LAPAROSCOPY FOR ECTOPIC PREGNANCY      And Salpingectomy   • ORTHOPEDIC SURGERY  09/2018   • NV ARTHROCENTESIS ASPIR&/INJ MAJOR JT/BURSA W/O US Left 05/12/2023    Procedure: Iliopsoas bursitis of left hip  ([id number] [id number]);  Surgeon: Kalyani Ansari MD;  Location: [facility] MAIN OR;  Service: Pain Management    • NV INJECT SI JOINT ARTHRGRPHY&/ANES/STEROID W/OKSANA Left 04/07/2022    Procedure: SACROILIAC joint injection ([id number] );  Surgeon: Kalyani Ansari MD;  " Location: [facility] MAIN OR;  Service: Pain Management    • STEROID INJECTION HIP Left 04/16/2021    Procedure: Sacroiliac Joint Injection ([id number]);  Surgeon: Kalyani Ansari MD;  Location: [facility] MAIN OR;  Service: Pain Management    • TOTAL HIP ARTHROPLASTY Left 09/2018     Social History  Social History     Substance and Sexual Activity   Alcohol Use Yes    Comment: social drinker, per allscripts     Social History     Substance and Sexual Activity   Drug Use Yes   • Types: Marijuana    Comment: vape for pain     Social History     Tobacco Use   Smoking Status Former   • Types: Cigarettes   Smokeless Tobacco Never   Tobacco Comments    Former smoker, per allscripts     Family History   Problem Relation Age of Onset   • Arthritis Mother    • Emphysema Mother    • COPD Mother    • Cancer Father    • Alcohol abuse Father         He has been clean for 2 years   • Endometriosis Sister    • No Known Problems Brother    • No Known Problems Maternal Grandmother    • No Known Problems Maternal Grandfather    • No Known Problems Paternal Grandmother    • No Known Problems Paternal Grandfather    • Breast cancer Maternal Aunt 50   • Cancer Maternal Aunt    • No Known Problems Maternal Uncle    • No Known Problems Paternal Aunt    • No Known Problems Paternal Uncle        Meds/Allergies      Current Outpatient Medications:   •  amoxicillin-clavulanate (AUGMENTIN) 875-125 mg per tablet  •  gabapentin (NEURONTIN) 300 mg capsule  •  methylPREDNISolone 4 MG tablet therapy pack  •  promethazine-dextromethorphan (PHENERGAN-DM) 6.25-15 mg/5 mL oral syrup    Allergies   Allergen Reactions   • Peach [Prunus Persica] Other (See Comments)     Closure of throat with peaches, apricots,plums , nectarine ,and almonds           Objective    Blood pressure 150/94, pulse 98, height 5' 2\" (1.575 m), weight 64 kg (141 lb), SpO2 98%, not currently breastfeeding. Body mass index is 25.79 kg/m².        PHYSICAL EXAM:      General Appearance:   Alert, " cooperative, no distress, appears stated age   HEENT:   Normocephalic, atraumatic, anicteric.     Neck:  Supple, symmetrical   Lungs:   Clear to auscultation bilaterally; no rales, rhonchi or wheezing; respirations unlabored    Heart::   Regular rate and rhythm; no murmur, rub, or gallop.   Abdomen:   Generalized TTP, Soft,  non-distended; normal bowel sounds; no masses, no organomegaly    Genitalia:   Deferred    Rectal:   Deferred    Extremities:  No cyanosis, clubbing or edema    Pulses:  Not assessed   Skin:  No jaundice, rashes, or lesions    Lymph nodes:  Not assessed       Lab Results:   No visits with results within 1 Day(s) from this visit.   Latest known visit with results is:   Office Visit on 01/22/2024   Component Date Value   •  COLOR,UA 01/22/2024 yellow    • CLARITY,UA 01/22/2024 clear    • SPECIFIC GRAVITY,UA 01/22/2024 1.005    •  PH,UA 01/22/2024 7    • LEUKOCYTE ESTERASE,UA 01/22/2024 neg    • NITRITE,UA 01/22/2024 neg    • GLUCOSE, UA 01/22/2024 neg    • KETONES,UA 01/22/2024 neg    • BILIRUBIN,UA 01/22/2024 neg    • BLOOD,UA 01/22/2024 neg    • POCT URINE PROTEIN 01/22/2024 neg    • SL AMB POCT UROBILINOGEN 01/22/2024 neg    • White Blood Cell Count 01/22/2024 4.4    • Red Blood Cell Count 01/22/2024 4.35    • Hemoglobin 01/22/2024 13.1    • HCT 01/22/2024 39.3    • MCV 01/22/2024 90    • MCH 01/22/2024 30.1    • MCHC 01/22/2024 33.3    • RDW 01/22/2024 12.4    • Platelet Count 01/22/2024 294    • Glucose, Random 01/22/2024 83    • BUN 01/22/2024 15    • Creatinine 01/22/2024 0.86    • eGFR 01/22/2024 81    • SL AMB BUN/CREATININE RA* 01/22/2024 17    • Sodium 01/22/2024 140    • Potassium 01/22/2024 4.3    • Chloride 01/22/2024 105    • CO2 01/22/2024 24    • CALCIUM 01/22/2024 8.9    • Protein, Total 01/22/2024 6.6    • Albumin 01/22/2024 4.2    • Globulin, Total 01/22/2024 2.4    • Albumin/Globulin Ratio 01/22/2024 1.8    • TOTAL BILIRUBIN 01/22/2024 0.4    • Alk Phos Isoenzymes 01/22/2024  60    • AST 01/22/2024 19    • ALT 01/22/2024 20    • HEP C AB 01/22/2024 Non Reactive    • Hemoglobin A1C 01/22/2024 5.6    • Estimated Average Glucose 01/22/2024 114    • Cholesterol, Total 01/22/2024 255 (H)    • Triglycerides 01/22/2024 71    • HDL 01/22/2024 91    • VLDL Cholesterol Calcula* 01/22/2024 12    • LDL Calculated 01/22/2024 152 (H)    • LDl/HDL Ratio 01/22/2024 1.7    • Magnesium, Serum 01/22/2024 2.4 (H)    • TSH 01/22/2024 4.230    • THALIA 01/22/2024 Negative    • Interpretation 01/22/2024 Note          Radiology Results:   US pelvis complete w transvaginal    Result Date: 1/31/2024  Narrative: PELVIC ULTRASOUND, COMPLETE INDICATION: The patient is 53 years old. R10.2: Pelvic and perineal pain. COMPARISON: None TECHNIQUE: Transabdominal pelvic ultrasound was performed in sagittal and transverse planes with a curvilinear transducer. Additional transvaginal imaging was performed to better evaluate the endometrium and ovaries. Imaging included volumetric sweeps as  well as traditional still imaging technique. FINDINGS: UTERUS: The uterus is anteverted in position, measuring 6.0 x 1.9 x 3.7 cm. The uterus has a normal contour and echotexture. The cervix appears within normal limits. ENDOMETRIUM: The endometrial echo complex has an AP caliber of 2.0 mm. Its appearance is within normal limits for age and cycle and shows no filling defects. Trace simple free fluid within the endometrial cavity. OVARIES/ADNEXA: Right ovary: 1.7 x 1.5 x 0.8 cm. 1.0 mL. Left ovary: 2.3 x 1.6 x 2.1 cm. 4.0 mL. Ovarian Doppler flow is within normal limits. No suspicious ovarian or adnexal abnormality. OTHER: No free fluid or loculated fluid collections.     Impression: Essentially normal pelvic ultrasound. Workstation performed: [id number]       Johnson Rodriguez PA-C  2/21/2024  8:00 AM  Sign when Signing Visit  [facility] - Outpatient Consultation  Ariana  Nhung 53 y.o. female MRN: [id number]  Encounter: [id number]          ASSESSMENT AND PLAN:      1. Generalized abdominal pain  -   2. Screening colonoscopy  -   ______________________________________________________________________    Chief Complaint: -Generalized abdominal pain    HPI: This is a 53-year-old white female new to our office with past medical history of anxiety, chronic pain syndrome, anxiety depression, herpes zoster, hyperlipidemia who presents with generalized abdominal pain.  She had a complete ultrasound on October 12, 2023 which was normal as well as a pelvic ultrasound on January 26, 2024 which again was also normal.    Endoscopy History:  EGD -none previously  Colonoscopy -none previously    REVIEW OF SYSTEMS:    CONSTITUTIONAL: Denies any fever, chills, rigors, and weight loss.  HEENT: Denies hearing loss or visual disturbances.  CARDIOVASCULAR: No chest pain or palpitations.   RESPIRATORY: Denies any cough, hemoptysis, shortness of breath or dyspnea on exertion.  GASTROINTESTINAL: As noted in the History of Present Illness.   GENITOURINARY: No problems with urination. Denies any hematuria or dysuria.  NEUROLOGIC: No dizziness or vertigo, denies headaches.   MUSCULOSKELETAL: Denies any muscle or joint pain.   SKIN: Denies skin rashes or itching.   ENDOCRINE: Denies excessive thirst. Denies intolerance to heat or cold.  PSYCHOSOCIAL: Denies depression or anxiety. Denies any recent memory loss.       Historical Information  Past Medical History:   Diagnosis Date   • Allergic    • Anxiety    • Chronic hip pain, left    • GERD (gastroesophageal reflux disease)    • Visual impairment      Past Surgical History:   Procedure Laterality Date   • ANKLE ARTHROPLASTY     • ANKLE SURGERY     • AUGMENTATION MAMMAPLASTY Bilateral 2009    silicone   • BREAST CYST EXCISION Right     age 20's benign   • BREAST LUMPECTOMY     • BREAST SURGERY Bilateral     Enlargement   • EPIDURAL BLOCK INJECTION Left  03/05/2021    Procedure: left L5 transforaminal epidural steroid injection ( [id number]);  Surgeon: Kalyani Ansari MD;  Location: [facility] MAIN OR;  Service: Pain Management    • FL INJECTION LEFT ELBOW (NON ARTHROGRAM)  04/07/2022   • LAPAROSCOPY FOR ECTOPIC PREGNANCY      And Salpingectomy   • ORTHOPEDIC SURGERY  09/2018   • WI ARTHROCENTESIS ASPIR&/INJ MAJOR JT/BURSA W/O US Left 05/12/2023    Procedure: Iliopsoas bursitis of left hip  (76362 [id number]);  Surgeon: Kalyani Ansari MD;  Location: [facility] MAIN OR;  Service: Pain Management    • WI INJECT SI JOINT ARTHRGRPHY&/ANES/STEROID W/OKSANA Left 04/07/2022    Procedure: SACROILIAC joint injection (47750 );  Surgeon: Kalyani Ansari MD;  Location: [facility] MAIN OR;  Service: Pain Management    • STEROID INJECTION HIP Left 04/16/2021    Procedure: Sacroiliac Joint Injection (44542);  Surgeon: Kalyani Ansari MD;  Location: [facility] MAIN OR;  Service: Pain Management    • TOTAL HIP ARTHROPLASTY Left 09/2018     Social History  Social History     Substance and Sexual Activity   Alcohol Use No    Comment: social drinker, per allscripts     Social History     Substance and Sexual Activity   Drug Use No     Social History     Tobacco Use   Smoking Status Never   Smokeless Tobacco Never   Tobacco Comments    Former smoker, per allscripts     Family History   Problem Relation Age of Onset   • Arthritis Mother    • Emphysema Mother    • COPD Mother    • Alcohol abuse Father         He has been clean for 2 years   • No Known Problems Sister    • No Known Problems Maternal Grandmother    • No Known Problems Maternal Grandfather    • No Known Problems Paternal Grandmother    • No Known Problems Paternal Grandfather    • No Known Problems Brother    • Breast cancer Maternal Aunt 50   • Cancer Maternal Aunt    • No Known Problems Maternal Uncle    • No Known Problems Paternal Aunt    • No Known Problems Paternal Uncle        Meds/Allergies      Current Outpatient Medications:   •   amoxicillin-clavulanate (AUGMENTIN) 875-125 mg per tablet  •  gabapentin (NEURONTIN) 300 mg capsule  •  methylPREDNISolone 4 MG tablet therapy pack  •  promethazine-dextromethorphan (PHENERGAN-DM) 6.25-15 mg/5 mL oral syrup    Allergies   Allergen Reactions   • Peach [Prunus Persica] Other (See Comments)     Closure of throat with peaches, apricots,plums , nectarine ,and almonds           Objective    not currently breastfeeding. There is no height or weight on file to calculate BMI.        PHYSICAL EXAM:      General Appearance:   Alert, cooperative, no distress, appears stated age   HEENT:   Normocephalic, atraumatic, anicteric.     Neck:  Supple, symmetrical   Lungs:   Clear to auscultation bilaterally; no rales, rhonchi or wheezing; respirations unlabored    Heart::   Regular rate and rhythm; no murmur, rub, or gallop.   Abdomen:   Soft, non-tender, non-distended; normal bowel sounds; no masses, no organomegaly    Genitalia:   Deferred    Rectal:   Deferred    Extremities:  No cyanosis, clubbing or edema    Pulses:  Not assessed   Skin:  No jaundice, rashes, or lesions    Lymph nodes:  Not assessed       Lab Results:   No visits with results within 1 Day(s) from this visit.   Latest known visit with results is:   Office Visit on 01/22/2024   Component Date Value   •  COLOR,UA 01/22/2024 yellow    • CLARITY,UA 01/22/2024 clear    • SPECIFIC GRAVITY,UA 01/22/2024 1.005    •  PH,UA 01/22/2024 7    • LEUKOCYTE ESTERASE,UA 01/22/2024 neg    • NITRITE,UA 01/22/2024 neg    • GLUCOSE, UA 01/22/2024 neg    • KETONES,UA 01/22/2024 neg    • BILIRUBIN,UA 01/22/2024 neg    • BLOOD,UA 01/22/2024 neg    • POCT URINE PROTEIN 01/22/2024 neg    • SL AMB POCT UROBILINOGEN 01/22/2024 neg    • White Blood Cell Count 01/22/2024 4.4    • Red Blood Cell Count 01/22/2024 4.35    • Hemoglobin 01/22/2024 13.1    • HCT 01/22/2024 39.3    • MCV 01/22/2024 90    • MCH 01/22/2024 30.1    • MCHC 01/22/2024 33.3    • RDW 01/22/2024 12.4    •  Platelet Count 01/22/2024 294    • Glucose, Random 01/22/2024 83    • BUN 01/22/2024 15    • Creatinine 01/22/2024 0.86    • eGFR 01/22/2024 81    • SL AMB BUN/CREATININE RA* 01/22/2024 17    • Sodium 01/22/2024 140    • Potassium 01/22/2024 4.3    • Chloride 01/22/2024 105    • CO2 01/22/2024 24    • CALCIUM 01/22/2024 8.9    • Protein, Total 01/22/2024 6.6    • Albumin 01/22/2024 4.2    • Globulin, Total 01/22/2024 2.4    • Albumin/Globulin Ratio 01/22/2024 1.8    • TOTAL BILIRUBIN 01/22/2024 0.4    • Alk Phos Isoenzymes 01/22/2024 60    • AST 01/22/2024 19    • ALT 01/22/2024 20    • HEP C AB 01/22/2024 Non Reactive    • Hemoglobin A1C 01/22/2024 5.6    • Estimated Average Glucose 01/22/2024 114    • Cholesterol, Total 01/22/2024 255 (H)    • Triglycerides 01/22/2024 71    • HDL 01/22/2024 91    • VLDL Cholesterol Calcula* 01/22/2024 12    • LDL Calculated 01/22/2024 152 (H)    • LDl/HDL Ratio 01/22/2024 1.7    • Magnesium, Serum 01/22/2024 2.4 (H)    • TSH 01/22/2024 4.230    • THALIA 01/22/2024 Negative    • Interpretation 01/22/2024 Note          Radiology Results:   US pelvis complete w transvaginal    Result Date: 1/31/2024  Narrative: PELVIC ULTRASOUND, COMPLETE INDICATION: The patient is 53 years old. R10.2: Pelvic and perineal pain. COMPARISON: None TECHNIQUE: Transabdominal pelvic ultrasound was performed in sagittal and transverse planes with a curvilinear transducer. Additional transvaginal imaging was performed to better evaluate the endometrium and ovaries. Imaging included volumetric sweeps as  well as traditional still imaging technique. FINDINGS: UTERUS: The uterus is anteverted in position, measuring 6.0 x 1.9 x 3.7 cm. The uterus has a normal contour and echotexture. The cervix appears within normal limits. ENDOMETRIUM: The endometrial echo complex has an AP caliber of 2.0 mm. Its appearance is within normal limits for age and cycle and shows no filling defects. Trace simple free fluid within the  endometrial cavity. OVARIES/ADNEXA: Right ovary: 1.7 x 1.5 x 0.8 cm. 1.0 mL. Left ovary: 2.3 x 1.6 x 2.1 cm. 4.0 mL. Ovarian Doppler flow is within normal limits. No suspicious ovarian or adnexal abnormality. OTHER: No free fluid or loculated fluid collections.     Impression: Essentially normal pelvic ultrasound. Workstation performed: [id number]       Johnson Rodriguez PA-C

## 2024-02-22 LAB
CRP SERPL-MCNC: 2 MG/L (ref 0–10)
ENDOMYSIUM IGA SER QL: NEGATIVE
GLIADIN PEPTIDE IGA SER-ACNC: 8 UNITS (ref 0–19)
GLIADIN PEPTIDE IGG SER-ACNC: 5 UNITS (ref 0–19)
IGA SERPL-MCNC: 395 MG/DL (ref 87–352)
TTG IGA SER-ACNC: <2 U/ML (ref 0–3)
TTG IGG SER-ACNC: <2 U/ML (ref 0–5)

## 2024-02-25 PROBLEM — R03.0 ELEVATED BLOOD PRESSURE READING: Status: ACTIVE | Noted: 2024-02-25

## 2024-02-25 NOTE — ASSESSMENT & PLAN NOTE
Low sodium diet  Avoid caffeine, nsaids, decongestants  Rpt BP outside office-call back w readings  Follow-up in office for rpt post testing

## 2024-02-26 DIAGNOSIS — R10.30 LOWER ABDOMINAL PAIN: Primary | ICD-10-CM

## 2024-02-26 DIAGNOSIS — R19.4 CHANGE IN BOWEL HABIT: ICD-10-CM

## 2024-02-29 VITALS — SYSTOLIC BLOOD PRESSURE: 130 MMHG | DIASTOLIC BLOOD PRESSURE: 82 MMHG

## 2024-03-01 ENCOUNTER — ANESTHESIA EVENT (OUTPATIENT)
Dept: ANESTHESIOLOGY | Facility: HOSPITAL | Age: 54
End: 2024-03-01

## 2024-03-01 ENCOUNTER — ANESTHESIA (OUTPATIENT)
Dept: ANESTHESIOLOGY | Facility: HOSPITAL | Age: 54
End: 2024-03-01

## 2024-03-01 NOTE — PROGRESS NOTES
Assessment/Plan:  Diagnoses and all orders for this visit:    Generalized abdominal pain  Comments:  past abd and pelvic u/s both essentially wnl  await further gi recommendations    Fatigue, unspecified type    BMI 25.0-25.9,adult        Subjective:      Patient ID: Ariana Hooker is a 53 y.o. female.    Chief Complaint   Patient presents with   • Abdominal Cramping     Cramping for 3 weeks she's been in menopause for 12 years        Abdominal Cramping  This is a recurrent problem. The problem occurs intermittently. The pain is located in the generalized abdominal region. The quality of the pain is cramping and a sensation of fullness. Associated symptoms include arthralgias, belching and nausea. Pertinent negatives include no fever or melena. She has tried H2 blockers, acetaminophen, antacids and proton pump inhibitors for the symptoms.     BP in range  Denies CP, v/d or melena    The following portions of the patient's history were reviewed and updated as appropriate: allergies, current medications, past family history, past medical history, past social history, past surgical history and problem list.    Review of Systems   Constitutional:  Negative for fever.   Gastrointestinal:  Positive for nausea. Negative for melena.   Musculoskeletal:  Positive for arthralgias.         Current Outpatient Medications   Medication Sig Dispense Refill   • dicyclomine (BENTYL) 10 mg capsule Take 1 capsule (10 mg total) by mouth 3 (three) times a day as needed (Abdominal cramping) 40 capsule 5   • gabapentin (NEURONTIN) 300 mg capsule Take 1 capsule (300 mg total) by mouth 3 (three) times a day 90 capsule 2   • methylPREDNISolone 4 MG tablet therapy pack Use as directed on package (Patient not taking: Reported on 2/21/2024) 21 each 0   • promethazine-dextromethorphan (PHENERGAN-DM) 6.25-15 mg/5 mL oral syrup Take 5 mL by mouth 4 (four) times a day as needed for cough (Patient not taking: Reported on 2/21/2024) 180 mL 0     No  current facility-administered medications for this visit.       Objective:    /82        Physical Exam  Vitals and nursing note reviewed.   Constitutional:       General: She is not in acute distress.     Appearance: She is well-developed.   Eyes:      General: No scleral icterus.  Cardiovascular:      Rate and Rhythm: Normal rate and regular rhythm.   Pulmonary:      Effort: Pulmonary effort is normal. No respiratory distress.      Breath sounds: Normal breath sounds.   Abdominal:      General: Bowel sounds are normal.      Palpations: Abdomen is soft.      Tenderness: There is abdominal tenderness (mild).   Skin:     General: Skin is warm and dry.      Coloration: Skin is not jaundiced.   Neurological:      General: No focal deficit present.      Mental Status: She is alert and oriented to person, place, and time.   Psychiatric:         Mood and Affect: Mood is anxious.           Ashley Olivares MD

## 2024-03-05 ENCOUNTER — HOSPITAL ENCOUNTER (OUTPATIENT)
Dept: RADIOLOGY | Facility: HOSPITAL | Age: 54
Discharge: HOME | End: 2024-03-05
Payer: COMMERCIAL

## 2024-03-05 DIAGNOSIS — R10.30 LOWER ABDOMINAL PAIN: ICD-10-CM

## 2024-03-05 DIAGNOSIS — R19.4 CHANGE IN BOWEL HABIT: ICD-10-CM

## 2024-03-05 PROCEDURE — 74177 CT ABD & PELVIS W/CONTRAST: CPT

## 2024-03-05 RX ADMIN — IOHEXOL 100 ML: 350 INJECTION, SOLUTION INTRAVENOUS at 08:16

## 2024-03-07 ENCOUNTER — NURSE TRIAGE (OUTPATIENT)
Age: 54
End: 2024-03-07

## 2024-03-07 ENCOUNTER — TELEPHONE (OUTPATIENT)
Age: 54
End: 2024-03-07

## 2024-03-07 NOTE — TELEPHONE ENCOUNTER
"Suzette called from  radiology noting significant finding on CT abdomen/pelvis.      Answer Assessment - Initial Assessment Questions  1. REASON FOR CALL or QUESTION: \"What is your reason for calling today?\" or \"How can I best help you?\" or \"What question do you have that I can help answer?\"      Radiology dept called with significant finding.    Protocols used: Information Only Call - No Triage-ADULT-[place]    "

## 2024-03-15 ENCOUNTER — HOSPITAL ENCOUNTER (OUTPATIENT)
Dept: GASTROENTEROLOGY | Facility: AMBULARY SURGERY CENTER | Age: 54
Setting detail: OUTPATIENT SURGERY
End: 2024-03-15
Attending: INTERNAL MEDICINE
Payer: COMMERCIAL

## 2024-03-15 ENCOUNTER — ANESTHESIA EVENT (OUTPATIENT)
Dept: GASTROENTEROLOGY | Facility: AMBULARY SURGERY CENTER | Age: 54
End: 2024-03-15

## 2024-03-15 ENCOUNTER — ANESTHESIA (OUTPATIENT)
Dept: GASTROENTEROLOGY | Facility: AMBULARY SURGERY CENTER | Age: 54
End: 2024-03-15

## 2024-03-15 VITALS
OXYGEN SATURATION: 100 % | BODY MASS INDEX: 25.96 KG/M2 | SYSTOLIC BLOOD PRESSURE: 125 MMHG | WEIGHT: 141.09 LBS | DIASTOLIC BLOOD PRESSURE: 70 MMHG | HEART RATE: 78 BPM | HEIGHT: 62 IN | RESPIRATION RATE: 20 BRPM

## 2024-03-15 DIAGNOSIS — K29.70 GASTRITIS WITHOUT BLEEDING, UNSPECIFIED CHRONICITY, UNSPECIFIED GASTRITIS TYPE: Primary | ICD-10-CM

## 2024-03-15 DIAGNOSIS — R10.84 GENERALIZED ABDOMINAL PAIN: ICD-10-CM

## 2024-03-15 DIAGNOSIS — Z12.11 SCREEN FOR COLON CANCER: ICD-10-CM

## 2024-03-15 PROCEDURE — 43239 EGD BIOPSY SINGLE/MULTIPLE: CPT | Performed by: INTERNAL MEDICINE

## 2024-03-15 PROCEDURE — 88305 TISSUE EXAM BY PATHOLOGIST: CPT | Performed by: STUDENT IN AN ORGANIZED HEALTH CARE EDUCATION/TRAINING PROGRAM

## 2024-03-15 PROCEDURE — 45378 DIAGNOSTIC COLONOSCOPY: CPT | Performed by: INTERNAL MEDICINE

## 2024-03-15 RX ORDER — PROPOFOL 10 MG/ML
INJECTION, EMULSION INTRAVENOUS AS NEEDED
Status: DISCONTINUED | OUTPATIENT
Start: 2024-03-15 | End: 2024-03-15

## 2024-03-15 RX ORDER — OMEPRAZOLE 40 MG/1
40 CAPSULE, DELAYED RELEASE ORAL DAILY
Qty: 90 CAPSULE | Refills: 0 | Status: SHIPPED | OUTPATIENT
Start: 2024-03-15 | End: 2024-06-13

## 2024-03-15 RX ORDER — LIDOCAINE HYDROCHLORIDE 10 MG/ML
INJECTION, SOLUTION EPIDURAL; INFILTRATION; INTRACAUDAL; PERINEURAL AS NEEDED
Status: DISCONTINUED | OUTPATIENT
Start: 2024-03-15 | End: 2024-03-15

## 2024-03-15 RX ORDER — SODIUM CHLORIDE, SODIUM LACTATE, POTASSIUM CHLORIDE, CALCIUM CHLORIDE 600; 310; 30; 20 MG/100ML; MG/100ML; MG/100ML; MG/100ML
125 INJECTION, SOLUTION INTRAVENOUS CONTINUOUS
Status: DISCONTINUED | OUTPATIENT
Start: 2024-03-15 | End: 2024-03-19 | Stop reason: HOSPADM

## 2024-03-15 RX ORDER — PROPOFOL 10 MG/ML
INJECTION, EMULSION INTRAVENOUS CONTINUOUS PRN
Status: DISCONTINUED | OUTPATIENT
Start: 2024-03-15 | End: 2024-03-15

## 2024-03-15 RX ADMIN — PROPOFOL 50 MG: 10 INJECTION, EMULSION INTRAVENOUS at 08:21

## 2024-03-15 RX ADMIN — LIDOCAINE HYDROCHLORIDE 50 MG: 10 INJECTION, SOLUTION EPIDURAL; INFILTRATION; INTRACAUDAL; PERINEURAL at 08:16

## 2024-03-15 RX ADMIN — PROPOFOL 50 MG: 10 INJECTION, EMULSION INTRAVENOUS at 08:26

## 2024-03-15 RX ADMIN — PROPOFOL 100 MCG/KG/MIN: 10 INJECTION, EMULSION INTRAVENOUS at 08:27

## 2024-03-15 RX ADMIN — PROPOFOL 140 MG: 10 INJECTION, EMULSION INTRAVENOUS at 08:16

## 2024-03-15 RX ADMIN — SODIUM CHLORIDE, SODIUM LACTATE, POTASSIUM CHLORIDE, AND CALCIUM CHLORIDE 125 ML/HR: .6; .31; .03; .02 INJECTION, SOLUTION INTRAVENOUS at 07:20

## 2024-03-15 NOTE — ANESTHESIA PREPROCEDURE EVALUATION
Procedure:  EGD  COLONOSCOPY    Relevant Problems   CARDIO   (+) Mixed hyperlipidemia      NEURO/PSYCH   (+) Anxiety and depression   (+) Chronic pain syndrome      Former smoker  Physical Exam    Airway    Mallampati score: II  TM Distance: >3 FB  Neck ROM: full     Dental   Comment: Denies loose teeth     Cardiovascular  Cardiovascular exam normal    Pulmonary  Pulmonary exam normal     Other Findings  Portions of exam deferred due to low yield and/or unknown COVID statuspost-pubertal.      Anesthesia Plan  ASA Score- 2     Anesthesia Type- IV sedation with anesthesia with ASA Monitors.         Additional Monitors:     Airway Plan:            Plan Factors-Exercise tolerance (METS): >4 METS.    Chart reviewed.   Existing labs reviewed. Patient summary reviewed.    Patient is not a current smoker.              Induction- intravenous.    Postoperative Plan-     Informed Consent- Anesthetic plan and risks discussed with patient.  I personally reviewed this patient with the CRNA. Discussed and agreed on the Anesthesia Plan with the CRNA..

## 2024-03-15 NOTE — ANESTHESIA POSTPROCEDURE EVALUATION
Post-Op Assessment Note    CV Status:  Stable    Pain management: adequate       Mental Status:  Alert and awake   Hydration Status:  Euvolemic   PONV Controlled:  Controlled   Airway Patency:  Patent     Post Op Vitals Reviewed: Yes    No anethesia notable event occurred.    Staff: CRNA               /73 (03/15/24 0847)    Temp   98.4   Pulse 77 (03/15/24 0847)   Resp 19 (03/15/24 0847)    SpO2 100 % (03/15/24 0847)

## 2024-03-15 NOTE — H&P
History and Physical - [facility]  Ariana Hooker 53 y.o. female MRN: [id number]    HPI: Ariana Hooker is a 53 y.o. year old female who presents for colon cancer screening and for palpation of abdominal pain.      Review of Systems    Historical Information   Past Medical History:   Diagnosis Date    Allergic     Anxiety     Chronic hip pain, left     Ectopic pregnancy     GERD (gastroesophageal reflux disease)     Visual impairment      Past Surgical History:   Procedure Laterality Date    ANKLE ARTHROPLASTY      ANKLE SURGERY      AUGMENTATION MAMMAPLASTY Bilateral 2009    silicone    BREAST CYST EXCISION Right     age 20's benign    BREAST LUMPECTOMY      BREAST SURGERY Bilateral     Enlargement    EPIDURAL BLOCK INJECTION Left 03/05/2021    Procedure: left L5 transforaminal epidural steroid injection ( 15087);  Surgeon: Kalyani Ansari MD;  Location: [facility] MAIN OR;  Service: Pain Management     FL INJECTION LEFT ELBOW (NON ARTHROGRAM)  04/07/2022    LAPAROSCOPY FOR ECTOPIC PREGNANCY      And Salpingectomy    ORTHOPEDIC SURGERY  09/2018    DE ARTHROCENTESIS ASPIR&/INJ MAJOR JT/BURSA W/O US Left 05/12/2023    Procedure: Iliopsoas bursitis of left hip  (39736 [id number]);  Surgeon: Kalyani Ansari MD;  Location: [facility] MAIN OR;  Service: Pain Management     DE INJECT SI JOINT ARTHRGRPHY&/ANES/STEROID W/OKSANA Left 04/07/2022    Procedure: SACROILIAC joint injection ([id number] );  Surgeon: Kalyani Ansari MD;  Location: [facility] MAIN OR;  Service: Pain Management     STEROID INJECTION HIP Left 04/16/2021    Procedure: Sacroiliac Joint Injection (63783);  Surgeon: Kalyani Ansari MD;  Location: [facility] MAIN OR;  Service: Pain Management     TOTAL HIP ARTHROPLASTY Left 09/2018     Social History   Social History     Substance and Sexual Activity   Alcohol Use Yes    Comment: social drinker, per allscripts     Social History     Substance and Sexual Activity   Drug Use Yes    Types: Marijuana    Comment: vape  "for pain     Social History     Tobacco Use   Smoking Status Former    Types: Cigarettes   Smokeless Tobacco Never   Tobacco Comments    Former smoker, per allscripts     Family History   Problem Relation Age of Onset    Arthritis Mother     Emphysema Mother     COPD Mother     Cancer Father     Alcohol abuse Father         He has been clean for 2 years    Endometriosis Sister     No Known Problems Brother     No Known Problems Maternal Grandmother     No Known Problems Maternal Grandfather     No Known Problems Paternal Grandmother     No Known Problems Paternal Grandfather     Breast cancer Maternal Aunt 50    Cancer Maternal Aunt     No Known Problems Maternal Uncle     No Known Problems Paternal Aunt     No Known Problems Paternal Uncle        Meds/Allergies     (Not in a hospital admission)      Allergies   Allergen Reactions    Peach [Prunus Persica] Other (See Comments)     Closure of throat with peaches, apricots,plums , nectarine ,and almonds. Per pt \"also hives\"       Objective     /84   Pulse 67   Resp 18   Ht 5' 2\" (1.575 m)   Wt 64 kg (141 lb 1.5 oz)   SpO2 99%   BMI 25.81 kg/m²       PHYSICAL EXAM    Gen: NAD  CV: RRR  CHEST: Clear  ABD: soft, NT/ND  EXT: no edema  Neuro: AAO      ASSESSMENT/PLAN:  This is a 53 y.o. year old female here for evaluation of abdominal pain and colon cancer screening.    PLAN:   Procedure: EGD and colonoscopy.      "

## 2024-03-18 PROCEDURE — 88305 TISSUE EXAM BY PATHOLOGIST: CPT | Performed by: STUDENT IN AN ORGANIZED HEALTH CARE EDUCATION/TRAINING PROGRAM

## 2024-04-08 ENCOUNTER — OFFICE VISIT (OUTPATIENT)
Dept: OBGYN CLINIC | Facility: CLINIC | Age: 54
End: 2024-04-08
Payer: COMMERCIAL

## 2024-04-08 VITALS
WEIGHT: 141 LBS | BODY MASS INDEX: 25.95 KG/M2 | DIASTOLIC BLOOD PRESSURE: 70 MMHG | HEIGHT: 62 IN | SYSTOLIC BLOOD PRESSURE: 110 MMHG

## 2024-04-08 DIAGNOSIS — R10.2 PELVIC PAIN: Primary | ICD-10-CM

## 2024-04-08 DIAGNOSIS — N88.2 CERVICAL STENOSIS (UTERINE CERVIX): ICD-10-CM

## 2024-04-08 DIAGNOSIS — T85.43XD RUPTURE OF IMPLANT OF RIGHT BREAST, SUBSEQUENT ENCOUNTER: ICD-10-CM

## 2024-04-08 DIAGNOSIS — N94.10 DYSPAREUNIA IN FEMALE: ICD-10-CM

## 2024-04-08 DIAGNOSIS — R10.2 PELVIC CRAMPING: ICD-10-CM

## 2024-04-08 DIAGNOSIS — R76.8 ANA POSITIVE: ICD-10-CM

## 2024-04-08 PROCEDURE — 99203 OFFICE O/P NEW LOW 30 MIN: CPT | Performed by: STUDENT IN AN ORGANIZED HEALTH CARE EDUCATION/TRAINING PROGRAM

## 2024-04-08 RX ORDER — MISOPROSTOL 200 UG/1
TABLET ORAL
Qty: 1 TABLET | Refills: 0 | Status: SHIPPED | OUTPATIENT
Start: 2024-04-08

## 2024-04-08 RX ORDER — ONDANSETRON 4 MG/1
4 TABLET, FILM COATED ORAL EVERY 8 HOURS PRN
COMMUNITY

## 2024-04-08 NOTE — PROGRESS NOTES
Assessment/Plan:  Ariana Hooker is a 53 y.o.  with pelvic pain who presents for evaluation    No problem-specific Assessment & Plan notes found for this encounter.       Diagnoses and all orders for this visit:    Pelvic pain  -     Ambulatory Referral to Physical Therapy; Future    Pelvic cramping  -     Ambulatory referral to Obstetrics / Gynecology    THALIA positive  -     Ambulatory referral to Obstetrics / Gynecology    Rupture of implant of right breast, subsequent encounter  -     Ambulatory referral to Obstetrics / Gynecology    Dyspareunia in female  -     Ambulatory Referral to Physical Therapy; Future    Cervical stenosis (uterine cervix)  -     miSOPROStol (Cytotec) 200 mcg tablet; Crush tablet and place IN VAGINA (not mouth) night before procedure.    Other orders  -     ondansetron (ZOFRAN) 4 mg tablet; Take 4 mg by mouth every 8 (eight) hours as needed for nausea or vomiting          No pain elicited with vaginal, cervical, uterine, or adnexal evaluation today, no unusual discharge, normal mobility, no lesions  Discussed pelvic floor PT to help given core discomfort and dyspareunia  Discussed coconut oil, vitamin E supp, bonafide products, vaginal estrogen  Discussed cervical stenosis, RTO for endometrial biopsy--recommend misoprostol night before, paracervical block and attempt at dilation and evacuation of fluid seen on ultrasound    Subjective:      Patient ID: Ariana Hooker is a 53 y.o. female.    HPI  For last 4 months has had cramping below belly button, sometimes feels like is starts in vagina and shoots up to umbilicus    Had GI eval, started bentyl with minimal improvement  On gabapentin    Uses hot water  every day--makes it feels beffer  Hasn't had sex since the pain  Had been having dyspareunia, was very dry and painful  Tried using lubrication    PCP did last internal exam    Two prior pregnancies--one  (2 weeks early, daughter) and one ectopic pregnancy one tube  "burst    Hadn't been using birth control  No unusual discharge  No dysuria or hematuria    Breaking out with acne  Has tried motrin--irritated stomach  Had total hip replacement, on hydrocodone for 3 years, quit because of constipation    No hx of abnormal pap  Last pap with KESHAV Turner    The following portions of the patient's history were reviewed and updated as appropriate: allergies, current medications, past medical history, past social history, past surgical history, and problem list.    Review of Systems  --per HPI    Objective:  /70 (BP Location: Left arm, Patient Position: Sitting)   Ht 5' 2\" (1.575 m)   Wt 64 kg (141 lb)   BMI 25.79 kg/m²      Physical Exam  Constitutional:       Appearance: Normal appearance.   HENT:      Head: Normocephalic and atraumatic.      Nose: Nose normal.   Eyes:      Extraocular Movements: Extraocular movements intact.      Conjunctiva/sclera: Conjunctivae normal.   Pulmonary:      Effort: Pulmonary effort is normal.   Abdominal:      General: There is no distension.      Palpations: Abdomen is soft. There is no mass.      Comments: Some ttp below umbilicus, no rebound or guarding   Genitourinary:     Comments: Vulva: normal, no lesions  Vagina: normal hypoestrogenic pallor, no lesions or ttp, no pelvic muscle ttp or tightness  Urethra: normal, no lesions, masses or ttp  Bladder: normal, no masses or ttp  Cervix: stenotic (closed), no lesions, masses or CMT  Uterus: normal-size, normal mobility, no ttp  Adnexa: no masses or ttp  Musculoskeletal:         General: Normal range of motion.      Cervical back: Normal range of motion.   Skin:     General: Skin is warm and dry.   Neurological:      General: No focal deficit present.      Mental Status: She is alert.   Psychiatric:         Mood and Affect: Mood normal.         Behavior: Behavior normal.         Thought Content: Thought content normal.             Study Result     Narrative & Impression   PELVIC " ULTRASOUND, COMPLETE     INDICATION: The patient is 53 years old. R10.2: Pelvic and perineal pain.     COMPARISON: None     TECHNIQUE: Transabdominal pelvic ultrasound was performed in sagittal and transverse planes with a curvilinear transducer. Additional transvaginal imaging was performed to better evaluate the endometrium and ovaries. Imaging included volumetric sweeps as   well as traditional still imaging technique.     FINDINGS:     UTERUS:  The uterus is anteverted in position, measuring 6.0 x 1.9 x 3.7 cm.  The uterus has a normal contour and echotexture.  The cervix appears within normal limits.     ENDOMETRIUM:  The endometrial echo complex has an AP caliber of 2.0 mm.  Its appearance is within normal limits for age and cycle and shows no filling defects.  Trace simple free fluid within the endometrial cavity.     OVARIES/ADNEXA:  Right ovary: 1.7 x 1.5 x 0.8 cm. 1.0 mL.  Left ovary: 2.3 x 1.6 x 2.1 cm. 4.0 mL.  Ovarian Doppler flow is within normal limits.  No suspicious ovarian or adnexal abnormality.     OTHER:  No free fluid or loculated fluid collections.     IMPRESSION:     Essentially normal pelvic ultrasound.

## 2024-04-08 NOTE — PATIENT INSTRUCTIONS
Coconut oil daily   Or vitamin E vaginal suppositories 1-2x/week  Hyaluronic acid vaginal suppositories  Vaginal estrogen

## 2024-04-12 DIAGNOSIS — N88.2 CERVICAL STENOSIS (UTERINE CERVIX): ICD-10-CM

## 2024-04-16 RX ORDER — MISOPROSTOL 200 UG/1
TABLET ORAL
Qty: 1 TABLET | Refills: 0 | Status: SHIPPED | OUTPATIENT
Start: 2024-04-16

## 2024-04-30 DIAGNOSIS — R10.84 GENERALIZED ABDOMINAL PAIN: ICD-10-CM

## 2024-05-01 RX ORDER — DICYCLOMINE HYDROCHLORIDE 10 MG/1
CAPSULE ORAL
Qty: 40 CAPSULE | Refills: 5 | Status: SHIPPED | OUTPATIENT
Start: 2024-05-01

## 2024-05-13 ENCOUNTER — OFFICE VISIT (OUTPATIENT)
Dept: FAMILY MEDICINE CLINIC | Facility: CLINIC | Age: 54
End: 2024-05-13
Payer: COMMERCIAL

## 2024-05-13 VITALS
BODY MASS INDEX: 26.5 KG/M2 | SYSTOLIC BLOOD PRESSURE: 130 MMHG | DIASTOLIC BLOOD PRESSURE: 80 MMHG | TEMPERATURE: 98.2 F | HEART RATE: 92 BPM | WEIGHT: 144 LBS | RESPIRATION RATE: 14 BRPM | OXYGEN SATURATION: 97 % | HEIGHT: 62 IN

## 2024-05-13 DIAGNOSIS — F41.9 ANXIETY AND DEPRESSION: ICD-10-CM

## 2024-05-13 DIAGNOSIS — S00.461A: Primary | ICD-10-CM

## 2024-05-13 DIAGNOSIS — F32.A ANXIETY AND DEPRESSION: ICD-10-CM

## 2024-05-13 DIAGNOSIS — R10.2 PELVIC CRAMPING: ICD-10-CM

## 2024-05-13 DIAGNOSIS — L08.9: Primary | ICD-10-CM
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
## 2024-05-13 PROCEDURE — 99214 OFFICE O/P EST MOD 30 MIN: CPT | Performed by: STUDENT IN AN ORGANIZED HEALTH CARE EDUCATION/TRAINING PROGRAM

## 2024-05-13 RX ORDER — DOXYCYCLINE HYCLATE 100 MG
100 TABLET ORAL 2 TIMES DAILY
Qty: 14 TABLET | Refills: 0 | Status: SHIPPED | OUTPATIENT
Start: 2024-05-13 | End: 2024-05-20

## 2024-05-13 NOTE — PROGRESS NOTES
Clinic Visit Note  Ariana Hooker 53 y.o. female   MRN: [id number]    Assessment and Plan      Diagnoses and all orders for this visit:    Insect bite of right ear with infection  Concern for outer right ear infection after insect bite, swelling behind outer ear, no signs of mastoiditis, swelling has improved over the last 24 hours, recommend doxycycline coverage, patient also did get bit by tick on left thigh, if symptoms worsen or not improving reevaluation encouraged.  -     doxycycline hyclate (VIBRA-TABS) 100 mg tablet; Take 1 tablet (100 mg total) by mouth 2 (two) times a day for 7 days    Anxiety and depression    Pelvic cramping  Continue symptomatic management, has follow-up with OB/GYN    My impressions and treatment recommendations were discussed in detail with the patient who verbalized understanding and had no further questions.  Discharge instructions were provided.    Subjective     Chief Complaint: Acute care visit    History of Present Illness:    Patient is a pleasant 53-year-old female coming in secondary to right ear redness with swelling behind.  Concern for insect bite.    The following portions of the patient's history were reviewed and updated as appropriate: allergies, current medications, past family history, past medical history, past social history, past surgical history and problem list.    REVIEW OF SYSTEMS:  A complete 12-point review of systems is negative other than that noted in the HPI.    Review of Systems   Constitutional:  Negative for chills and fever.   HENT:  Negative for ear pain and sore throat.    Eyes:  Negative for pain and visual disturbance.   Respiratory:  Negative for cough and shortness of breath.    Cardiovascular:  Negative for chest pain and palpitations.   Gastrointestinal:  Negative for abdominal pain and vomiting.   Genitourinary:  Negative for dysuria and hematuria.   Musculoskeletal:  Negative for arthralgias and back pain.   Skin:  Positive for rash.  Negative for color change and wound.   Neurological:  Negative for seizures and syncope.   All other systems reviewed and are negative.        Current Outpatient Medications:   •  dicyclomine (BENTYL) 10 mg capsule, TAKE 1 CAPSULE BY MOUTH THREE TIMES A DAY AS NEEDED FOR ABDOMINAL CRAMPING, Disp: 40 capsule, Rfl: 5  •  doxycycline hyclate (VIBRA-TABS) 100 mg tablet, Take 1 tablet (100 mg total) by mouth 2 (two) times a day for 7 days, Disp: 14 tablet, Rfl: 0  •  gabapentin (NEURONTIN) 300 mg capsule, Take 1 capsule (300 mg total) by mouth 3 (three) times a day, Disp: 90 capsule, Rfl: 2  •  miSOPROStol (Cytotec) 200 mcg tablet, CRUSH TABLET AND PLACE IN VAGINA (NOT MOUTH) THE NIGHT BEFORE PROCEDURE, Disp: 1 tablet, Rfl: 0  •  omeprazole (PriLOSEC) 40 MG capsule, Take 1 capsule (40 mg total) by mouth daily, Disp: 90 capsule, Rfl: 0  •  ondansetron (ZOFRAN) 4 mg tablet, Take 4 mg by mouth every 8 (eight) hours as needed for nausea or vomiting, Disp: , Rfl:   Past Medical History:   Diagnosis Date   • Allergic    • Anxiety    • Chronic hip pain, left    • Ectopic pregnancy    • GERD (gastroesophageal reflux disease)    • Visual impairment      Past Surgical History:   Procedure Laterality Date   • ANKLE ARTHROPLASTY     • ANKLE SURGERY     • AUGMENTATION MAMMAPLASTY Bilateral 2009    silicone   • BREAST CYST EXCISION Right     age 20's benign   • BREAST LUMPECTOMY     • BREAST SURGERY Bilateral     Enlargement   • EPIDURAL BLOCK INJECTION Left 03/05/2021    Procedure: left L5 transforaminal epidural steroid injection ( [id number]);  Surgeon: Kalyani Ansari MD;  Location: [facility] MAIN OR;  Service: Pain Management    • FL INJECTION LEFT ELBOW (NON ARTHROGRAM)  04/07/2022   • LAPAROSCOPY FOR ECTOPIC PREGNANCY      And Salpingectomy   • ORTHOPEDIC SURGERY  09/2018   • DE ARTHROCENTESIS ASPIR&/INJ MAJOR JT/BURSA W/O  Left 05/12/2023    Procedure: Iliopsoas bursitis of left hip  ([id number]);  Surgeon: Kalyani Ansari MD;   Location: [facility] MAIN OR;  Service: Pain Management    • ID INJECT SI JOINT ARTHRGRPHY&/ANES/STEROID W/OKSANA Left 04/07/2022    Procedure: SACROILIAC joint injection ([id number] );  Surgeon: Kalyani Ansari MD;  Location: [facility] MAIN OR;  Service: Pain Management    • STEROID INJECTION HIP Left 04/16/2021    Procedure: Sacroiliac Joint Injection ([id number]);  Surgeon: Kalyani Ansari MD;  Location: [facility] MAIN OR;  Service: Pain Management    • TOTAL HIP ARTHROPLASTY Left 09/2018     Social History     Socioeconomic History   • Marital status: Single     Spouse name: Not on file   • Number of children: Not on file   • Years of education: Not on file   • Highest education level: Not on file   Occupational History   • Not on file   Tobacco Use   • Smoking status: Former     Current packs/day: 2.00     Average packs/day: 2.0 packs/day for 10.0 years (20.0 ttl pk-yrs)     Types: Cigarettes   • Smokeless tobacco: Never   • Tobacco comments:     I quit over 10 years ago   Vaping Use   • Vaping status: Some Days   • Substances: THC   Substance and Sexual Activity   • Alcohol use: Yes     Alcohol/week: 1.0 standard drink of alcohol     Types: 1 Glasses of wine per week     Comment: 1 a month on special occas,   • Drug use: Yes     Types: Marijuana     Comment: vape for pain   • Sexual activity: Not Currently     Partners: Male     Birth control/protection: Post-menopausal     Comment: Have been going through menopause since I was 41   Other Topics Concern   • Not on file   Social History Narrative    Daily caffeinated coffee consumption     Social Determinants of Health     Financial Resource Strain: Not on file   Food Insecurity: Not on file   Transportation Needs: Not on file   Physical Activity: Not on file   Stress: Not on file   Social Connections: Not on file   Intimate Partner Violence: Not on file   Housing Stability: Not on file     Family History   Problem Relation Age of Onset   • Arthritis Mother    • Emphysema Mother   "  • COPD Mother    • Asthma Mother    • Cancer Father    • Alcohol abuse Father         He has been clean for 2 years   • Breast cancer Father         Cancer of stomache lining,in remission   • Endometriosis Sister    • Cancer Sister         Endometriosis   • No Known Problems Brother    • Endometriosis Daughter    • No Known Problems Maternal Grandmother    • No Known Problems Maternal Grandfather    • No Known Problems Paternal Grandmother    • No Known Problems Paternal Grandfather    • Breast cancer Maternal Aunt 50   • Cancer Maternal Aunt    • No Known Problems Maternal Uncle    • No Known Problems Paternal Aunt    • No Known Problems Paternal Uncle      Allergies   Allergen Reactions   • Peach [Prunus Persica] Other (See Comments)     Closure of throat with peaches, apricots,plums , nectarine ,and almonds. Per pt \"also hives\"       Objective     Vitals:    05/13/24 1552   BP: 130/80   BP Location: Left arm   Patient Position: Sitting   Cuff Size: Standard   Pulse: 92   Resp: 14   Temp: 98.2 °F (36.8 °C)   TempSrc: Temporal   SpO2: 97%   Weight: 65.3 kg (144 lb)   Height: 5' 2\" (1.575 m)       Physical Exam:     GENERAL: NAD, pleasant   HEENT:  NC/AT, PERRL, EOMI, no scleral icterus  CARDIAC:  RRR, +S1/S2, no S3/S4 appreciated, no m/g/r  PULMONARY:  CTA B/L, no wheezing/rales/rhonci, non-labored breathing  ABDOMEN:  Soft, NT/ND, no rebound/guarding/rigidity  Extremities:. No edema, cyanosis, or clubbing  Musculoskeletal:  Full range of motion intact in all extremities   NEUROLOGIC: Grossly intact, no focal deficits  SKIN:  No rashes or erythema noted on exposed skin  Psych: Normal affect, mood stable    ==  PLEASE NOTE:  This encounter was completed utilizing the [employer] Direct Speech Voice Recognition Software. Grammatical errors, random word insertions, pronoun errors and incomplete sentences are occasional consequences of the system due to software limitations, ambient noise and hardware " issues.These may be missed by proof reading prior to affixing electronic signature. Any questions or concerns about the content, text or information contained within the body of this dictation should be directly addressed to the physician for clarification. Please do not hesitate to call me directly if you have any any questions or concerns.    Kishan Castro,   [facility] Internal Medicine   [facility]

## 2024-06-06 DIAGNOSIS — K29.70 GASTRITIS WITHOUT BLEEDING, UNSPECIFIED CHRONICITY, UNSPECIFIED GASTRITIS TYPE: ICD-10-CM

## 2024-06-06 RX ORDER — OMEPRAZOLE 40 MG/1
40 CAPSULE, DELAYED RELEASE ORAL DAILY
Qty: 90 CAPSULE | Refills: 1 | Status: SHIPPED | OUTPATIENT
Start: 2024-06-06

## 2024-06-07 DIAGNOSIS — J01.00 ACUTE NON-RECURRENT MAXILLARY SINUSITIS: Primary | ICD-10-CM

## 2024-06-08 RX ORDER — ONDANSETRON 4 MG/1
4 TABLET, FILM COATED ORAL EVERY 8 HOURS PRN
Qty: 20 TABLET | Refills: 0 | Status: SHIPPED | OUTPATIENT
Start: 2024-06-08

## 2024-06-20 ENCOUNTER — OFFICE VISIT (OUTPATIENT)
Age: 54
End: 2024-06-20
Payer: COMMERCIAL

## 2024-06-20 ENCOUNTER — APPOINTMENT (OUTPATIENT)
Dept: RADIOLOGY | Facility: CLINIC | Age: 54
End: 2024-06-20
Payer: COMMERCIAL

## 2024-06-20 VITALS
SYSTOLIC BLOOD PRESSURE: 122 MMHG | HEIGHT: 62 IN | BODY MASS INDEX: 26.5 KG/M2 | HEART RATE: 88 BPM | WEIGHT: 144 LBS | DIASTOLIC BLOOD PRESSURE: 77 MMHG

## 2024-06-20 DIAGNOSIS — M25.579 ANKLE PAIN, UNSPECIFIED CHRONICITY, UNSPECIFIED LATERALITY: Primary | ICD-10-CM

## 2024-06-20 DIAGNOSIS — T84.84XA PAINFUL ORTHOPAEDIC HARDWARE (HCC): ICD-10-CM

## 2024-06-20 DIAGNOSIS — M25.579 ANKLE PAIN, UNSPECIFIED CHRONICITY, UNSPECIFIED LATERALITY: ICD-10-CM

## 2024-06-20 PROCEDURE — 99203 OFFICE O/P NEW LOW 30 MIN: CPT | Performed by: STUDENT IN AN ORGANIZED HEALTH CARE EDUCATION/TRAINING PROGRAM

## 2024-06-20 PROCEDURE — 73610 X-RAY EXAM OF ANKLE: CPT

## 2024-06-20 RX ORDER — MELOXICAM 7.5 MG/1
7.5 TABLET ORAL DAILY
COMMUNITY

## 2024-06-20 NOTE — PATIENT INSTRUCTIONS
Purchase a supportive pair of sneakers such as [employer], [employer], [employer], [employer] Balance.  Some qualities to look for is that the shoe should bend only where the toes bend, the sole should not be too flimsy, it should have a wide toe box and a somewhat stiff heel support. Purchase a supportive over-the-counter pair of inserts such as Superfeet, powersteps, tread labs.    [employer]  [address] [address]  [phone]     [facility]  [address], [place] [address]  [phone]     [employer]  [address] [place] [address] [phone]     [employer]   [address]  [phone]     [employer]  [address], [place] [address]  [phone]     [employer]  [address] [phone]     [facility] [employer] [facility]   [address] [place] [address]  [phone]     [employer]  [address], [place]  [phone]     [employer] [address], [id number] [phone]     [employer] [address], [address], [place] [address] [phone]
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
## 2024-07-12 ENCOUNTER — OFFICE VISIT (OUTPATIENT)
Dept: OBGYN CLINIC | Facility: CLINIC | Age: 54
End: 2024-07-12
Payer: COMMERCIAL

## 2024-07-12 VITALS
SYSTOLIC BLOOD PRESSURE: 120 MMHG | DIASTOLIC BLOOD PRESSURE: 82 MMHG | BODY MASS INDEX: 25.4 KG/M2 | WEIGHT: 138 LBS | HEIGHT: 62 IN

## 2024-07-12 DIAGNOSIS — R10.2 PELVIC PAIN: ICD-10-CM

## 2024-07-12 DIAGNOSIS — N85.9 FLUID IN ENDOMETRIAL CAVITY: Primary | ICD-10-CM

## 2024-07-12 DIAGNOSIS — N88.2 CERVICAL STENOSIS (UTERINE CERVIX): ICD-10-CM

## 2024-07-12 PROCEDURE — 58100 BIOPSY OF UTERUS LINING: CPT | Performed by: STUDENT IN AN ORGANIZED HEALTH CARE EDUCATION/TRAINING PROGRAM

## 2024-07-12 NOTE — PROGRESS NOTES
"Pelvic pain, no postmenopausal bleeding, trace fluid in endometrial cavity on US  Cervical stenosis  Took misoprostol pre procedure  Plan for cervical block today with endometrial biopsy      Endometrial biopsy    Date/Time: 7/12/2024 2:30 PM    Performed by: Bridgette Billingsley MD  Authorized by: Bridgette Billingsley MD  Universal Protocol:  Procedure performed by:  Consent: Verbal consent obtained.  Risks and benefits: risks, benefits and alternatives were discussed  Consent given by: patient  Time out: Immediately prior to procedure a \"time out\" was called to verify the correct patient, procedure, equipment, support staff and site/side marked as required.  Patient understanding: patient states understanding of the procedure being performed  Relevant documents: relevant documents present and verified  Test results: test results available and properly labeled  Radiology Images displayed and confirmed. If images not available, report reviewed: imaging studies available  Patient identity confirmed: verbally with patient and provided demographic data    Indication:     Indications comment:  Endometrial fluid  Procedure:     Procedure: endometrial biopsy with [place]      A bivalve speculum was placed in the vagina: yes      Cervix cleaned and prepped: yes      An intracervical block was performed: yes      Local anesthetic:  Lidocaine without epinephrine    The cervix was dilated: yes      Uterus sounded: yes      Uterus sound depth (cm):  6    Curettes used:  1    Specimen collected: specimen collected and sent to pathology      Patient tolerated procedure well with no complications: yes    Findings:     Uterus size:  Non-gravid    Cervix: stenotic    Comments:     Procedure comments:  Discussed if able to assess endometrial or fluid cells and no abnormalities, no further workup indicated unless any new symptoms.  If sample without identifiable cells, will consider repeat pelvic US versus D&C.  If repeat US with " persistent endometrial fluid or abnormal cells may recommend D&C.  All questions answered to the best of my ability.

## 2024-07-18 LAB
PATH REPORT.COMMENTS IMP SPEC: NORMAL
PATH REPORT.SITE OF ORIGIN SPEC: NORMAL
PAYMENT PROCEDURE: NORMAL
SL AMB .: NORMAL

## 2024-07-31 DIAGNOSIS — R10.84 GENERALIZED ABDOMINAL PAIN: ICD-10-CM

## 2024-07-31 RX ORDER — DICYCLOMINE HYDROCHLORIDE 10 MG/1
CAPSULE ORAL
Qty: 40 CAPSULE | Refills: 5 | Status: SHIPPED | OUTPATIENT
Start: 2024-07-31

## 2024-08-01 ENCOUNTER — OFFICE VISIT (OUTPATIENT)
Dept: FAMILY MEDICINE CLINIC | Facility: CLINIC | Age: 54
End: 2024-08-01
Payer: COMMERCIAL

## 2024-08-01 VITALS
BODY MASS INDEX: 24.01 KG/M2 | RESPIRATION RATE: 18 BRPM | SYSTOLIC BLOOD PRESSURE: 135 MMHG | WEIGHT: 130.5 LBS | HEART RATE: 94 BPM | TEMPERATURE: 97.8 F | DIASTOLIC BLOOD PRESSURE: 79 MMHG | OXYGEN SATURATION: 100 % | HEIGHT: 62 IN

## 2024-08-01 DIAGNOSIS — L23.7 POISON IVY: Primary | ICD-10-CM

## 2024-08-01 PROCEDURE — 99213 OFFICE O/P EST LOW 20 MIN: CPT | Performed by: FAMILY MEDICINE

## 2024-08-01 RX ORDER — METHYLPREDNISOLONE 4 MG/1
TABLET ORAL
Qty: 21 EACH | Refills: 0 | Status: SHIPPED | OUTPATIENT
Start: 2024-08-01

## 2024-08-01 RX ORDER — TRIAMCINOLONE ACETONIDE 1 MG/G
CREAM TOPICAL 2 TIMES DAILY
Qty: 80 G | Refills: 0 | Status: SHIPPED | OUTPATIENT
Start: 2024-08-01

## 2024-09-11 NOTE — H&P (VIEW-ONLY)
Ambulatory Visit  Name: Ariana Hooker      : 1970      MRN: [id number]  Encounter Provider: Bridgette Billingsley MD  Encounter Date: 2024   Encounter department: [facility] [place]    Assessment & Plan  Pelvic cramping         Preoperative exam for gynecologic surgery         Endometrial polyp         Fluid in endometrial cavity         Cervical stenosis (uterine cervix)    Orders:    miSOPROStol (Cytotec) 200 mcg tablet; CRUSH TABLET AND PLACE IN VAGINA (NOT MOUTH) THE NIGHT BEFORE PROCEDURE      Extensively reviewed risks and benefits of hysteroscopic polypectomy, dilation and curettage.    Reviewed procedure in detail and risks including but not limited to VTE/stroke/cardiac arrest, bleeding that may require transfusion, infection, injury to surrounding structures including blood vessels, nerves, bowel or bladder that may require additional procedures or surgeries. Reviewed possibility of diagnostic laparoscopy or open incision if any unanticipated difficulty or complications.    Surgical consent reviewed and signed today.  No PATs needed  Provided surgical booklet and wash.  Reviewed need to fast after midnight (or at least 8 hours) prior to surgery.  Reviewed need for ride after surgery given under anesthesia for surgery.  All questions answered to the best of my ability.    History of Present Illness     Ariana Hooker is a 53 y.o. female who presents for surgical consultation    Still having pelvic cramping, take motrin PRN, which helps  No vaginal bleeding   No discharge        Review of Systems   HENT:  Negative for trouble swallowing.    Respiratory:  Negative for shortness of breath.    Cardiovascular:  Negative for chest pain.   Gastrointestinal:  Negative for abdominal pain.   Genitourinary:  Positive for pelvic pain. Negative for vaginal bleeding and vaginal discharge.   Musculoskeletal:  Negative for gait problem.          Diagnosis:   Endometrium (biopsy):   -  "Fragments of weakly proliferative endometrium and endometrial polyp.   - Crystallin foreign material with acute and chronic inflammatory response   identified.       Objective     /80 (BP Location: Left arm, Patient Position: Sitting)   Ht 5' 2\" (1.575 m)   Wt 59 kg (130 lb)   BMI 23.78 kg/m²     Physical Exam  Constitutional:       Appearance: Normal appearance.   HENT:      Head: Normocephalic.   Eyes:      Extraocular Movements: Extraocular movements intact.      Conjunctiva/sclera: Conjunctivae normal.   Cardiovascular:      Rate and Rhythm: Normal rate and regular rhythm.      Heart sounds: Normal heart sounds.   Pulmonary:      Effort: Pulmonary effort is normal.      Breath sounds: Normal breath sounds.   Abdominal:      Palpations: Abdomen is soft.   Musculoskeletal:         General: Normal range of motion.   Skin:     General: Skin is warm and dry.   Neurological:      General: No focal deficit present.      Mental Status: She is alert.   Psychiatric:         Mood and Affect: Mood normal.         Behavior: Behavior normal.         Thought Content: Thought content normal.         "

## 2024-09-11 NOTE — PROGRESS NOTES
Ambulatory Visit  Name: Ariana Hooker      : 1970      MRN: [id number]  Encounter Provider: Bridgette Billingsley MD  Encounter Date: 2024   Encounter department: [facility] [place]    Assessment & Plan  Pelvic cramping         Preoperative exam for gynecologic surgery         Endometrial polyp         Fluid in endometrial cavity         Cervical stenosis (uterine cervix)    Orders:    miSOPROStol (Cytotec) 200 mcg tablet; CRUSH TABLET AND PLACE IN VAGINA (NOT MOUTH) THE NIGHT BEFORE PROCEDURE      Extensively reviewed risks and benefits of hysteroscopic polypectomy, dilation and curettage.    Reviewed procedure in detail and risks including but not limited to VTE/stroke/cardiac arrest, bleeding that may require transfusion, infection, injury to surrounding structures including blood vessels, nerves, bowel or bladder that may require additional procedures or surgeries. Reviewed possibility of diagnostic laparoscopy or open incision if any unanticipated difficulty or complications.    Surgical consent reviewed and signed today.  No PATs needed  Provided surgical booklet and wash.  Reviewed need to fast after midnight (or at least 8 hours) prior to surgery.  Reviewed need for ride after surgery given under anesthesia for surgery.  All questions answered to the best of my ability.    History of Present Illness     Ariana Hooker is a 53 y.o. female who presents for surgical consultation    Still having pelvic cramping, take motrin PRN, which helps  No vaginal bleeding   No discharge        Review of Systems   HENT:  Negative for trouble swallowing.    Respiratory:  Negative for shortness of breath.    Cardiovascular:  Negative for chest pain.   Gastrointestinal:  Negative for abdominal pain.   Genitourinary:  Positive for pelvic pain. Negative for vaginal bleeding and vaginal discharge.   Musculoskeletal:  Negative for gait problem.          Diagnosis:   Endometrium (biopsy):   -  "Fragments of weakly proliferative endometrium and endometrial polyp.   - Crystallin foreign material with acute and chronic inflammatory response   identified.       Objective     /80 (BP Location: Left arm, Patient Position: Sitting)   Ht 5' 2\" (1.575 m)   Wt 59 kg (130 lb)   BMI 23.78 kg/m²     Physical Exam  Constitutional:       Appearance: Normal appearance.   HENT:      Head: Normocephalic.   Eyes:      Extraocular Movements: Extraocular movements intact.      Conjunctiva/sclera: Conjunctivae normal.   Cardiovascular:      Rate and Rhythm: Normal rate and regular rhythm.      Heart sounds: Normal heart sounds.   Pulmonary:      Effort: Pulmonary effort is normal.      Breath sounds: Normal breath sounds.   Abdominal:      Palpations: Abdomen is soft.   Musculoskeletal:         General: Normal range of motion.   Skin:     General: Skin is warm and dry.   Neurological:      General: No focal deficit present.      Mental Status: She is alert.   Psychiatric:         Mood and Affect: Mood normal.         Behavior: Behavior normal.         Thought Content: Thought content normal.         "

## 2024-09-12 ENCOUNTER — OFFICE VISIT (OUTPATIENT)
Dept: OBGYN CLINIC | Facility: CLINIC | Age: 54
End: 2024-09-12
Payer: COMMERCIAL

## 2024-09-12 VITALS
WEIGHT: 130 LBS | SYSTOLIC BLOOD PRESSURE: 112 MMHG | HEIGHT: 62 IN | DIASTOLIC BLOOD PRESSURE: 80 MMHG | BODY MASS INDEX: 23.92 KG/M2

## 2024-09-12 DIAGNOSIS — R10.2 PELVIC CRAMPING: Primary | ICD-10-CM

## 2024-09-12 DIAGNOSIS — Z01.818 PREOPERATIVE EXAM FOR GYNECOLOGIC SURGERY: ICD-10-CM

## 2024-09-12 DIAGNOSIS — N88.2 CERVICAL STENOSIS (UTERINE CERVIX): ICD-10-CM

## 2024-09-12 DIAGNOSIS — N85.9 FLUID IN ENDOMETRIAL CAVITY: ICD-10-CM

## 2024-09-12 DIAGNOSIS — N84.0 ENDOMETRIAL POLYP: ICD-10-CM

## 2024-09-12 PROCEDURE — 99214 OFFICE O/P EST MOD 30 MIN: CPT | Performed by: STUDENT IN AN ORGANIZED HEALTH CARE EDUCATION/TRAINING PROGRAM

## 2024-09-12 RX ORDER — MISOPROSTOL 200 UG/1
TABLET ORAL
Qty: 1 TABLET | Refills: 0 | Status: SHIPPED | OUTPATIENT
Start: 2024-09-12

## 2024-09-13 DIAGNOSIS — R10.84 GENERALIZED ABDOMINAL PAIN: ICD-10-CM

## 2024-09-13 RX ORDER — DICYCLOMINE HYDROCHLORIDE 10 MG/1
CAPSULE ORAL
Qty: 270 CAPSULE | Refills: 1 | Status: SHIPPED | OUTPATIENT
Start: 2024-09-13

## 2024-09-25 ENCOUNTER — TELEPHONE (OUTPATIENT)
Dept: OBGYN CLINIC | Facility: CLINIC | Age: 54
End: 2024-09-25

## 2024-09-25 NOTE — TELEPHONE ENCOUNTER
----- Message from Bridgette Billingsley MD sent at 2024 10:51 AM EDT -----  [facility] GYN Department  Surgery Scheduling Sheet    Patient Name: Ariana Hooker  : 1970    Provider: Bridgette Billingsley MD     Needed: no; Language: N/A    Procedure: exam under anesthesia and operative hysteroscopy, hysteroscopic polypectomy    Diagnosis: pelvic cramping, endometrial polyp, cervical stenosis (sent misoprostol for night before)    Special Needs or Equipment: symphion    Anesthesia: choice    Length of stay: outpatient  Does patient have comorbid conditions that will require close perioperative monitoring prior to safe discharge: no    The patient has comorbid conditions that will require close perioperative monitoring prior to safe discharge, including N/A.   This may require acute care beyond the usual and routine recovery period. As such, inpatient admission post-operatively is expected and appropriate, and anticipated hospital length of stay will be >2 midnights.    Pre-Admission Testing Needed: no   Labs that should be ordered: NA    Order PAT that is recommended in prep for procedure?: No    Medical Clearance Needed: no; Provider: N/A    MA Form Signed (tubals/hysterectomy): Not Indicated    Surgical Drink Given: no     How many days out of work: 2 day(s)     How many days no drivin day(s)       Is pre op appt needed?  no  Interval for post op appt: 2 week(s)     For Surgical Scheduler:     Surgery Scheduled On:  [place]: [facility]     Pre-op Appt: 24   Post op Appt:  Consult/Medical clearance appt:

## 2024-09-25 NOTE — PRE-PROCEDURE INSTRUCTIONS
Pre-Surgery Instructions:   Medication Instructions    dicyclomine (BENTYL) 10 mg capsule Uses PRN- DO NOT take day of surgery    meloxicam (MOBIC) 7.5 mg tablet Stop taking 7 days prior to surgery.    miSOPROStol (Cytotec) 200 mcg tablet Instructions provided by MD    ondansetron (ZOFRAN) 4 mg tablet Uses PRN- OK to take day of surgery   Medication instructions for day surgery reviewed. Please use only a sip of water to take your instructed medications. Avoid all over the counter vitamins, supplements and NSAIDS for one week prior to surgery per anesthesia guidelines. Tylenol is ok to take as needed.     You will receive a call one business day prior to surgery with an arrival time and hospital directions. If your surgery is scheduled on a Monday, the hospital will be calling you on the Friday prior to your surgery. If you have not heard from anyone by 8pm, please call the hospital supervisor through the hospital  at [phone]. ([place] [phone] or [place] [phone]).    Do not eat or drink anything after midnight the night before your surgery, including candy, mints, lifesavers, or chewing gum. Do not drink alcohol 24hrs before your surgery. Try not to smoke at least 24hrs before your surgery.       Follow the pre surgery showering instructions as listed in the “My Surgical Experience Booklet” or otherwise provided by your surgeon's office. Do not use a blade to shave  the surgical area 1 week before surgery. It is okay to use a clean electric clippers up to 24 hours before surgery. Do not apply any lotions, creams, including makeup, cologne, deodorant, or perfumes after showering on the day of your surgery. Do not use dry shampoo, hair spray, hair gel, or any type of hair products.     No contact lenses, eye make-up, or artificial eyelashes. Remove nail polish, including gel polish, and any artificial, gel, or acrylic nails if possible. Remove all jewelry including rings and body piercing jewelry.     Wear causal clothing that is easy to take on and off. Consider your type of surgery.    Keep any valuables, jewelry, piercings at home. Please bring any specially ordered equipment (sling, braces) if indicated.    Arrange for a responsible person to drive you to and from the hospital on the day of your surgery. Please confirm the visitor policy for the day of your procedure when you receive your phone call with an arrival time.     Call the surgeon's office with any new illnesses, exposures, or additional questions prior to surgery.    Please reference your “My Surgical Experience Booklet” for additional information to prepare for your upcoming surgery.    Pt. Verbalized an understanding of all instructions reviewed and offers no concerns at this time.

## 2024-10-02 ENCOUNTER — ANESTHESIA EVENT (OUTPATIENT)
Dept: PERIOP | Facility: HOSPITAL | Age: 54
End: 2024-10-02
Payer: COMMERCIAL

## 2024-10-02 ENCOUNTER — HOSPITAL ENCOUNTER (OUTPATIENT)
Facility: HOSPITAL | Age: 54
Setting detail: OUTPATIENT SURGERY
Discharge: HOME | End: 2024-10-02
Attending: STUDENT IN AN ORGANIZED HEALTH CARE EDUCATION/TRAINING PROGRAM | Admitting: STUDENT IN AN ORGANIZED HEALTH CARE EDUCATION/TRAINING PROGRAM
Payer: COMMERCIAL

## 2024-10-02 ENCOUNTER — ANESTHESIA (OUTPATIENT)
Dept: PERIOP | Facility: HOSPITAL | Age: 54
End: 2024-10-02
Payer: COMMERCIAL

## 2024-10-02 VITALS
BODY MASS INDEX: 23.92 KG/M2 | TEMPERATURE: 98 F | WEIGHT: 130 LBS | DIASTOLIC BLOOD PRESSURE: 70 MMHG | HEART RATE: 67 BPM | OXYGEN SATURATION: 100 % | RESPIRATION RATE: 16 BRPM | HEIGHT: 62 IN | SYSTOLIC BLOOD PRESSURE: 134 MMHG

## 2024-10-02 DIAGNOSIS — N84.0 ENDOMETRIAL POLYP: ICD-10-CM

## 2024-10-02 DIAGNOSIS — Z98.890 STATUS POST HYSTEROSCOPY: Primary | ICD-10-CM

## 2024-10-02 DIAGNOSIS — R10.2 PELVIC CRAMPING: ICD-10-CM

## 2024-10-02 LAB
EXT PREGNANCY TEST URINE: NEGATIVE
EXT. CONTROL: NORMAL

## 2024-10-02 PROCEDURE — 58120 DILATION AND CURETTAGE: CPT | Performed by: STUDENT IN AN ORGANIZED HEALTH CARE EDUCATION/TRAINING PROGRAM

## 2024-10-02 PROCEDURE — 81025 URINE PREGNANCY TEST: CPT | Performed by: STUDENT IN AN ORGANIZED HEALTH CARE EDUCATION/TRAINING PROGRAM

## 2024-10-02 PROCEDURE — 88305 TISSUE EXAM BY PATHOLOGIST: CPT | Performed by: PATHOLOGY

## 2024-10-02 RX ORDER — HYDROMORPHONE HCL/PF 1 MG/ML
0.5 SYRINGE (ML) INJECTION
Status: DISCONTINUED | OUTPATIENT
Start: 2024-10-02 | End: 2024-10-02 | Stop reason: HOSPADM

## 2024-10-02 RX ORDER — KETOROLAC TROMETHAMINE 30 MG/ML
INJECTION, SOLUTION INTRAMUSCULAR; INTRAVENOUS AS NEEDED
Status: DISCONTINUED | OUTPATIENT
Start: 2024-10-02 | End: 2024-10-02

## 2024-10-02 RX ORDER — PROPOFOL 10 MG/ML
INJECTION, EMULSION INTRAVENOUS AS NEEDED
Status: DISCONTINUED | OUTPATIENT
Start: 2024-10-02 | End: 2024-10-02

## 2024-10-02 RX ORDER — EPHEDRINE SULFATE 50 MG/ML
INJECTION INTRAVENOUS AS NEEDED
Status: DISCONTINUED | OUTPATIENT
Start: 2024-10-02 | End: 2024-10-02

## 2024-10-02 RX ORDER — ONDANSETRON 2 MG/ML
INJECTION INTRAMUSCULAR; INTRAVENOUS AS NEEDED
Status: DISCONTINUED | OUTPATIENT
Start: 2024-10-02 | End: 2024-10-02

## 2024-10-02 RX ORDER — MAGNESIUM HYDROXIDE 1200 MG/15ML
LIQUID ORAL AS NEEDED
Status: DISCONTINUED | OUTPATIENT
Start: 2024-10-02 | End: 2024-10-02 | Stop reason: HOSPADM

## 2024-10-02 RX ORDER — FENTANYL CITRATE/PF 50 MCG/ML
50 SYRINGE (ML) INJECTION
Status: DISCONTINUED | OUTPATIENT
Start: 2024-10-02 | End: 2024-10-02 | Stop reason: HOSPADM

## 2024-10-02 RX ORDER — SODIUM CHLORIDE, SODIUM LACTATE, POTASSIUM CHLORIDE, CALCIUM CHLORIDE 600; 310; 30; 20 MG/100ML; MG/100ML; MG/100ML; MG/100ML
20 INJECTION, SOLUTION INTRAVENOUS CONTINUOUS
Status: CANCELLED | OUTPATIENT
Start: 2024-10-02

## 2024-10-02 RX ORDER — OXYCODONE AND ACETAMINOPHEN 5; 325 MG/1; MG/1
1 TABLET ORAL ONCE AS NEEDED
Status: DISCONTINUED | OUTPATIENT
Start: 2024-10-02 | End: 2024-10-02 | Stop reason: HOSPADM

## 2024-10-02 RX ORDER — DEXAMETHASONE SODIUM PHOSPHATE 10 MG/ML
INJECTION, SOLUTION INTRAMUSCULAR; INTRAVENOUS AS NEEDED
Status: DISCONTINUED | OUTPATIENT
Start: 2024-10-02 | End: 2024-10-02

## 2024-10-02 RX ORDER — MIDAZOLAM HYDROCHLORIDE 2 MG/2ML
INJECTION, SOLUTION INTRAMUSCULAR; INTRAVENOUS AS NEEDED
Status: DISCONTINUED | OUTPATIENT
Start: 2024-10-02 | End: 2024-10-02

## 2024-10-02 RX ORDER — SODIUM CHLORIDE, SODIUM LACTATE, POTASSIUM CHLORIDE, CALCIUM CHLORIDE 600; 310; 30; 20 MG/100ML; MG/100ML; MG/100ML; MG/100ML
INJECTION, SOLUTION INTRAVENOUS CONTINUOUS PRN
Status: DISCONTINUED | OUTPATIENT
Start: 2024-10-02 | End: 2024-10-02

## 2024-10-02 RX ORDER — DIPHENHYDRAMINE HYDROCHLORIDE 50 MG/ML
12.5 INJECTION INTRAMUSCULAR; INTRAVENOUS ONCE AS NEEDED
Status: DISCONTINUED | OUTPATIENT
Start: 2024-10-02 | End: 2024-10-02 | Stop reason: HOSPADM

## 2024-10-02 RX ORDER — LIDOCAINE HYDROCHLORIDE 10 MG/ML
INJECTION, SOLUTION EPIDURAL; INFILTRATION; INTRACAUDAL; PERINEURAL AS NEEDED
Status: DISCONTINUED | OUTPATIENT
Start: 2024-10-02 | End: 2024-10-02

## 2024-10-02 RX ORDER — FENTANYL CITRATE 50 UG/ML
INJECTION, SOLUTION INTRAMUSCULAR; INTRAVENOUS AS NEEDED
Status: DISCONTINUED | OUTPATIENT
Start: 2024-10-02 | End: 2024-10-02

## 2024-10-02 RX ORDER — ONDANSETRON 2 MG/ML
4 INJECTION INTRAMUSCULAR; INTRAVENOUS ONCE AS NEEDED
Status: DISCONTINUED | OUTPATIENT
Start: 2024-10-02 | End: 2024-10-02 | Stop reason: HOSPADM

## 2024-10-02 RX ORDER — IBUPROFEN 600 MG/1
600 TABLET, FILM COATED ORAL EVERY 6 HOURS PRN
Start: 2024-10-02

## 2024-10-02 RX ORDER — ACETAMINOPHEN 500 MG
1000 TABLET ORAL EVERY 6 HOURS PRN
Start: 2024-10-02

## 2024-10-02 RX ADMIN — FENTANYL CITRATE 50 MCG: 50 INJECTION, SOLUTION INTRAMUSCULAR; INTRAVENOUS at 15:59

## 2024-10-02 RX ADMIN — SODIUM CHLORIDE 8 MCG: 9 INJECTION, SOLUTION INTRAVENOUS at 16:28

## 2024-10-02 RX ADMIN — EPHEDRINE SULFATE 10 MG: 50 INJECTION, SOLUTION INTRAVENOUS at 16:07

## 2024-10-02 RX ADMIN — SODIUM CHLORIDE, SODIUM LACTATE, POTASSIUM CHLORIDE, AND CALCIUM CHLORIDE: .6; .31; .03; .02 INJECTION, SOLUTION INTRAVENOUS at 15:40

## 2024-10-02 RX ADMIN — LIDOCAINE HYDROCHLORIDE 50 MG: 10 INJECTION, SOLUTION EPIDURAL; INFILTRATION; INTRACAUDAL; PERINEURAL at 15:53

## 2024-10-02 RX ADMIN — KETOROLAC TROMETHAMINE 15 MG: 30 INJECTION, SOLUTION INTRAMUSCULAR at 16:21

## 2024-10-02 RX ADMIN — FENTANYL CITRATE 50 MCG: 50 INJECTION, SOLUTION INTRAMUSCULAR; INTRAVENOUS at 15:56

## 2024-10-02 RX ADMIN — ONDANSETRON 4 MG: 2 INJECTION INTRAMUSCULAR; INTRAVENOUS at 16:02

## 2024-10-02 RX ADMIN — PROPOFOL 50 MG: 10 INJECTION, EMULSION INTRAVENOUS at 15:56

## 2024-10-02 RX ADMIN — MIDAZOLAM 2 MG: 1 INJECTION INTRAMUSCULAR; INTRAVENOUS at 15:44

## 2024-10-02 RX ADMIN — DEXAMETHASONE SODIUM PHOSPHATE 10 MG: 10 INJECTION, SOLUTION INTRAMUSCULAR; INTRAVENOUS at 16:02

## 2024-10-02 RX ADMIN — PROPOFOL 200 MG: 10 INJECTION, EMULSION INTRAVENOUS at 15:53

## 2024-10-02 NOTE — INTERVAL H&P NOTE
H&P reviewed. After examining the patient I find no changes in the patients condition since the H&P had been written.    Vitals:    10/02/24 1226   BP: 116/62   Pulse:    Resp:    Temp:    SpO2:

## 2024-10-02 NOTE — ANESTHESIA POSTPROCEDURE EVALUATION
Post-Op Assessment Note    CV Status:  Stable  Pain Score: 3    Pain management: adequate       Mental Status:  Alert and awake   Hydration Status:  Euvolemic   PONV Controlled:  Controlled   Airway Patency:  Patent     Post Op Vitals Reviewed: Yes    No anethesia notable event occurred.    Staff: Anesthesiologist               BP      Temp      Pulse     Resp      SpO2

## 2024-10-02 NOTE — PERIOPERATIVE NURSING NOTE
Nurse handoff was received from [facility]  PACU RN.  Patient was toileted.  Patient alert and awake; no distress noted.   Patient has no complaints at this time, aside from mild cramping.

## 2024-10-02 NOTE — ANESTHESIA PREPROCEDURE EVALUATION
Procedure:  HYSTEROSCOPY DILATION AND CURETTAGE, HYSTEROSCOPIC POLY[PECTOMY, EXAM UNDER ANESTHESIA (Uterus)    Relevant Problems   CARDIO   (+) Mixed hyperlipidemia      NEURO/PSYCH   (+) Anxiety and depression        Physical Exam    Airway    Mallampati score: II  TM Distance: >3 FB  Neck ROM: full     Dental   Comment: Denies loose teeth     Cardiovascular  Cardiovascular exam normal    Pulmonary  Pulmonary exam normal     Other Findings  Portions of exam deferred due to low yield and/or unknown COVID statuspost-pubertal.      Anesthesia Plan  ASA Score- 2     Anesthesia Type- general with ASA Monitors.         Additional Monitors:     Airway Plan: LMA.           Plan Factors-Exercise tolerance (METS): >4 METS.    Chart reviewed.   Existing labs reviewed. Patient summary reviewed.    Patient is not a current smoker.              Induction- intravenous.    Postoperative Plan-         Informed Consent- Anesthetic plan and risks discussed with patient.  I personally reviewed this patient with the CRNA. Discussed and agreed on the Anesthesia Plan with the CRNA..

## 2024-10-02 NOTE — PERIOPERATIVE NURSING NOTE
Dr. Billingsley called patient's fiance (Clay) for update.  Patient remains alert and awake; no distress noted.  Patient has no complaints at this time.  AVS and medication detail were reviewed with patient and patient's fiance.  Both verbalized understanding of the education provided; both stated that they do not have further questions at this time.  Patient discharged via wheelchair accompanied by this RN (author).

## 2024-10-07 PROCEDURE — 88305 TISSUE EXAM BY PATHOLOGIST: CPT | Performed by: PATHOLOGY

## 2024-10-25 ENCOUNTER — OFFICE VISIT (OUTPATIENT)
Dept: OBGYN CLINIC | Facility: CLINIC | Age: 54
End: 2024-10-25
Payer: COMMERCIAL

## 2024-10-25 VITALS
BODY MASS INDEX: 23.37 KG/M2 | SYSTOLIC BLOOD PRESSURE: 112 MMHG | DIASTOLIC BLOOD PRESSURE: 80 MMHG | WEIGHT: 127 LBS | HEIGHT: 62 IN

## 2024-10-25 DIAGNOSIS — M62.89 HIGH-TONE PELVIC FLOOR DYSFUNCTION IN FEMALE: ICD-10-CM

## 2024-10-25 DIAGNOSIS — N94.89 VAGINAL CRAMPING: ICD-10-CM

## 2024-10-25 DIAGNOSIS — Z98.890 STATUS POST HYSTEROSCOPY: Primary | ICD-10-CM

## 2024-10-25 LAB
SL AMB  POCT GLUCOSE, UA: NORMAL
SL AMB LEUKOCYTE ESTERASE,UA: NORMAL
SL AMB POCT BILIRUBIN,UA: NORMAL
SL AMB POCT BLOOD,UA: NORMAL
SL AMB POCT KETONES,UA: NORMAL
SL AMB POCT NITRITE,UA: NORMAL
SL AMB POCT PH,UA: 6
SL AMB POCT SPECIFIC GRAVITY,UA: 1.02
SL AMB POCT URINE PROTEIN: NORMAL
SL AMB POCT UROBILINOGEN: NORMAL

## 2024-10-25 PROCEDURE — 99024 POSTOP FOLLOW-UP VISIT: CPT | Performed by: STUDENT IN AN ORGANIZED HEALTH CARE EDUCATION/TRAINING PROGRAM

## 2024-10-25 PROCEDURE — 81002 URINALYSIS NONAUTO W/O SCOPE: CPT | Performed by: STUDENT IN AN ORGANIZED HEALTH CARE EDUCATION/TRAINING PROGRAM

## 2024-10-25 NOTE — PROGRESS NOTES
"Post-operative visit    Subjective     Ariana Hooker is a 53 y.o. female who presents to the office status post Hsc, D&C 10/02/2024 for pelvic cramping and endometrial polyp.    Pelvic cramping when has to have BM or void, resolves with void  Had one day where she felt urge to void but just very minimal emptying all day    Now voiding normally  No dysuria or hematuria    Not taking any more pills  Hasn't used hot water bottle since after procedure  Severe cramping she had before procedure has resolved    Just spotting for a week, then resolved  No discharge  No fevers    Feeling much better!      Operative findings: Stenotic cervix  Small anteverted uterus  Uterus sounded to 5cm, dilation guided by simultaneous transabdominal ultrasound  Unable to advance hysteroscope to fundus, no pictures taken    Pathology: Final Diagnosis A. Endometrium, curettings: - Fragmented weakly proliferative appearing endometrium with partial stromal collapse. - No plasma cells, hyperplasia or carcinoma identified.      The following portions of the patient's history were reviewed and updated as appropriate: allergies, current medications, past medical history, past social history, past surgical history, and problem list.    Review of Systems  Pertinent items are noted in HPI.      Objective     /80 (BP Location: Left arm, Patient Position: Sitting)   Ht 5' 2\" (1.575 m)   Wt 57.6 kg (127 lb)   BMI 23.23 kg/m²   General:alert and oriented, in no acute distress  Pulm: normal respiratory effort   Abdomen: soft, non-tender  Pelvic: Vulva: normal, no lesions  Vagina: normal, no lesions, high tone pelvic floor, R>>L, no ttp  Urethra: normal, no lesions, masses or ttp  Bladder: normal, no masses or ttp  Cervix: normal, no lesions, masses or CMT  Uterus: small-size, normal mobility  Adnexa: no masses or ttp    Extremities: full range of motion, no lower extremity edema         Assessment   Doing very well postoperatively  Operative " findings again reviewed. Pathology report discussed.      Plan  PFPT for high tone pelvic floor and cramping before voiding and Bms  RTO for annual    Bridgette Billingsley MD   10/25/24   9:59 AM

## 2024-12-03 ENCOUNTER — TELEPHONE (OUTPATIENT)
Dept: OBGYN CLINIC | Facility: CLINIC | Age: 54
End: 2024-12-03

## 2024-12-03 NOTE — TELEPHONE ENCOUNTER
Left message for pt to call back to reschedule annual appointment as provider is not available at that time.

## 2025-01-14 ENCOUNTER — TELEPHONE (OUTPATIENT)
Dept: OBGYN CLINIC | Facility: CLINIC | Age: 55
End: 2025-01-14

## (undated) DEVICE — NEEDLE SPINAL 22G X 5IN QUINCKE

## (undated) DEVICE — SMALL NEEDLE COUNTER NEST

## (undated) DEVICE — RADIOLOGY STERILE LABELS: Brand: CENTURION

## (undated) DEVICE — GLOVE PI ULTRA TOUCH SZ.6.5

## (undated) DEVICE — DRAPE,UNDERBUTTOCKS,PCH,STERILE: Brand: MEDLINE

## (undated) DEVICE — PREMIUM DRY TRAY LF: Brand: MEDLINE INDUSTRIES, INC.

## (undated) DEVICE — LIGHT GLOVE GREEN

## (undated) DEVICE — WIPES BABY PAMPERS SENSITIVE 36/PK

## (undated) DEVICE — STERILE LATEX POWDER-FREE SURGICAL GLOVESWITH NITRILE COATING: Brand: PROTEXIS

## (undated) DEVICE — TUBING SUCTION 5MM X 12 FT

## (undated) DEVICE — PERI/GYN PACK: Brand: CONVERTORS

## (undated) DEVICE — TOWEL SET X-RAY

## (undated) DEVICE — TRAY EPIDURAL PERIFIX 20GA X 3.5IN TUOHY 8ML

## (undated) DEVICE — SKIN MARKER DUAL TIP WITH RULER CAP, FLEXIBLE RULER AND LABELS: Brand: DEVON

## (undated) DEVICE — PLASTIC ADHESIVE BANDAGE: Brand: CURITY

## (undated) DEVICE — SPECIMEN CONTAINER: Brand: CARDINAL HEALTH

## (undated) DEVICE — NEEDLE BLUNT 18 G X 1 1/2 W FILTER

## (undated) DEVICE — GLOVE SRG BIOGEL 7.5

## (undated) DEVICE — SYRINGE CATH TIP 50ML

## (undated) DEVICE — NEEDLE SPINAL 25G X 3.5 IN QUINCKE

## (undated) DEVICE — SYRINGE 5ML LL

## (undated) DEVICE — LUBRICANT JELLY SURGILUBE TUBE 4OZ FLIP TOP

## (undated) DEVICE — PVC URETHRAL CATHETER: Brand: DOVER

## (undated) DEVICE — CHLORAPREP APPLICATOR TINTED 10.5ML ONE-STEP

## (undated) DEVICE — ASTOUND STANDARD SURGICAL GOWN, XL: Brand: CONVERTORS

## (undated) DEVICE — TELFA NON-ADHERENT ABSORBENT DRESSING: Brand: TELFA

## (undated) DEVICE — CHLORAPREP HI-LITE 10.5ML ORANGE

## (undated) DEVICE — CHLORHEXIDINE 4PCT 4 OZ

## (undated) DEVICE — NEEDLE SPINAL 22G X 3.5IN  QUINCKE

## (undated) DEVICE — GLOVE INDICATOR PI UNDERGLOVE SZ 7 BLUE

## (undated) DEVICE — CYSTO TUBING SINGLE IRRIGATION

## (undated) DEVICE — ENDOMETRIAL BX PIPELLE

## (undated) DEVICE — TRAY EPID CONT PERIFIX 18G X 3.5IN 5ML CLSD TIP DRAPE

## (undated) DEVICE — SPONGE 4 X 4 XRAY 16 PLY STRL LF RFD